# Patient Record
Sex: MALE | Race: WHITE | NOT HISPANIC OR LATINO | Employment: OTHER
[De-identification: names, ages, dates, MRNs, and addresses within clinical notes are randomized per-mention and may not be internally consistent; named-entity substitution may affect disease eponyms.]

---

## 2019-01-04 ENCOUNTER — RECORDS - HEALTHEAST (OUTPATIENT)
Dept: ADMINISTRATIVE | Facility: OTHER | Age: 84
End: 2019-01-04

## 2019-01-05 ENCOUNTER — HOSPITAL ENCOUNTER (OUTPATIENT)
Dept: ULTRASOUND IMAGING | Facility: HOSPITAL | Age: 84
Discharge: HOME | End: 2019-01-05
Attending: INTERNAL MEDICINE

## 2019-01-05 DIAGNOSIS — D75.1 POLYCYTHEMIA: ICD-10-CM

## 2019-08-13 ENCOUNTER — AMBULATORY - HEALTHEAST (OUTPATIENT)
Dept: CARDIOLOGY | Facility: CLINIC | Age: 84
End: 2019-08-13

## 2019-08-13 ENCOUNTER — HOME CARE/HOSPICE - HEALTHEAST (OUTPATIENT)
Dept: HOME HEALTH SERVICES | Facility: HOME HEALTH | Age: 84
End: 2019-08-13

## 2019-08-19 ENCOUNTER — OFFICE VISIT - HEALTHEAST (OUTPATIENT)
Dept: GERIATRICS | Facility: CLINIC | Age: 84
End: 2019-08-19

## 2019-08-19 DIAGNOSIS — I48.0 PAROXYSMAL ATRIAL FIBRILLATION (H): ICD-10-CM

## 2019-08-19 DIAGNOSIS — Z95.810 BIVENTRICULAR ICD (IMPLANTABLE CARDIOVERTER-DEFIBRILLATOR) IN PLACE: ICD-10-CM

## 2019-08-19 DIAGNOSIS — J44.9 CHRONIC OBSTRUCTIVE PULMONARY DISEASE, UNSPECIFIED COPD TYPE (H): ICD-10-CM

## 2019-08-19 DIAGNOSIS — I50.9 CONGESTIVE HEART FAILURE, UNSPECIFIED HF CHRONICITY, UNSPECIFIED HEART FAILURE TYPE (H): ICD-10-CM

## 2019-08-19 DIAGNOSIS — R55 PRE-SYNCOPE: ICD-10-CM

## 2019-08-26 ENCOUNTER — OFFICE VISIT - HEALTHEAST (OUTPATIENT)
Dept: GERIATRICS | Facility: CLINIC | Age: 84
End: 2019-08-26

## 2019-08-26 DIAGNOSIS — I48.0 PAROXYSMAL ATRIAL FIBRILLATION (H): ICD-10-CM

## 2019-08-26 DIAGNOSIS — R42 DIZZINESS: ICD-10-CM

## 2019-08-26 DIAGNOSIS — I50.9 CONGESTIVE HEART FAILURE, UNSPECIFIED HF CHRONICITY, UNSPECIFIED HEART FAILURE TYPE (H): ICD-10-CM

## 2019-08-26 DIAGNOSIS — J44.9 CHRONIC OBSTRUCTIVE PULMONARY DISEASE, UNSPECIFIED COPD TYPE (H): ICD-10-CM

## 2019-08-28 ENCOUNTER — COMMUNICATION - HEALTHEAST (OUTPATIENT)
Dept: GERIATRICS | Facility: CLINIC | Age: 84
End: 2019-08-28

## 2019-08-29 ENCOUNTER — OFFICE VISIT - HEALTHEAST (OUTPATIENT)
Dept: GERIATRICS | Facility: CLINIC | Age: 84
End: 2019-08-29

## 2019-08-29 DIAGNOSIS — I50.9 CONGESTIVE HEART FAILURE, UNSPECIFIED HF CHRONICITY, UNSPECIFIED HEART FAILURE TYPE (H): ICD-10-CM

## 2019-08-29 DIAGNOSIS — J44.9 CHRONIC OBSTRUCTIVE PULMONARY DISEASE, UNSPECIFIED COPD TYPE (H): ICD-10-CM

## 2019-08-29 DIAGNOSIS — J96.11 CHRONIC RESPIRATORY FAILURE WITH HYPOXIA (H): ICD-10-CM

## 2019-08-29 DIAGNOSIS — I44.2 CHB (COMPLETE HEART BLOCK) (H): ICD-10-CM

## 2019-08-29 DIAGNOSIS — Z95.810 BIVENTRICULAR ICD (IMPLANTABLE CARDIOVERTER-DEFIBRILLATOR) IN PLACE: ICD-10-CM

## 2019-08-30 ENCOUNTER — RECORDS - HEALTHEAST (OUTPATIENT)
Dept: LAB | Facility: CLINIC | Age: 84
End: 2019-08-30

## 2019-09-02 ENCOUNTER — OFFICE VISIT - HEALTHEAST (OUTPATIENT)
Dept: GERIATRICS | Facility: CLINIC | Age: 84
End: 2019-09-02
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
## 2019-09-03 ENCOUNTER — OFFICE VISIT - HEALTHEAST (OUTPATIENT)
Dept: GERIATRICS | Facility: CLINIC | Age: 84
End: 2019-09-03

## 2019-09-03 ENCOUNTER — COMMUNICATION - HEALTHEAST (OUTPATIENT)
Dept: GERIATRICS | Facility: CLINIC | Age: 84
End: 2019-09-03

## 2019-09-03 DIAGNOSIS — J44.9 CHRONIC OBSTRUCTIVE PULMONARY DISEASE, UNSPECIFIED COPD TYPE (H): ICD-10-CM

## 2019-09-03 DIAGNOSIS — R55 PRE-SYNCOPE: ICD-10-CM

## 2019-09-03 DIAGNOSIS — I42.8 NICM (NONISCHEMIC CARDIOMYOPATHY) (H): ICD-10-CM

## 2019-09-03 LAB
ANION GAP SERPL CALCULATED.3IONS-SCNC: 7 MMOL/L (ref 5–18)
BUN SERPL-MCNC: 27 MG/DL (ref 8–28)
CALCIUM SERPL-MCNC: 9 MG/DL (ref 8.5–10.5)
CHLORIDE BLD-SCNC: 98 MMOL/L (ref 98–107)
CO2 SERPL-SCNC: 32 MMOL/L (ref 22–31)
CREAT SERPL-MCNC: 0.9 MG/DL (ref 0.7–1.3)
GFR SERPL CREATININE-BSD FRML MDRD: >60 ML/MIN/1.73M2
GLUCOSE BLD-MCNC: 74 MG/DL (ref 70–125)
POTASSIUM BLD-SCNC: 4.5 MMOL/L (ref 3.5–5)
SODIUM SERPL-SCNC: 137 MMOL/L (ref 136–145)

## 2019-09-05 ENCOUNTER — OFFICE VISIT - HEALTHEAST (OUTPATIENT)
Dept: GERIATRICS | Facility: CLINIC | Age: 84
End: 2019-09-05

## 2019-09-05 DIAGNOSIS — J96.10 CHRONIC RESPIRATORY FAILURE, UNSPECIFIED WHETHER WITH HYPOXIA OR HYPERCAPNIA (H): ICD-10-CM

## 2019-09-05 DIAGNOSIS — I50.9 CONGESTIVE HEART FAILURE, UNSPECIFIED HF CHRONICITY, UNSPECIFIED HEART FAILURE TYPE (H): ICD-10-CM

## 2019-09-05 DIAGNOSIS — Z95.810 BIVENTRICULAR ICD (IMPLANTABLE CARDIOVERTER-DEFIBRILLATOR) IN PLACE: ICD-10-CM

## 2019-09-05 DIAGNOSIS — I48.0 PAROXYSMAL ATRIAL FIBRILLATION (H): ICD-10-CM

## 2019-09-05 DIAGNOSIS — J44.9 CHRONIC OBSTRUCTIVE PULMONARY DISEASE, UNSPECIFIED COPD TYPE (H): ICD-10-CM

## 2019-09-06 ENCOUNTER — AMBULATORY - HEALTHEAST (OUTPATIENT)
Dept: GERIATRICS | Facility: CLINIC | Age: 84
End: 2019-09-06

## 2020-02-13 ENCOUNTER — HOSPITAL ENCOUNTER (OUTPATIENT)
Dept: ULTRASOUND IMAGING | Facility: HOSPITAL | Age: 85
Discharge: HOME | End: 2020-02-13
Attending: INTERNAL MEDICINE

## 2020-02-13 ENCOUNTER — RECORDS - HEALTHEAST (OUTPATIENT)
Dept: ADMINISTRATIVE | Facility: OTHER | Age: 85
End: 2020-02-13

## 2020-02-13 DIAGNOSIS — D75.1 ERYTHROCYTOSIS: ICD-10-CM

## 2020-02-13 DIAGNOSIS — M79.89 LEFT ARM SWELLING: ICD-10-CM

## 2021-01-01 ENCOUNTER — APPOINTMENT (OUTPATIENT)
Dept: SPEECH THERAPY | Facility: HOSPITAL | Age: 86
DRG: 193 | End: 2021-01-01
Payer: COMMERCIAL

## 2021-01-01 ENCOUNTER — ANESTHESIA (OUTPATIENT)
Dept: SURGERY | Facility: HOSPITAL | Age: 86
DRG: 193 | End: 2021-01-01
Payer: COMMERCIAL

## 2021-01-01 ENCOUNTER — APPOINTMENT (OUTPATIENT)
Dept: RADIOLOGY | Facility: HOSPITAL | Age: 86
DRG: 193 | End: 2021-01-01
Payer: COMMERCIAL

## 2021-01-01 ENCOUNTER — APPOINTMENT (OUTPATIENT)
Dept: SPEECH THERAPY | Facility: HOSPITAL | Age: 86
DRG: 193 | End: 2021-01-01
Attending: INTERNAL MEDICINE
Payer: COMMERCIAL

## 2021-01-01 ENCOUNTER — APPOINTMENT (OUTPATIENT)
Dept: CARDIOLOGY | Facility: HOSPITAL | Age: 86
DRG: 193 | End: 2021-01-01
Attending: INTERNAL MEDICINE
Payer: COMMERCIAL

## 2021-01-01 ENCOUNTER — NURSE TRIAGE (OUTPATIENT)
Dept: NURSING | Facility: CLINIC | Age: 86
End: 2021-01-01

## 2021-01-01 ENCOUNTER — APPOINTMENT (OUTPATIENT)
Dept: OCCUPATIONAL THERAPY | Facility: HOSPITAL | Age: 86
DRG: 193 | End: 2021-01-01
Payer: COMMERCIAL

## 2021-01-01 ENCOUNTER — APPOINTMENT (OUTPATIENT)
Dept: PHYSICAL THERAPY | Facility: HOSPITAL | Age: 86
DRG: 193 | End: 2021-01-01
Payer: COMMERCIAL

## 2021-01-01 ENCOUNTER — APPOINTMENT (OUTPATIENT)
Dept: RADIOLOGY | Facility: HOSPITAL | Age: 86
DRG: 193 | End: 2021-01-01
Attending: INTERNAL MEDICINE
Payer: COMMERCIAL

## 2021-01-01 ENCOUNTER — COMMUNICATION - HEALTHEAST (OUTPATIENT)
Dept: SCHEDULING | Facility: CLINIC | Age: 86
End: 2021-01-01

## 2021-01-01 ENCOUNTER — APPOINTMENT (OUTPATIENT)
Dept: CT IMAGING | Facility: HOSPITAL | Age: 86
DRG: 193 | End: 2021-01-01
Attending: INTERNAL MEDICINE
Payer: COMMERCIAL

## 2021-01-01 ENCOUNTER — APPOINTMENT (OUTPATIENT)
Dept: RADIOLOGY | Facility: HOSPITAL | Age: 86
DRG: 193 | End: 2021-01-01
Attending: EMERGENCY MEDICINE
Payer: COMMERCIAL

## 2021-01-01 ENCOUNTER — RECORDS - HEALTHEAST (OUTPATIENT)
Dept: ADMINISTRATIVE | Facility: CLINIC | Age: 86
End: 2021-01-01

## 2021-01-01 ENCOUNTER — ANESTHESIA EVENT (OUTPATIENT)
Dept: SURGERY | Facility: HOSPITAL | Age: 86
DRG: 193 | End: 2021-01-01
Payer: COMMERCIAL

## 2021-01-01 ENCOUNTER — HOSPITAL ENCOUNTER (INPATIENT)
Facility: HOSPITAL | Age: 86
LOS: 10 days | Discharge: NURSING, REHAB OR LONG-TERM CARE | DRG: 193 | End: 2021-11-10
Attending: EMERGENCY MEDICINE | Admitting: INTERNAL MEDICINE
Payer: COMMERCIAL

## 2021-01-01 ENCOUNTER — APPOINTMENT (OUTPATIENT)
Dept: PHYSICAL THERAPY | Facility: HOSPITAL | Age: 86
DRG: 193 | End: 2021-01-01
Attending: INTERNAL MEDICINE
Payer: COMMERCIAL

## 2021-01-01 ENCOUNTER — APPOINTMENT (OUTPATIENT)
Dept: RADIOLOGY | Facility: HOSPITAL | Age: 86
DRG: 193 | End: 2021-01-01
Attending: FAMILY MEDICINE
Payer: COMMERCIAL

## 2021-01-01 ENCOUNTER — APPOINTMENT (OUTPATIENT)
Dept: OCCUPATIONAL THERAPY | Facility: HOSPITAL | Age: 86
DRG: 193 | End: 2021-01-01
Attending: INTERNAL MEDICINE
Payer: COMMERCIAL

## 2021-01-01 ENCOUNTER — APPOINTMENT (OUTPATIENT)
Dept: CT IMAGING | Facility: HOSPITAL | Age: 86
DRG: 193 | End: 2021-01-01
Attending: EMERGENCY MEDICINE
Payer: COMMERCIAL

## 2021-01-01 VITALS
RESPIRATION RATE: 17 BRPM | HEART RATE: 66 BPM | TEMPERATURE: 97.9 F | SYSTOLIC BLOOD PRESSURE: 140 MMHG | DIASTOLIC BLOOD PRESSURE: 78 MMHG | OXYGEN SATURATION: 94 %

## 2021-01-01 VITALS
TEMPERATURE: 97.8 F | OXYGEN SATURATION: 94 % | DIASTOLIC BLOOD PRESSURE: 70 MMHG | SYSTOLIC BLOOD PRESSURE: 149 MMHG | HEART RATE: 67 BPM | WEIGHT: 223.4 LBS | BODY MASS INDEX: 30.3 KG/M2 | RESPIRATION RATE: 16 BRPM

## 2021-01-01 VITALS
DIASTOLIC BLOOD PRESSURE: 52 MMHG | TEMPERATURE: 98.3 F | OXYGEN SATURATION: 95 % | RESPIRATION RATE: 24 BRPM | BODY MASS INDEX: 26.51 KG/M2 | SYSTOLIC BLOOD PRESSURE: 118 MMHG | WEIGHT: 206.6 LBS | HEIGHT: 74 IN | HEART RATE: 70 BPM

## 2021-01-01 VITALS — WEIGHT: 227.1 LBS | BODY MASS INDEX: 30.8 KG/M2

## 2021-01-01 VITALS — BODY MASS INDEX: 30.24 KG/M2 | WEIGHT: 223 LBS

## 2021-01-01 VITALS — WEIGHT: 226.6 LBS | BODY MASS INDEX: 30.73 KG/M2

## 2021-01-01 DIAGNOSIS — D64.9 ANEMIA, UNSPECIFIED TYPE: ICD-10-CM

## 2021-01-01 DIAGNOSIS — R07.89 CHEST DISCOMFORT: Primary | ICD-10-CM

## 2021-01-01 DIAGNOSIS — R13.10 DYSPHAGIA, UNSPECIFIED TYPE: ICD-10-CM

## 2021-01-01 DIAGNOSIS — J44.9 COPD, GROUP B, BY GOLD 2017 CLASSIFICATION (H): ICD-10-CM

## 2021-01-01 DIAGNOSIS — J18.9 PNEUMONIA OF BOTH LUNGS DUE TO INFECTIOUS ORGANISM, UNSPECIFIED PART OF LUNG: ICD-10-CM

## 2021-01-01 DIAGNOSIS — K62.89 PROCTITIS: ICD-10-CM

## 2021-01-01 DIAGNOSIS — J96.21 ACUTE ON CHRONIC RESPIRATORY FAILURE WITH HYPOXIA (H): ICD-10-CM

## 2021-01-01 LAB
ALBUMIN SERPL-MCNC: 3.2 G/DL (ref 3.5–5)
ALBUMIN SERPL-MCNC: 3.4 G/DL (ref 3.5–5)
ALBUMIN SERPL-MCNC: 3.9 G/DL (ref 3.5–5)
ALBUMIN UR-MCNC: 30 MG/DL
ALP SERPL-CCNC: 122 U/L (ref 45–120)
ALP SERPL-CCNC: 142 U/L (ref 45–120)
ALP SERPL-CCNC: 146 U/L (ref 45–120)
ALT SERPL W P-5'-P-CCNC: <9 U/L (ref 0–45)
ANION GAP SERPL CALCULATED.3IONS-SCNC: 10 MMOL/L (ref 5–18)
ANION GAP SERPL CALCULATED.3IONS-SCNC: 11 MMOL/L (ref 5–18)
ANION GAP SERPL CALCULATED.3IONS-SCNC: 6 MMOL/L (ref 5–18)
ANION GAP SERPL CALCULATED.3IONS-SCNC: 6 MMOL/L (ref 5–18)
ANION GAP SERPL CALCULATED.3IONS-SCNC: 7 MMOL/L (ref 5–18)
ANION GAP SERPL CALCULATED.3IONS-SCNC: 7 MMOL/L (ref 5–18)
ANION GAP SERPL CALCULATED.3IONS-SCNC: 8 MMOL/L (ref 5–18)
ANION GAP SERPL CALCULATED.3IONS-SCNC: 9 MMOL/L (ref 5–18)
APPEARANCE FLD: ABNORMAL
APPEARANCE UR: ABNORMAL
AST SERPL W P-5'-P-CCNC: 19 U/L (ref 0–40)
AST SERPL W P-5'-P-CCNC: 21 U/L (ref 0–40)
AST SERPL W P-5'-P-CCNC: 21 U/L (ref 0–40)
ATRIAL RATE - MUSE: 72 BPM
BACTERIA #/AREA URNS HPF: ABNORMAL /HPF
BACTERIA BLD CULT: NO GROWTH
BACTERIA BLD CULT: NO GROWTH
BACTERIA PLR CULT: NO GROWTH
BACTERIA PLR CULT: NORMAL
BACTERIA UR CULT: NO GROWTH
BASE EXCESS BLDA CALC-SCNC: -1.2 MMOL/L
BASE EXCESS BLDA CALC-SCNC: 0.9 MMOL/L
BASE EXCESS BLDA CALC-SCNC: 1.3 MMOL/L
BASO STIPL BLD QL SMEAR: PRESENT
BASOPHILS # BLD MANUAL: 0 10E3/UL (ref 0–0.2)
BASOPHILS # BLD MANUAL: 0 10E3/UL (ref 0–0.2)
BASOPHILS NFR BLD MANUAL: 0 %
BASOPHILS NFR BLD MANUAL: 0 %
BILIRUB DIRECT SERPL-MCNC: 0.2 MG/DL
BILIRUB SERPL-MCNC: 0.5 MG/DL (ref 0–1)
BILIRUB SERPL-MCNC: 0.5 MG/DL (ref 0–1)
BILIRUB SERPL-MCNC: 0.6 MG/DL (ref 0–1)
BILIRUB UR QL STRIP: NEGATIVE
BNP SERPL-MCNC: 520 PG/ML (ref 0–93)
BNP SERPL-MCNC: 881 PG/ML (ref 0–93)
BUN SERPL-MCNC: 24 MG/DL (ref 8–28)
BUN SERPL-MCNC: 28 MG/DL (ref 8–28)
BUN SERPL-MCNC: 28 MG/DL (ref 8–28)
BUN SERPL-MCNC: 31 MG/DL (ref 8–28)
BUN SERPL-MCNC: 34 MG/DL (ref 8–28)
BUN SERPL-MCNC: 36 MG/DL (ref 8–28)
BUN SERPL-MCNC: 43 MG/DL (ref 8–28)
BUN SERPL-MCNC: 51 MG/DL (ref 8–28)
BUN SERPL-MCNC: 59 MG/DL (ref 8–28)
BUN SERPL-MCNC: 61 MG/DL (ref 8–28)
BUN SERPL-MCNC: 62 MG/DL (ref 8–28)
BUN SERPL-MCNC: 66 MG/DL (ref 8–28)
BUN SERPL-MCNC: 72 MG/DL (ref 8–28)
C PNEUM DNA SPEC QL NAA+PROBE: NOT DETECTED
CALCIUM SERPL-MCNC: 10 MG/DL (ref 8.5–10.5)
CALCIUM SERPL-MCNC: 8.9 MG/DL (ref 8.5–10.5)
CALCIUM SERPL-MCNC: 9.1 MG/DL (ref 8.5–10.5)
CALCIUM SERPL-MCNC: 9.2 MG/DL (ref 8.5–10.5)
CALCIUM SERPL-MCNC: 9.3 MG/DL (ref 8.5–10.5)
CALCIUM SERPL-MCNC: 9.3 MG/DL (ref 8.5–10.5)
CALCIUM SERPL-MCNC: 9.4 MG/DL (ref 8.5–10.5)
CALCIUM SERPL-MCNC: 9.5 MG/DL (ref 8.5–10.5)
CALCIUM SERPL-MCNC: 9.7 MG/DL (ref 8.5–10.5)
CALCIUM SERPL-MCNC: 9.7 MG/DL (ref 8.5–10.5)
CALCIUM SERPL-MCNC: 9.8 MG/DL (ref 8.5–10.5)
CHLORIDE BLD-SCNC: 103 MMOL/L (ref 98–107)
CHLORIDE BLD-SCNC: 106 MMOL/L (ref 98–107)
CHLORIDE BLD-SCNC: 107 MMOL/L (ref 98–107)
CHLORIDE BLD-SCNC: 109 MMOL/L (ref 98–107)
CO2 SERPL-SCNC: 25 MMOL/L (ref 22–31)
CO2 SERPL-SCNC: 25 MMOL/L (ref 22–31)
CO2 SERPL-SCNC: 26 MMOL/L (ref 22–31)
CO2 SERPL-SCNC: 27 MMOL/L (ref 22–31)
CO2 SERPL-SCNC: 28 MMOL/L (ref 22–31)
CO2 SERPL-SCNC: 29 MMOL/L (ref 22–31)
CO2 SERPL-SCNC: 30 MMOL/L (ref 22–31)
COHGB MFR BLD: 91.3 % (ref 95–96)
COHGB MFR BLD: 91.3 % (ref 95–96)
COHGB MFR BLD: 92.4 % (ref 95–96)
COLOR FLD: ABNORMAL
COLOR UR AUTO: YELLOW
CREAT SERPL-MCNC: 1.07 MG/DL (ref 0.7–1.3)
CREAT SERPL-MCNC: 1.28 MG/DL (ref 0.7–1.3)
CREAT SERPL-MCNC: 1.4 MG/DL (ref 0.7–1.3)
CREAT SERPL-MCNC: 1.51 MG/DL (ref 0.7–1.3)
CREAT SERPL-MCNC: 1.6 MG/DL (ref 0.7–1.3)
CREAT SERPL-MCNC: 1.61 MG/DL (ref 0.7–1.3)
CREAT SERPL-MCNC: 1.85 MG/DL (ref 0.7–1.3)
CREAT SERPL-MCNC: 1.96 MG/DL (ref 0.7–1.3)
CREAT SERPL-MCNC: 2.22 MG/DL (ref 0.7–1.3)
CREAT SERPL-MCNC: 2.34 MG/DL (ref 0.7–1.3)
CREAT SERPL-MCNC: 2.43 MG/DL (ref 0.7–1.3)
CREAT SERPL-MCNC: 2.75 MG/DL (ref 0.7–1.3)
CREAT SERPL-MCNC: 3.01 MG/DL (ref 0.7–1.3)
DIASTOLIC BLOOD PRESSURE - MUSE: 55 MMHG
EOSINOPHIL # BLD MANUAL: 0 10E3/UL (ref 0–0.7)
EOSINOPHIL # BLD MANUAL: 0 10E3/UL (ref 0–0.7)
EOSINOPHIL NFR BLD MANUAL: 0 %
EOSINOPHIL NFR BLD MANUAL: 0 %
ERYTHROCYTE [DISTWIDTH] IN BLOOD BY AUTOMATED COUNT: 17 % (ref 10–15)
ERYTHROCYTE [DISTWIDTH] IN BLOOD BY AUTOMATED COUNT: 17.2 % (ref 10–15)
ERYTHROCYTE [DISTWIDTH] IN BLOOD BY AUTOMATED COUNT: 17.3 % (ref 10–15)
ERYTHROCYTE [DISTWIDTH] IN BLOOD BY AUTOMATED COUNT: 17.3 % (ref 10–15)
ERYTHROCYTE [DISTWIDTH] IN BLOOD BY AUTOMATED COUNT: 17.4 % (ref 10–15)
FERRITIN SERPL-MCNC: 1466 NG/ML (ref 27–300)
FLEXIBLE SIGMOIDOSCOPY: NORMAL
FLOW: 15 LPM
FLOW: 7 LPM
FLUAV H1 2009 PAND RNA SPEC QL NAA+PROBE: NOT DETECTED
FLUAV H1 RNA SPEC QL NAA+PROBE: NOT DETECTED
FLUAV H3 RNA SPEC QL NAA+PROBE: NOT DETECTED
FLUAV RNA SPEC QL NAA+PROBE: NOT DETECTED
FLUBV RNA SPEC QL NAA+PROBE: NOT DETECTED
FOLATE SERPL-MCNC: 8.8 NG/ML
GFR SERPL CREATININE-BSD FRML MDRD: 17 ML/MIN/1.73M2
GFR SERPL CREATININE-BSD FRML MDRD: 19 ML/MIN/1.73M2
GFR SERPL CREATININE-BSD FRML MDRD: 22 ML/MIN/1.73M2
GFR SERPL CREATININE-BSD FRML MDRD: 23 ML/MIN/1.73M2
GFR SERPL CREATININE-BSD FRML MDRD: 25 ML/MIN/1.73M2
GFR SERPL CREATININE-BSD FRML MDRD: 29 ML/MIN/1.73M2
GFR SERPL CREATININE-BSD FRML MDRD: 31 ML/MIN/1.73M2
GFR SERPL CREATININE-BSD FRML MDRD: 36 ML/MIN/1.73M2
GFR SERPL CREATININE-BSD FRML MDRD: 37 ML/MIN/1.73M2
GFR SERPL CREATININE-BSD FRML MDRD: 39 ML/MIN/1.73M2
GFR SERPL CREATININE-BSD FRML MDRD: 43 ML/MIN/1.73M2
GFR SERPL CREATININE-BSD FRML MDRD: 48 ML/MIN/1.73M2
GFR SERPL CREATININE-BSD FRML MDRD: 60 ML/MIN/1.73M2
GLUCOSE BLD-MCNC: 113 MG/DL (ref 70–125)
GLUCOSE BLD-MCNC: 114 MG/DL (ref 70–125)
GLUCOSE BLD-MCNC: 134 MG/DL (ref 70–125)
GLUCOSE BLD-MCNC: 144 MG/DL (ref 70–125)
GLUCOSE BLD-MCNC: 152 MG/DL (ref 70–125)
GLUCOSE BLD-MCNC: 81 MG/DL (ref 70–125)
GLUCOSE BLD-MCNC: 87 MG/DL (ref 70–125)
GLUCOSE BLD-MCNC: 90 MG/DL (ref 70–125)
GLUCOSE BLD-MCNC: 92 MG/DL (ref 70–125)
GLUCOSE BLD-MCNC: 94 MG/DL (ref 70–125)
GLUCOSE BLD-MCNC: 95 MG/DL (ref 70–125)
GLUCOSE BLD-MCNC: 96 MG/DL (ref 70–125)
GLUCOSE BLD-MCNC: 98 MG/DL (ref 70–125)
GLUCOSE BLDC GLUCOMTR-MCNC: 86 MG/DL (ref 70–99)
GLUCOSE BLDC GLUCOMTR-MCNC: 96 MG/DL (ref 70–99)
GLUCOSE FLD-MCNC: 110 MG/DL
GLUCOSE UR STRIP-MCNC: NEGATIVE MG/DL
GRAM STAIN RESULT: NORMAL
HADV DNA SPEC QL NAA+PROBE: NOT DETECTED
HCO3 BLD-SCNC: 23 MMOL/L (ref 23–29)
HCO3 BLD-SCNC: 25 MMOL/L (ref 23–29)
HCO3 BLD-SCNC: 25 MMOL/L (ref 23–29)
HCOV PNL SPEC NAA+PROBE: NOT DETECTED
HCT VFR BLD AUTO: 25 % (ref 40–53)
HCT VFR BLD AUTO: 26.2 % (ref 40–53)
HCT VFR BLD AUTO: 26.6 % (ref 40–53)
HCT VFR BLD AUTO: 26.7 % (ref 40–53)
HCT VFR BLD AUTO: 27.4 % (ref 40–53)
HCT VFR BLD AUTO: 27.4 % (ref 40–53)
HCT VFR BLD AUTO: 27.9 % (ref 40–53)
HCT VFR BLD AUTO: 28.2 % (ref 40–53)
HCT VFR BLD AUTO: 28.2 % (ref 40–53)
HCT VFR BLD AUTO: 28.5 % (ref 40–53)
HCT VFR BLD AUTO: 29.2 % (ref 40–53)
HCT VFR BLD AUTO: 29.5 % (ref 40–53)
HCT VFR BLD AUTO: 30.2 % (ref 40–53)
HGB BLD-MCNC: 7.3 G/DL (ref 13.3–17.7)
HGB BLD-MCNC: 7.6 G/DL (ref 13.3–17.7)
HGB BLD-MCNC: 7.8 G/DL (ref 13.3–17.7)
HGB BLD-MCNC: 7.8 G/DL (ref 13.3–17.7)
HGB BLD-MCNC: 7.9 G/DL (ref 13.3–17.7)
HGB BLD-MCNC: 8 G/DL (ref 13.3–17.7)
HGB BLD-MCNC: 8.2 G/DL (ref 13.3–17.7)
HGB BLD-MCNC: 8.2 G/DL (ref 13.3–17.7)
HGB BLD-MCNC: 8.4 G/DL (ref 13.3–17.7)
HGB BLD-MCNC: 8.4 G/DL (ref 13.3–17.7)
HGB BLD-MCNC: 8.5 G/DL (ref 13.3–17.7)
HGB BLD-MCNC: 8.7 G/DL (ref 13.3–17.7)
HGB BLD-MCNC: 8.8 G/DL (ref 13.3–17.7)
HGB UR QL STRIP: NEGATIVE
HMPV RNA SPEC QL NAA+PROBE: NOT DETECTED
HOLD SPECIMEN: NORMAL
HPIV1 RNA SPEC QL NAA+PROBE: NOT DETECTED
HPIV2 RNA SPEC QL NAA+PROBE: NOT DETECTED
HPIV3 RNA SPEC QL NAA+PROBE: NOT DETECTED
HPIV4 RNA SPEC QL NAA+PROBE: NOT DETECTED
HYALINE CASTS: 6 /LPF
INTERPRETATION ECG - MUSE: NORMAL
IRON SATN MFR SERPL: 9 % (ref 20–50)
IRON SERPL-MCNC: 15 UG/DL (ref 42–175)
KETONES UR STRIP-MCNC: NEGATIVE MG/DL
LACTATE SERPL-SCNC: 0.7 MMOL/L (ref 0.7–2)
LACTATE SERPL-SCNC: 1.3 MMOL/L (ref 0.7–2)
LACTATE SERPL-SCNC: 1.4 MMOL/L (ref 0.7–2)
LACTATE SERPL-SCNC: 1.7 MMOL/L (ref 0.7–2)
LACTATE SERPL-SCNC: 1.8 MMOL/L (ref 0.7–2)
LACTATE SERPL-SCNC: 2.5 MMOL/L (ref 0.7–2)
LDH FLD L TO P-CCNC: 181 U/L
LDH SERPL L TO P-CCNC: 542 U/L (ref 125–220)
LEUKOCYTE ESTERASE UR QL STRIP: NEGATIVE
LVEF ECHO: NORMAL
LYMPHOCYTES # BLD MANUAL: 0 10E3/UL (ref 0.8–5.3)
LYMPHOCYTES # BLD MANUAL: 0.5 10E3/UL (ref 0.8–5.3)
LYMPHOCYTES NFR BLD MANUAL: 0 %
LYMPHOCYTES NFR BLD MANUAL: 1 %
LYMPHOCYTES NFR FLD MANUAL: 8 %
M PNEUMO DNA SPEC QL NAA+PROBE: NOT DETECTED
MCH RBC QN AUTO: 32.8 PG (ref 26.5–33)
MCH RBC QN AUTO: 33 PG (ref 26.5–33)
MCH RBC QN AUTO: 33.1 PG (ref 26.5–33)
MCH RBC QN AUTO: 33.2 PG (ref 26.5–33)
MCH RBC QN AUTO: 33.3 PG (ref 26.5–33)
MCH RBC QN AUTO: 33.5 PG (ref 26.5–33)
MCH RBC QN AUTO: 33.6 PG (ref 26.5–33)
MCH RBC QN AUTO: 33.6 PG (ref 26.5–33)
MCH RBC QN AUTO: 34.2 PG (ref 26.5–33)
MCHC RBC AUTO-ENTMCNC: 28.5 G/DL (ref 31.5–36.5)
MCHC RBC AUTO-ENTMCNC: 28.8 G/DL (ref 31.5–36.5)
MCHC RBC AUTO-ENTMCNC: 28.8 G/DL (ref 31.5–36.5)
MCHC RBC AUTO-ENTMCNC: 29 G/DL (ref 31.5–36.5)
MCHC RBC AUTO-ENTMCNC: 29.1 G/DL (ref 31.5–36.5)
MCHC RBC AUTO-ENTMCNC: 29.1 G/DL (ref 31.5–36.5)
MCHC RBC AUTO-ENTMCNC: 29.2 G/DL (ref 31.5–36.5)
MCHC RBC AUTO-ENTMCNC: 29.2 G/DL (ref 31.5–36.5)
MCHC RBC AUTO-ENTMCNC: 29.4 G/DL (ref 31.5–36.5)
MCHC RBC AUTO-ENTMCNC: 29.5 G/DL (ref 31.5–36.5)
MCHC RBC AUTO-ENTMCNC: 29.8 G/DL (ref 31.5–36.5)
MCHC RBC AUTO-ENTMCNC: 29.8 G/DL (ref 31.5–36.5)
MCHC RBC AUTO-ENTMCNC: 30.1 G/DL (ref 31.5–36.5)
MCV RBC AUTO: 110 FL (ref 78–100)
MCV RBC AUTO: 111 FL (ref 78–100)
MCV RBC AUTO: 112 FL (ref 78–100)
MCV RBC AUTO: 114 FL (ref 78–100)
MCV RBC AUTO: 115 FL (ref 78–100)
MCV RBC AUTO: 116 FL (ref 78–100)
MCV RBC AUTO: 117 FL (ref 78–100)
MCV RBC AUTO: 117 FL (ref 78–100)
MONOCYTES # BLD MANUAL: 1 10E3/UL (ref 0–1.3)
MONOCYTES # BLD MANUAL: 1.1 10E3/UL (ref 0–1.3)
MONOCYTES NFR BLD MANUAL: 2 %
MONOCYTES NFR BLD MANUAL: 2 %
MONOS+MACROS NFR FLD MANUAL: 80 %
NEUTROPHILS # BLD MANUAL: 46.2 10E3/UL (ref 1.6–8.3)
NEUTROPHILS # BLD MANUAL: 55.4 10E3/UL (ref 1.6–8.3)
NEUTROPHILS NFR BLD MANUAL: 97 %
NEUTROPHILS NFR BLD MANUAL: 98 %
NEUTS BAND NFR FLD MANUAL: 12 %
NITRATE UR QL: NEGATIVE
O2/TOTAL GAS SETTING VFR VENT: 50 %
OSMOLALITY UR: 402 MOSM/KG (ref 300–900)
OXYHGB MFR BLD: 88.8 % (ref 95–96)
OXYHGB MFR BLD: 89.7 % (ref 95–96)
OXYHGB MFR BLD: 90.5 % (ref 95–96)
P AXIS - MUSE: 68 DEGREES
PATH REPORT.COMMENTS IMP SPEC: NORMAL
PATH REPORT.FINAL DX SPEC: NORMAL
PATH REPORT.GROSS SPEC: NORMAL
PATH REPORT.GROSS SPEC: NORMAL
PATH REPORT.MICROSCOPIC SPEC OTHER STN: NORMAL
PATH REPORT.RELEVANT HX SPEC: NORMAL
PCO2 BLD: 55 MM HG (ref 35–45)
PCO2 BLD: 57 MM HG (ref 35–45)
PCO2 BLD: 66 MM HG (ref 35–45)
PEEP: 8 CM H2O
PH BLD: 7.24 [PH] (ref 7.37–7.44)
PH BLD: 7.28 [PH] (ref 7.37–7.44)
PH BLD: 7.3 [PH] (ref 7.37–7.44)
PH FLD: 8 PH
PH UR STRIP: 5 [PH] (ref 5–7)
PHOSPHATE SERPL-MCNC: 3.2 MG/DL (ref 2.5–4.5)
PHOSPHATE SERPL-MCNC: 3.3 MG/DL (ref 2.5–4.5)
PHOTO IMAGE: NORMAL
PLAT MORPH BLD: ABNORMAL
PLATELET # BLD AUTO: 353 10E3/UL (ref 150–450)
PLATELET # BLD AUTO: 355 10E3/UL (ref 150–450)
PLATELET # BLD AUTO: 432 10E3/UL (ref 150–450)
PLATELET # BLD AUTO: 533 10E3/UL (ref 150–450)
PLATELET # BLD AUTO: 545 10E3/UL (ref 150–450)
PLATELET # BLD AUTO: 548 10E3/UL (ref 150–450)
PLATELET # BLD AUTO: 549 10E3/UL (ref 150–450)
PLATELET # BLD AUTO: 550 10E3/UL (ref 150–450)
PLATELET # BLD AUTO: 573 10E3/UL (ref 150–450)
PLATELET # BLD AUTO: 672 10E3/UL (ref 150–450)
PLATELET # BLD AUTO: 675 10E3/UL (ref 150–450)
PLATELET # BLD AUTO: 781 10E3/UL (ref 150–450)
PLATELET # BLD AUTO: 820 10E3/UL (ref 150–450)
PO2 BLD: 58 MM HG (ref 75–85)
PO2 BLD: 59 MM HG (ref 75–85)
PO2 BLD: 65 MM HG (ref 75–85)
POLYCHROMASIA BLD QL SMEAR: SLIGHT
POTASSIUM BLD-SCNC: 4.2 MMOL/L (ref 3.5–5)
POTASSIUM BLD-SCNC: 4.2 MMOL/L (ref 3.5–5)
POTASSIUM BLD-SCNC: 4.3 MMOL/L (ref 3.5–5)
POTASSIUM BLD-SCNC: 4.5 MMOL/L (ref 3.5–5)
POTASSIUM BLD-SCNC: 4.6 MMOL/L (ref 3.5–5)
POTASSIUM BLD-SCNC: 4.6 MMOL/L (ref 3.5–5)
POTASSIUM BLD-SCNC: 4.7 MMOL/L (ref 3.5–5)
POTASSIUM BLD-SCNC: 4.8 MMOL/L (ref 3.5–5)
POTASSIUM BLD-SCNC: 4.9 MMOL/L (ref 3.5–5)
POTASSIUM BLD-SCNC: 5.2 MMOL/L (ref 3.5–5)
POTASSIUM BLD-SCNC: 5.2 MMOL/L (ref 3.5–5)
PR INTERVAL - MUSE: 84 MS
PROCALCITONIN SERPL-MCNC: 0.16 NG/ML (ref 0–0.49)
PROCALCITONIN SERPL-MCNC: 0.5 NG/ML (ref 0–0.49)
PROT FLD-MCNC: 2.1 G/DL
PROT SERPL-MCNC: 5.4 G/DL (ref 6–8)
PROT SERPL-MCNC: 5.7 G/DL (ref 6–8)
PROT SERPL-MCNC: 5.9 G/DL (ref 6–8)
PROT SERPL-MCNC: 6.4 G/DL (ref 6–8)
PSV (LAB BLOOD GAS): 14 CM H2O
QRS DURATION - MUSE: 168 MS
QT - MUSE: 460 MS
QTC - MUSE: 503 MS
R AXIS - MUSE: 229 DEGREES
RATE: 16 RR/MIN
RBC # BLD AUTO: 2.2 10E6/UL (ref 4.4–5.9)
RBC # BLD AUTO: 2.3 10E6/UL (ref 4.4–5.9)
RBC # BLD AUTO: 2.33 10E6/UL (ref 4.4–5.9)
RBC # BLD AUTO: 2.34 10E6/UL (ref 4.4–5.9)
RBC # BLD AUTO: 2.39 10E6/UL (ref 4.4–5.9)
RBC # BLD AUTO: 2.39 10E6/UL (ref 4.4–5.9)
RBC # BLD AUTO: 2.4 10E6/UL (ref 4.4–5.9)
RBC # BLD AUTO: 2.44 10E6/UL (ref 4.4–5.9)
RBC # BLD AUTO: 2.53 10E6/UL (ref 4.4–5.9)
RBC # BLD AUTO: 2.56 10E6/UL (ref 4.4–5.9)
RBC # BLD AUTO: 2.56 10E6/UL (ref 4.4–5.9)
RBC # BLD AUTO: 2.6 10E6/UL (ref 4.4–5.9)
RBC # BLD AUTO: 2.62 10E6/UL (ref 4.4–5.9)
RBC # FLD: 5000 /UL
RBC MORPH BLD: ABNORMAL
RBC URINE: 4 /HPF
RETICS # AUTO: 0.08 10E6/UL (ref 0.01–0.11)
RETICS # AUTO: 0.1 10E6/UL (ref 0.01–0.11)
RETICS/RBC NFR AUTO: 3.2 % (ref 0.8–2.7)
RETICS/RBC NFR AUTO: 4.3 % (ref 0.8–2.7)
RSV RNA SPEC QL NAA+PROBE: NOT DETECTED
RSV RNA SPEC QL NAA+PROBE: NOT DETECTED
RV+EV RNA SPEC QL NAA+PROBE: NOT DETECTED
SARS-COV-2 RNA RESP QL NAA+PROBE: NEGATIVE
SODIUM SERPL-SCNC: 140 MMOL/L (ref 136–145)
SODIUM SERPL-SCNC: 141 MMOL/L (ref 136–145)
SODIUM SERPL-SCNC: 142 MMOL/L (ref 136–145)
SODIUM SERPL-SCNC: 143 MMOL/L (ref 136–145)
SODIUM SERPL-SCNC: 144 MMOL/L (ref 136–145)
SODIUM UR-SCNC: 66 MMOL/L
SP GR UR STRIP: 1.02 (ref 1–1.03)
SQUAMOUS EPITHELIAL: 1 /HPF
STOMATOCYTES BLD QL SMEAR: SLIGHT
STOMATOCYTES BLD QL SMEAR: SLIGHT
SYSTOLIC BLOOD PRESSURE - MUSE: 119 MMHG
T AXIS - MUSE: 26 DEGREES
TEMPERATURE: 37 DEGREES C
TIBC SERPL-MCNC: 164 UG/DL (ref 313–563)
TRANSFERRIN SERPL-MCNC: 131 MG/DL (ref 212–360)
TROPONIN I SERPL-MCNC: 0.04 NG/ML (ref 0–0.29)
TROPONIN I SERPL-MCNC: 0.05 NG/ML (ref 0–0.29)
TSH SERPL DL<=0.005 MIU/L-ACNC: 2.04 UIU/ML (ref 0.3–5)
UPPER GI ENDOSCOPY: NORMAL
UROBILINOGEN UR STRIP-MCNC: <2 MG/DL
VENTILATION MODE: ABNORMAL
VENTRICULAR RATE- MUSE: 72 BPM
VIT B12 SERPL-MCNC: >2000 PG/ML (ref 213–816)
WBC # BLD AUTO: 25.4 10E3/UL (ref 4–11)
WBC # BLD AUTO: 25.6 10E3/UL (ref 4–11)
WBC # BLD AUTO: 26.5 10E3/UL (ref 4–11)
WBC # BLD AUTO: 29 10E3/UL (ref 4–11)
WBC # BLD AUTO: 30 10E3/UL (ref 4–11)
WBC # BLD AUTO: 32.6 10E3/UL (ref 4–11)
WBC # BLD AUTO: 36.5 10E3/UL (ref 4–11)
WBC # BLD AUTO: 37.4 10E3/UL (ref 4–11)
WBC # BLD AUTO: 46.7 10E3/UL (ref 4–11)
WBC # BLD AUTO: 47.6 10E3/UL (ref 4–11)
WBC # BLD AUTO: 56.5 10E3/UL (ref 4–11)
WBC # BLD AUTO: 60.6 10E3/UL (ref 4–11)
WBC # BLD AUTO: 62.3 10E3/UL (ref 4–11)
WBC # FLD AUTO: 907 /UL
WBC URINE: 19 /HPF

## 2021-01-01 PROCEDURE — 250N000011 HC RX IP 250 OP 636: Performed by: INTERNAL MEDICINE

## 2021-01-01 PROCEDURE — 80048 BASIC METABOLIC PNL TOTAL CA: CPT | Performed by: INTERNAL MEDICINE

## 2021-01-01 PROCEDURE — 36415 COLL VENOUS BLD VENIPUNCTURE: CPT | Performed by: INTERNAL MEDICINE

## 2021-01-01 PROCEDURE — 84155 ASSAY OF PROTEIN SERUM: CPT | Performed by: INTERNAL MEDICINE

## 2021-01-01 PROCEDURE — 250N000013 HC RX MED GY IP 250 OP 250 PS 637: Performed by: INTERNAL MEDICINE

## 2021-01-01 PROCEDURE — 94660 CPAP INITIATION&MGMT: CPT

## 2021-01-01 PROCEDURE — 258N000003 HC RX IP 258 OP 636: Performed by: INTERNAL MEDICINE

## 2021-01-01 PROCEDURE — 80053 COMPREHEN METABOLIC PANEL: CPT | Performed by: FAMILY MEDICINE

## 2021-01-01 PROCEDURE — 97535 SELF CARE MNGMENT TRAINING: CPT | Mod: GO

## 2021-01-01 PROCEDURE — 250N000011 HC RX IP 250 OP 636: Performed by: GENERAL ACUTE CARE HOSPITAL

## 2021-01-01 PROCEDURE — 120N000001 HC R&B MED SURG/OB

## 2021-01-01 PROCEDURE — 92526 ORAL FUNCTION THERAPY: CPT | Mod: GN | Performed by: SPEECH-LANGUAGE PATHOLOGIST

## 2021-01-01 PROCEDURE — 94640 AIRWAY INHALATION TREATMENT: CPT

## 2021-01-01 PROCEDURE — 250N000013 HC RX MED GY IP 250 OP 250 PS 637: Performed by: NURSE PRACTITIONER

## 2021-01-01 PROCEDURE — 93005 ELECTROCARDIOGRAM TRACING: CPT

## 2021-01-01 PROCEDURE — G0463 HOSPITAL OUTPT CLINIC VISIT: HCPCS

## 2021-01-01 PROCEDURE — 85027 COMPLETE CBC AUTOMATED: CPT | Performed by: PHYSICIAN ASSISTANT

## 2021-01-01 PROCEDURE — 94640 AIRWAY INHALATION TREATMENT: CPT | Mod: 76

## 2021-01-01 PROCEDURE — 99233 SBSQ HOSP IP/OBS HIGH 50: CPT | Performed by: INTERNAL MEDICINE

## 2021-01-01 PROCEDURE — 85045 AUTOMATED RETICULOCYTE COUNT: CPT | Performed by: PHYSICIAN ASSISTANT

## 2021-01-01 PROCEDURE — 83605 ASSAY OF LACTIC ACID: CPT | Performed by: INTERNAL MEDICINE

## 2021-01-01 PROCEDURE — 88305 TISSUE EXAM BY PATHOLOGIST: CPT | Mod: TC | Performed by: INTERNAL MEDICINE

## 2021-01-01 PROCEDURE — 84145 PROCALCITONIN (PCT): CPT | Performed by: INTERNAL MEDICINE

## 2021-01-01 PROCEDURE — 999N000141 HC STATISTIC PRE-PROCEDURE NURSING ASSESSMENT: Performed by: INTERNAL MEDICINE

## 2021-01-01 PROCEDURE — 999N000208 ECHOCARDIOGRAM COMPLETE

## 2021-01-01 PROCEDURE — 99223 1ST HOSP IP/OBS HIGH 75: CPT | Performed by: GENERAL ACUTE CARE HOSPITAL

## 2021-01-01 PROCEDURE — 120N000004 HC R&B MS OVERFLOW

## 2021-01-01 PROCEDURE — 71046 X-RAY EXAM CHEST 2 VIEWS: CPT

## 2021-01-01 PROCEDURE — 99223 1ST HOSP IP/OBS HIGH 75: CPT | Performed by: NURSE PRACTITIONER

## 2021-01-01 PROCEDURE — 99223 1ST HOSP IP/OBS HIGH 75: CPT | Mod: 25 | Performed by: INTERNAL MEDICINE

## 2021-01-01 PROCEDURE — 97530 THERAPEUTIC ACTIVITIES: CPT | Mod: GP

## 2021-01-01 PROCEDURE — 97110 THERAPEUTIC EXERCISES: CPT | Mod: GO

## 2021-01-01 PROCEDURE — 999N000157 HC STATISTIC RCP TIME EA 10 MIN

## 2021-01-01 PROCEDURE — 96361 HYDRATE IV INFUSION ADD-ON: CPT

## 2021-01-01 PROCEDURE — 99207 PR CONSULT E&M CHANGED TO INITIAL LEVEL: CPT | Performed by: INTERNAL MEDICINE

## 2021-01-01 PROCEDURE — 83880 ASSAY OF NATRIURETIC PEPTIDE: CPT | Performed by: INTERNAL MEDICINE

## 2021-01-01 PROCEDURE — 85014 HEMATOCRIT: CPT | Performed by: INTERNAL MEDICINE

## 2021-01-01 PROCEDURE — 250N000009 HC RX 250: Performed by: INTERNAL MEDICINE

## 2021-01-01 PROCEDURE — 71045 X-RAY EXAM CHEST 1 VIEW: CPT

## 2021-01-01 PROCEDURE — 258N000003 HC RX IP 258 OP 636

## 2021-01-01 PROCEDURE — 96366 THER/PROPH/DIAG IV INF ADDON: CPT

## 2021-01-01 PROCEDURE — 250N000012 HC RX MED GY IP 250 OP 636 PS 637: Performed by: INTERNAL MEDICINE

## 2021-01-01 PROCEDURE — 87205 SMEAR GRAM STAIN: CPT | Performed by: INTERNAL MEDICINE

## 2021-01-01 PROCEDURE — 32555 ASPIRATE PLEURA W/ IMAGING: CPT | Mod: LT | Performed by: INTERNAL MEDICINE

## 2021-01-01 PROCEDURE — 272N000202 HC AEROBIKA WITH MANOMETER

## 2021-01-01 PROCEDURE — 85027 COMPLETE CBC AUTOMATED: CPT | Performed by: INTERNAL MEDICINE

## 2021-01-01 PROCEDURE — 92526 ORAL FUNCTION THERAPY: CPT | Mod: GN

## 2021-01-01 PROCEDURE — 99220 PR INITIAL OBSERVATION CARE,LEVEL III: CPT | Performed by: INTERNAL MEDICINE

## 2021-01-01 PROCEDURE — 96365 THER/PROPH/DIAG IV INF INIT: CPT | Mod: 59

## 2021-01-01 PROCEDURE — 999N000215 HC STATISTIC HFNC ADULT NON-CPAP

## 2021-01-01 PROCEDURE — 84443 ASSAY THYROID STIM HORMONE: CPT | Performed by: PHYSICIAN ASSISTANT

## 2021-01-01 PROCEDURE — 36415 COLL VENOUS BLD VENIPUNCTURE: CPT | Performed by: PHYSICIAN ASSISTANT

## 2021-01-01 PROCEDURE — 82040 ASSAY OF SERUM ALBUMIN: CPT | Performed by: INTERNAL MEDICINE

## 2021-01-01 PROCEDURE — 99356 PR PROLONGED SERV,INPATIENT,1ST HR: CPT | Performed by: NURSE PRACTITIONER

## 2021-01-01 PROCEDURE — 272N000001 HC OR GENERAL SUPPLY STERILE: Performed by: INTERNAL MEDICINE

## 2021-01-01 PROCEDURE — 250N000009 HC RX 250: Performed by: EMERGENCY MEDICINE

## 2021-01-01 PROCEDURE — 258N000003 HC RX IP 258 OP 636: Performed by: ANESTHESIOLOGY

## 2021-01-01 PROCEDURE — 99233 SBSQ HOSP IP/OBS HIGH 50: CPT | Mod: 25 | Performed by: INTERNAL MEDICINE

## 2021-01-01 PROCEDURE — 999N000158 HC STATISTIC RCP TIME ED VENT EA 10 MIN

## 2021-01-01 PROCEDURE — 0W9B3ZZ DRAINAGE OF LEFT PLEURAL CAVITY, PERCUTANEOUS APPROACH: ICD-10-PCS | Performed by: INTERNAL MEDICINE

## 2021-01-01 PROCEDURE — 85027 COMPLETE CBC AUTOMATED: CPT | Performed by: FAMILY MEDICINE

## 2021-01-01 PROCEDURE — 255N000002 HC RX 255 OP 636: Performed by: INTERNAL MEDICINE

## 2021-01-01 PROCEDURE — 89050 BODY FLUID CELL COUNT: CPT | Performed by: INTERNAL MEDICINE

## 2021-01-01 PROCEDURE — 94799 UNLISTED PULMONARY SVC/PX: CPT

## 2021-01-01 PROCEDURE — 87581 M.PNEUMON DNA AMP PROBE: CPT | Performed by: INTERNAL MEDICINE

## 2021-01-01 PROCEDURE — 82805 BLOOD GASES W/O2 SATURATION: CPT

## 2021-01-01 PROCEDURE — 87075 CULTR BACTERIA EXCEPT BLOOD: CPT | Performed by: INTERNAL MEDICINE

## 2021-01-01 PROCEDURE — 36415 COLL VENOUS BLD VENIPUNCTURE: CPT | Performed by: FAMILY MEDICINE

## 2021-01-01 PROCEDURE — 82565 ASSAY OF CREATININE: CPT | Performed by: FAMILY MEDICINE

## 2021-01-01 PROCEDURE — 85027 COMPLETE CBC AUTOMATED: CPT | Performed by: EMERGENCY MEDICINE

## 2021-01-01 PROCEDURE — 97166 OT EVAL MOD COMPLEX 45 MIN: CPT | Mod: GO

## 2021-01-01 PROCEDURE — 36415 COLL VENOUS BLD VENIPUNCTURE: CPT | Performed by: EMERGENCY MEDICINE

## 2021-01-01 PROCEDURE — 96360 HYDRATION IV INFUSION INIT: CPT | Mod: 59

## 2021-01-01 PROCEDURE — 81001 URINALYSIS AUTO W/SCOPE: CPT | Performed by: EMERGENCY MEDICINE

## 2021-01-01 PROCEDURE — 36415 COLL VENOUS BLD VENIPUNCTURE: CPT | Performed by: HOSPITALIST

## 2021-01-01 PROCEDURE — 99232 SBSQ HOSP IP/OBS MODERATE 35: CPT | Performed by: INTERNAL MEDICINE

## 2021-01-01 PROCEDURE — 87086 URINE CULTURE/COLONY COUNT: CPT | Performed by: EMERGENCY MEDICINE

## 2021-01-01 PROCEDURE — 83615 LACTATE (LD) (LDH) ENZYME: CPT | Performed by: INTERNAL MEDICINE

## 2021-01-01 PROCEDURE — 88305 TISSUE EXAM BY PATHOLOGIST: CPT | Mod: 26 | Performed by: PATHOLOGY

## 2021-01-01 PROCEDURE — 99232 SBSQ HOSP IP/OBS MODERATE 35: CPT | Performed by: FAMILY MEDICINE

## 2021-01-01 PROCEDURE — 70450 CT HEAD/BRAIN W/O DYE: CPT

## 2021-01-01 PROCEDURE — 99285 EMERGENCY DEPT VISIT HI MDM: CPT | Mod: 25

## 2021-01-01 PROCEDURE — 82945 GLUCOSE OTHER FLUID: CPT | Performed by: INTERNAL MEDICINE

## 2021-01-01 PROCEDURE — 87635 SARS-COV-2 COVID-19 AMP PRB: CPT | Performed by: FAMILY MEDICINE

## 2021-01-01 PROCEDURE — 82746 ASSAY OF FOLIC ACID SERUM: CPT | Performed by: PHYSICIAN ASSISTANT

## 2021-01-01 PROCEDURE — 99207 PR CDG-MDM COMPONENT: MEETS MODERATE - DOWN CODED: CPT | Performed by: FAMILY MEDICINE

## 2021-01-01 PROCEDURE — 250N000009 HC RX 250: Performed by: NURSE ANESTHETIST, CERTIFIED REGISTERED

## 2021-01-01 PROCEDURE — 87206 SMEAR FLUORESCENT/ACID STAI: CPT | Performed by: INTERNAL MEDICINE

## 2021-01-01 PROCEDURE — 97530 THERAPEUTIC ACTIVITIES: CPT | Mod: GP | Performed by: PHYSICAL THERAPIST

## 2021-01-01 PROCEDURE — 89051 BODY FLUID CELL COUNT: CPT | Performed by: INTERNAL MEDICINE

## 2021-01-01 PROCEDURE — 96367 TX/PROPH/DG ADDL SEQ IV INF: CPT

## 2021-01-01 PROCEDURE — 83540 ASSAY OF IRON: CPT | Performed by: PHYSICIAN ASSISTANT

## 2021-01-01 PROCEDURE — 97110 THERAPEUTIC EXERCISES: CPT | Mod: GP

## 2021-01-01 PROCEDURE — 85018 HEMOGLOBIN: CPT | Performed by: FAMILY MEDICINE

## 2021-01-01 PROCEDURE — 250N000011 HC RX IP 250 OP 636: Performed by: NURSE ANESTHETIST, CERTIFIED REGISTERED

## 2021-01-01 PROCEDURE — 96372 THER/PROPH/DIAG INJ SC/IM: CPT | Performed by: INTERNAL MEDICINE

## 2021-01-01 PROCEDURE — G0378 HOSPITAL OBSERVATION PER HR: HCPCS

## 2021-01-01 PROCEDURE — 99239 HOSP IP/OBS DSCHRG MGMT >30: CPT | Performed by: FAMILY MEDICINE

## 2021-01-01 PROCEDURE — 87635 SARS-COV-2 COVID-19 AMP PRB: CPT | Performed by: EMERGENCY MEDICINE

## 2021-01-01 PROCEDURE — 36600 WITHDRAWAL OF ARTERIAL BLOOD: CPT

## 2021-01-01 PROCEDURE — 96376 TX/PRO/DX INJ SAME DRUG ADON: CPT

## 2021-01-01 PROCEDURE — 258N000003 HC RX IP 258 OP 636: Performed by: EMERGENCY MEDICINE

## 2021-01-01 PROCEDURE — 250N000011 HC RX IP 250 OP 636: Performed by: EMERGENCY MEDICINE

## 2021-01-01 PROCEDURE — 87116 MYCOBACTERIA CULTURE: CPT | Performed by: INTERNAL MEDICINE

## 2021-01-01 PROCEDURE — 82248 BILIRUBIN DIRECT: CPT | Performed by: EMERGENCY MEDICINE

## 2021-01-01 PROCEDURE — 93306 TTE W/DOPPLER COMPLETE: CPT | Mod: 26 | Performed by: INTERNAL MEDICINE

## 2021-01-01 PROCEDURE — 370N000017 HC ANESTHESIA TECHNICAL FEE, PER MIN: Performed by: INTERNAL MEDICINE

## 2021-01-01 PROCEDURE — 32555 ASPIRATE PLEURA W/ IMAGING: CPT | Mod: RT | Performed by: INTERNAL MEDICINE

## 2021-01-01 PROCEDURE — 92610 EVALUATE SWALLOWING FUNCTION: CPT | Mod: GN | Performed by: SPEECH-LANGUAGE PATHOLOGIST

## 2021-01-01 PROCEDURE — 250N000013 HC RX MED GY IP 250 OP 250 PS 637: Performed by: PHYSICIAN ASSISTANT

## 2021-01-01 PROCEDURE — 82805 BLOOD GASES W/O2 SATURATION: CPT | Performed by: INTERNAL MEDICINE

## 2021-01-01 PROCEDURE — 99232 SBSQ HOSP IP/OBS MODERATE 35: CPT | Performed by: GENERAL ACUTE CARE HOSPITAL

## 2021-01-01 PROCEDURE — 87635 SARS-COV-2 COVID-19 AMP PRB: CPT | Performed by: INTERNAL MEDICINE

## 2021-01-01 PROCEDURE — 250N000013 HC RX MED GY IP 250 OP 250 PS 637: Performed by: GENERAL ACUTE CARE HOSPITAL

## 2021-01-01 PROCEDURE — 84157 ASSAY OF PROTEIN OTHER: CPT | Performed by: INTERNAL MEDICINE

## 2021-01-01 PROCEDURE — 97110 THERAPEUTIC EXERCISES: CPT | Mod: GP | Performed by: PHYSICAL THERAPIST

## 2021-01-01 PROCEDURE — 96375 TX/PRO/DX INJ NEW DRUG ADDON: CPT

## 2021-01-01 PROCEDURE — 84484 ASSAY OF TROPONIN QUANT: CPT | Performed by: INTERNAL MEDICINE

## 2021-01-01 PROCEDURE — 999N000127 HC STATISTIC PERIPHERAL IV START W US GUIDANCE

## 2021-01-01 PROCEDURE — 0W993ZZ DRAINAGE OF RIGHT PLEURAL CAVITY, PERCUTANEOUS APPROACH: ICD-10-PCS | Performed by: INTERNAL MEDICINE

## 2021-01-01 PROCEDURE — 99233 SBSQ HOSP IP/OBS HIGH 50: CPT | Performed by: NURSE PRACTITIONER

## 2021-01-01 PROCEDURE — 99233 SBSQ HOSP IP/OBS HIGH 50: CPT | Performed by: GENERAL ACUTE CARE HOSPITAL

## 2021-01-01 PROCEDURE — 84484 ASSAY OF TROPONIN QUANT: CPT | Performed by: EMERGENCY MEDICINE

## 2021-01-01 PROCEDURE — 83986 ASSAY PH BODY FLUID NOS: CPT | Performed by: INTERNAL MEDICINE

## 2021-01-01 PROCEDURE — 83605 ASSAY OF LACTIC ACID: CPT | Performed by: FAMILY MEDICINE

## 2021-01-01 PROCEDURE — 88112 CYTOPATH CELL ENHANCE TECH: CPT | Mod: 26 | Performed by: PATHOLOGY

## 2021-01-01 PROCEDURE — 97162 PT EVAL MOD COMPLEX 30 MIN: CPT | Mod: GP

## 2021-01-01 PROCEDURE — 82607 VITAMIN B-12: CPT | Performed by: PHYSICIAN ASSISTANT

## 2021-01-01 PROCEDURE — 99207 PR CDG-MDM COMPONENT: MEETS MODERATE - DOWN CODED: CPT | Performed by: INTERNAL MEDICINE

## 2021-01-01 PROCEDURE — 83605 ASSAY OF LACTIC ACID: CPT | Performed by: HOSPITALIST

## 2021-01-01 PROCEDURE — 85060 BLOOD SMEAR INTERPRETATION: CPT | Performed by: PATHOLOGY

## 2021-01-01 PROCEDURE — 74177 CT ABD & PELVIS W/CONTRAST: CPT

## 2021-01-01 PROCEDURE — 82728 ASSAY OF FERRITIN: CPT | Performed by: PHYSICIAN ASSISTANT

## 2021-01-01 PROCEDURE — 83935 ASSAY OF URINE OSMOLALITY: CPT | Performed by: INTERNAL MEDICINE

## 2021-01-01 PROCEDURE — 87040 BLOOD CULTURE FOR BACTERIA: CPT | Performed by: EMERGENCY MEDICINE

## 2021-01-01 PROCEDURE — 99233 SBSQ HOSP IP/OBS HIGH 50: CPT | Performed by: FAMILY MEDICINE

## 2021-01-01 PROCEDURE — 88312 SPECIAL STAINS GROUP 1: CPT | Mod: TC | Performed by: INTERNAL MEDICINE

## 2021-01-01 PROCEDURE — 999N000111 HC STATISTIC OT IP EVAL DEFER

## 2021-01-01 PROCEDURE — 84300 ASSAY OF URINE SODIUM: CPT | Performed by: INTERNAL MEDICINE

## 2021-01-01 PROCEDURE — 360N000075 HC SURGERY LEVEL 2, PER MIN: Performed by: INTERNAL MEDICINE

## 2021-01-01 PROCEDURE — C1726 CATH, BAL DIL, NON-VASCULAR: HCPCS | Performed by: INTERNAL MEDICINE

## 2021-01-01 PROCEDURE — C9803 HOPD COVID-19 SPEC COLLECT: HCPCS

## 2021-01-01 PROCEDURE — 83605 ASSAY OF LACTIC ACID: CPT | Performed by: EMERGENCY MEDICINE

## 2021-01-01 PROCEDURE — 93005 ELECTROCARDIOGRAM TRACING: CPT | Performed by: EMERGENCY MEDICINE

## 2021-01-01 RX ORDER — NALOXONE HYDROCHLORIDE 0.4 MG/ML
0.4 INJECTION, SOLUTION INTRAMUSCULAR; INTRAVENOUS; SUBCUTANEOUS
Status: DISCONTINUED | OUTPATIENT
Start: 2021-01-01 | End: 2021-01-01 | Stop reason: HOSPADM

## 2021-01-01 RX ORDER — PANTOPRAZOLE SODIUM 20 MG/1
40 TABLET, DELAYED RELEASE ORAL
Status: DISCONTINUED | OUTPATIENT
Start: 2021-01-01 | End: 2021-01-01 | Stop reason: HOSPADM

## 2021-01-01 RX ORDER — SODIUM CHLORIDE, SODIUM LACTATE, POTASSIUM CHLORIDE, CALCIUM CHLORIDE 600; 310; 30; 20 MG/100ML; MG/100ML; MG/100ML; MG/100ML
INJECTION, SOLUTION INTRAVENOUS CONTINUOUS
Status: DISCONTINUED | OUTPATIENT
Start: 2021-01-01 | End: 2021-01-01 | Stop reason: HOSPADM

## 2021-01-01 RX ORDER — FUROSEMIDE 10 MG/ML
40 INJECTION INTRAMUSCULAR; INTRAVENOUS EVERY 12 HOURS
Status: DISCONTINUED | OUTPATIENT
Start: 2021-01-01 | End: 2021-01-01

## 2021-01-01 RX ORDER — LEVOFLOXACIN 5 MG/ML
500 INJECTION, SOLUTION INTRAVENOUS EVERY 24 HOURS
Status: DISCONTINUED | OUTPATIENT
Start: 2021-01-01 | End: 2021-01-01

## 2021-01-01 RX ORDER — MULTIVIT WITH MINERALS/LUTEIN
1000 TABLET ORAL DAILY
Status: ON HOLD | COMMUNITY
End: 2021-01-01

## 2021-01-01 RX ORDER — HYDROMORPHONE HYDROCHLORIDE 1 MG/ML
0.5 SOLUTION ORAL EVERY 4 HOURS PRN
Status: DISCONTINUED | OUTPATIENT
Start: 2021-01-01 | End: 2021-01-01 | Stop reason: HOSPADM

## 2021-01-01 RX ORDER — BISACODYL 10 MG
10 SUPPOSITORY, RECTAL RECTAL DAILY PRN
Refills: 0
Start: 2021-01-01 | End: 2021-12-10

## 2021-01-01 RX ORDER — MIDODRINE HYDROCHLORIDE 5 MG/1
5 TABLET ORAL ONCE
Status: COMPLETED | OUTPATIENT
Start: 2021-01-01 | End: 2021-01-01

## 2021-01-01 RX ORDER — SODIUM CHLORIDE 9 MG/ML
INJECTION, SOLUTION INTRAVENOUS CONTINUOUS
Status: ACTIVE | OUTPATIENT
Start: 2021-01-01 | End: 2021-01-01

## 2021-01-01 RX ORDER — PROPOFOL 10 MG/ML
INJECTION, EMULSION INTRAVENOUS CONTINUOUS PRN
Status: DISCONTINUED | OUTPATIENT
Start: 2021-01-01 | End: 2021-01-01

## 2021-01-01 RX ORDER — HYDROMORPHONE HYDROCHLORIDE 1 MG/ML
0.5 SOLUTION ORAL EVERY 8 HOURS
Qty: 30 ML | Refills: 0
Start: 2021-01-01

## 2021-01-01 RX ORDER — HEPARIN SODIUM 5000 [USP'U]/.5ML
5000 INJECTION, SOLUTION INTRAVENOUS; SUBCUTANEOUS EVERY 12 HOURS
Status: DISCONTINUED | OUTPATIENT
Start: 2021-01-01 | End: 2021-01-01 | Stop reason: HOSPADM

## 2021-01-01 RX ORDER — LACTOBACILLUS RHAMNOSUS GG 10B CELL
1 CAPSULE ORAL 2 TIMES DAILY
Status: DISCONTINUED | OUTPATIENT
Start: 2021-01-01 | End: 2021-01-01

## 2021-01-01 RX ORDER — PIPERACILLIN SODIUM, TAZOBACTAM SODIUM 3; .375 G/15ML; G/15ML
3.38 INJECTION, POWDER, LYOPHILIZED, FOR SOLUTION INTRAVENOUS EVERY 8 HOURS
Status: DISCONTINUED | OUTPATIENT
Start: 2021-01-01 | End: 2021-01-01

## 2021-01-01 RX ORDER — NALOXONE HYDROCHLORIDE 0.4 MG/ML
0.2 INJECTION, SOLUTION INTRAMUSCULAR; INTRAVENOUS; SUBCUTANEOUS
Status: DISCONTINUED | OUTPATIENT
Start: 2021-01-01 | End: 2021-01-01

## 2021-01-01 RX ORDER — FUROSEMIDE 10 MG/ML
40 INJECTION INTRAMUSCULAR; INTRAVENOUS EVERY 6 HOURS
Status: DISCONTINUED | OUTPATIENT
Start: 2021-01-01 | End: 2021-01-01

## 2021-01-01 RX ORDER — IPRATROPIUM BROMIDE AND ALBUTEROL SULFATE 2.5; .5 MG/3ML; MG/3ML
3 SOLUTION RESPIRATORY (INHALATION)
Status: DISCONTINUED | OUTPATIENT
Start: 2021-01-01 | End: 2021-01-01 | Stop reason: HOSPADM

## 2021-01-01 RX ORDER — DOXYCYCLINE 100 MG/10ML
100 INJECTION, POWDER, LYOPHILIZED, FOR SOLUTION INTRAVENOUS ONCE
Status: COMPLETED | OUTPATIENT
Start: 2021-01-01 | End: 2021-01-01

## 2021-01-01 RX ORDER — FUROSEMIDE 10 MG/ML
40 INJECTION INTRAMUSCULAR; INTRAVENOUS DAILY
Status: DISCONTINUED | OUTPATIENT
Start: 2021-01-01 | End: 2021-01-01

## 2021-01-01 RX ORDER — FLUMAZENIL 0.1 MG/ML
0.2 INJECTION, SOLUTION INTRAVENOUS
Status: ACTIVE | OUTPATIENT
Start: 2021-01-01 | End: 2021-01-01

## 2021-01-01 RX ORDER — BISACODYL 10 MG
10 SUPPOSITORY, RECTAL RECTAL DAILY PRN
Status: DISCONTINUED | OUTPATIENT
Start: 2021-01-01 | End: 2021-01-01 | Stop reason: HOSPADM

## 2021-01-01 RX ORDER — SODIUM CHLORIDE 9 MG/ML
INJECTION, SOLUTION INTRAVENOUS CONTINUOUS
Status: DISCONTINUED | OUTPATIENT
Start: 2021-01-01 | End: 2021-01-01

## 2021-01-01 RX ORDER — AMOXICILLIN 250 MG
1 CAPSULE ORAL DAILY
COMMUNITY

## 2021-01-01 RX ORDER — MAGNESIUM HYDROXIDE/ALUMINUM HYDROXICE/SIMETHICONE 120; 1200; 1200 MG/30ML; MG/30ML; MG/30ML
30 SUSPENSION ORAL EVERY 4 HOURS PRN
Status: DISCONTINUED | OUTPATIENT
Start: 2021-01-01 | End: 2021-01-01 | Stop reason: HOSPADM

## 2021-01-01 RX ORDER — LIDOCAINE 40 MG/G
CREAM TOPICAL
Status: DISCONTINUED | OUTPATIENT
Start: 2021-01-01 | End: 2021-01-01 | Stop reason: HOSPADM

## 2021-01-01 RX ORDER — DIPHENHYDRAMINE HCL 50 MG
50 CAPSULE ORAL
Status: DISCONTINUED | OUTPATIENT
Start: 2021-01-01 | End: 2021-01-01 | Stop reason: HOSPADM

## 2021-01-01 RX ORDER — ONDANSETRON 2 MG/ML
4 INJECTION INTRAMUSCULAR; INTRAVENOUS EVERY 6 HOURS PRN
Status: DISCONTINUED | OUTPATIENT
Start: 2021-01-01 | End: 2021-01-01 | Stop reason: HOSPADM

## 2021-01-01 RX ORDER — METOLAZONE 5 MG/1
5 TABLET ORAL ONCE
Status: COMPLETED | OUTPATIENT
Start: 2021-01-01 | End: 2021-01-01

## 2021-01-01 RX ORDER — AMOXICILLIN AND CLAVULANATE POTASSIUM 500; 125 MG/1; MG/1
1 TABLET, FILM COATED ORAL 2 TIMES DAILY
Qty: 10 TABLET | Refills: 0
Start: 2021-01-01 | End: 2021-11-15

## 2021-01-01 RX ORDER — LIDOCAINE HYDROCHLORIDE 20 MG/ML
INJECTION, SOLUTION INFILTRATION; PERINEURAL PRN
Status: DISCONTINUED | OUTPATIENT
Start: 2021-01-01 | End: 2021-01-01

## 2021-01-01 RX ORDER — ALBUTEROL SULFATE 90 UG/1
2 AEROSOL, METERED RESPIRATORY (INHALATION) EVERY 4 HOURS PRN
Status: DISCONTINUED | OUTPATIENT
Start: 2021-01-01 | End: 2021-01-01

## 2021-01-01 RX ORDER — METHYLPREDNISOLONE SODIUM SUCCINATE 40 MG/ML
40 INJECTION, POWDER, LYOPHILIZED, FOR SOLUTION INTRAMUSCULAR; INTRAVENOUS EVERY 8 HOURS
Status: DISCONTINUED | OUTPATIENT
Start: 2021-01-01 | End: 2021-01-01

## 2021-01-01 RX ORDER — ALBUTEROL SULFATE 5 MG/ML
2.5 SOLUTION RESPIRATORY (INHALATION) EVERY 4 HOURS PRN
Status: DISCONTINUED | OUTPATIENT
Start: 2021-01-01 | End: 2021-01-01 | Stop reason: HOSPADM

## 2021-01-01 RX ORDER — LANOLIN ALCOHOL/MO/W.PET/CERES
3 CREAM (GRAM) TOPICAL
Status: DISCONTINUED | OUTPATIENT
Start: 2021-01-01 | End: 2021-01-01 | Stop reason: HOSPADM

## 2021-01-01 RX ORDER — LORAZEPAM 2 MG/ML
1 CONCENTRATE ORAL EVERY 6 HOURS PRN
Qty: 30 ML | Refills: 0
Start: 2021-01-01

## 2021-01-01 RX ORDER — POLYETHYLENE GLYCOL 3350 17 G/17G
17 POWDER, FOR SOLUTION ORAL DAILY
Status: DISCONTINUED | OUTPATIENT
Start: 2021-01-01 | End: 2021-01-01 | Stop reason: HOSPADM

## 2021-01-01 RX ORDER — CIPROFLOXACIN 2 MG/ML
400 INJECTION, SOLUTION INTRAVENOUS EVERY 12 HOURS
Status: DISCONTINUED | OUTPATIENT
Start: 2021-01-01 | End: 2021-01-01 | Stop reason: ALTCHOICE

## 2021-01-01 RX ORDER — POLYETHYLENE GLYCOL 3350 17 G/17G
17 POWDER, FOR SOLUTION ORAL DAILY
Qty: 510 G | Refills: 0
Start: 2021-01-01

## 2021-01-01 RX ORDER — CEFTRIAXONE 2 G/1
2 INJECTION, POWDER, FOR SOLUTION INTRAMUSCULAR; INTRAVENOUS ONCE
Status: COMPLETED | OUTPATIENT
Start: 2021-01-01 | End: 2021-01-01

## 2021-01-01 RX ORDER — MIDODRINE HYDROCHLORIDE 2.5 MG/1
2.5 TABLET ORAL ONCE
Status: DISCONTINUED | OUTPATIENT
Start: 2021-01-01 | End: 2021-01-01

## 2021-01-01 RX ORDER — LORAZEPAM 2 MG/ML
1 CONCENTRATE ORAL EVERY 6 HOURS PRN
Status: DISCONTINUED | OUTPATIENT
Start: 2021-01-01 | End: 2021-01-01 | Stop reason: HOSPADM

## 2021-01-01 RX ORDER — MIDODRINE HYDROCHLORIDE 5 MG/1
10 TABLET ORAL
Status: DISCONTINUED | OUTPATIENT
Start: 2021-01-01 | End: 2021-01-01

## 2021-01-01 RX ORDER — PROPOFOL 10 MG/ML
INJECTION, EMULSION INTRAVENOUS PRN
Status: DISCONTINUED | OUTPATIENT
Start: 2021-01-01 | End: 2021-01-01

## 2021-01-01 RX ORDER — ONDANSETRON 2 MG/ML
4 INJECTION INTRAMUSCULAR; INTRAVENOUS
Status: DISCONTINUED | OUTPATIENT
Start: 2021-01-01 | End: 2021-01-01 | Stop reason: HOSPADM

## 2021-01-01 RX ORDER — NALOXONE HYDROCHLORIDE 0.4 MG/ML
0.2 INJECTION, SOLUTION INTRAMUSCULAR; INTRAVENOUS; SUBCUTANEOUS
Status: DISCONTINUED | OUTPATIENT
Start: 2021-01-01 | End: 2021-01-01 | Stop reason: HOSPADM

## 2021-01-01 RX ORDER — HYDROMORPHONE HYDROCHLORIDE 1 MG/ML
0.5 SOLUTION ORAL EVERY 8 HOURS
Status: DISCONTINUED | OUTPATIENT
Start: 2021-01-01 | End: 2021-01-01 | Stop reason: HOSPADM

## 2021-01-01 RX ORDER — ACETAMINOPHEN 325 MG/1
650 TABLET ORAL EVERY 4 HOURS PRN
Status: DISCONTINUED | OUTPATIENT
Start: 2021-01-01 | End: 2021-01-01 | Stop reason: HOSPADM

## 2021-01-01 RX ORDER — MIDODRINE HYDROCHLORIDE 5 MG/1
5 TABLET ORAL EVERY 8 HOURS PRN
Status: DISCONTINUED | OUTPATIENT
Start: 2021-01-01 | End: 2021-01-01

## 2021-01-01 RX ORDER — NALOXONE HYDROCHLORIDE 0.4 MG/ML
0.4 INJECTION, SOLUTION INTRAMUSCULAR; INTRAVENOUS; SUBCUTANEOUS
Status: DISCONTINUED | OUTPATIENT
Start: 2021-01-01 | End: 2021-01-01

## 2021-01-01 RX ORDER — PIPERACILLIN SODIUM, TAZOBACTAM SODIUM 3; .375 G/15ML; G/15ML
3.38 INJECTION, POWDER, LYOPHILIZED, FOR SOLUTION INTRAVENOUS ONCE
Status: COMPLETED | OUTPATIENT
Start: 2021-01-01 | End: 2021-01-01

## 2021-01-01 RX ORDER — AMOXICILLIN 250 MG
1 CAPSULE ORAL 2 TIMES DAILY PRN
Status: DISCONTINUED | OUTPATIENT
Start: 2021-01-01 | End: 2021-01-01 | Stop reason: HOSPADM

## 2021-01-01 RX ORDER — DEXTROSE, SODIUM CHLORIDE, SODIUM LACTATE, POTASSIUM CHLORIDE, AND CALCIUM CHLORIDE 5; .6; .31; .03; .02 G/100ML; G/100ML; G/100ML; G/100ML; G/100ML
INJECTION, SOLUTION INTRAVENOUS CONTINUOUS
Status: DISCONTINUED | OUTPATIENT
Start: 2021-01-01 | End: 2021-01-01

## 2021-01-01 RX ORDER — DIPHENHYDRAMINE HYDROCHLORIDE 50 MG/ML
50 INJECTION INTRAMUSCULAR; INTRAVENOUS
Status: DISCONTINUED | OUTPATIENT
Start: 2021-01-01 | End: 2021-01-01 | Stop reason: HOSPADM

## 2021-01-01 RX ORDER — IPRATROPIUM BROMIDE AND ALBUTEROL SULFATE 2.5; .5 MG/3ML; MG/3ML
3 SOLUTION RESPIRATORY (INHALATION)
Status: DISCONTINUED | OUTPATIENT
Start: 2021-01-01 | End: 2021-01-01

## 2021-01-01 RX ORDER — AMOXICILLIN 250 MG
1 CAPSULE ORAL DAILY
Status: DISCONTINUED | OUTPATIENT
Start: 2021-01-01 | End: 2021-01-01 | Stop reason: HOSPADM

## 2021-01-01 RX ORDER — LEVOFLOXACIN 5 MG/ML
250 INJECTION, SOLUTION INTRAVENOUS EVERY 24 HOURS
Status: DISCONTINUED | OUTPATIENT
Start: 2021-01-01 | End: 2021-01-01

## 2021-01-01 RX ORDER — HYDROXYUREA 500 MG/1
500 CAPSULE ORAL DAILY
Status: DISCONTINUED | OUTPATIENT
Start: 2021-01-01 | End: 2021-01-01

## 2021-01-01 RX ORDER — MULTIVITAMIN,THERAPEUTIC
1 TABLET ORAL DAILY
Status: DISCONTINUED | OUTPATIENT
Start: 2021-01-01 | End: 2021-01-01

## 2021-01-01 RX ORDER — BENZONATATE 100 MG/1
100 CAPSULE ORAL 3 TIMES DAILY PRN
Status: DISCONTINUED | OUTPATIENT
Start: 2021-01-01 | End: 2021-01-01 | Stop reason: HOSPADM

## 2021-01-01 RX ORDER — ATROPINE SULFATE 10 MG/ML
2 SOLUTION/ DROPS OPHTHALMIC
Status: DISCONTINUED | OUTPATIENT
Start: 2021-01-01 | End: 2021-01-01 | Stop reason: HOSPADM

## 2021-01-01 RX ORDER — IOPAMIDOL 755 MG/ML
75 INJECTION, SOLUTION INTRAVASCULAR ONCE
Status: COMPLETED | OUTPATIENT
Start: 2021-01-01 | End: 2021-01-01

## 2021-01-01 RX ORDER — BISACODYL 5 MG
5 TABLET, DELAYED RELEASE (ENTERIC COATED) ORAL DAILY PRN
Status: DISCONTINUED | OUTPATIENT
Start: 2021-01-01 | End: 2021-01-01 | Stop reason: HOSPADM

## 2021-01-01 RX ORDER — SODIUM CHLORIDE 9 MG/ML
INJECTION, SOLUTION INTRAVENOUS CONTINUOUS
Status: DISCONTINUED | OUTPATIENT
Start: 2021-01-01 | End: 2021-01-01 | Stop reason: ALTCHOICE

## 2021-01-01 RX ORDER — HYDROXYUREA 500 MG/1
1000 CAPSULE ORAL DAILY
Status: DISCONTINUED | OUTPATIENT
Start: 2021-01-01 | End: 2021-01-01

## 2021-01-01 RX ORDER — PREDNISONE 20 MG/1
40 TABLET ORAL DAILY
Status: COMPLETED | OUTPATIENT
Start: 2021-01-01 | End: 2021-01-01

## 2021-01-01 RX ADMIN — IPRATROPIUM BROMIDE AND ALBUTEROL SULFATE 3 ML: 2.5; .5 SOLUTION RESPIRATORY (INHALATION) at 12:38

## 2021-01-01 RX ADMIN — FUROSEMIDE 40 MG: 10 INJECTION, SOLUTION INTRAMUSCULAR; INTRAVENOUS at 06:40

## 2021-01-01 RX ADMIN — Medication 3.75 MG: at 21:05

## 2021-01-01 RX ADMIN — IPRATROPIUM BROMIDE AND ALBUTEROL SULFATE 3 ML: 2.5; .5 SOLUTION RESPIRATORY (INHALATION) at 08:38

## 2021-01-01 RX ADMIN — PANTOPRAZOLE SODIUM 40 MG: 20 TABLET, DELAYED RELEASE ORAL at 08:33

## 2021-01-01 RX ADMIN — IPRATROPIUM BROMIDE AND ALBUTEROL SULFATE 3 ML: 2.5; .5 SOLUTION RESPIRATORY (INHALATION) at 12:49

## 2021-01-01 RX ADMIN — METHYLPREDNISOLONE SODIUM SUCCINATE 40 MG: 40 INJECTION, POWDER, FOR SOLUTION INTRAMUSCULAR; INTRAVENOUS at 16:09

## 2021-01-01 RX ADMIN — PIPERACILLIN AND TAZOBACTAM 3.38 G: 3; .375 INJECTION, POWDER, FOR SOLUTION INTRAVENOUS at 13:00

## 2021-01-01 RX ADMIN — IPRATROPIUM BROMIDE AND ALBUTEROL SULFATE 3 ML: 2.5; .5 SOLUTION RESPIRATORY (INHALATION) at 08:18

## 2021-01-01 RX ADMIN — METRONIDAZOLE 500 MG: 500 INJECTION, SOLUTION INTRAVENOUS at 09:26

## 2021-01-01 RX ADMIN — IPRATROPIUM BROMIDE AND ALBUTEROL SULFATE 3 ML: 2.5; .5 SOLUTION RESPIRATORY (INHALATION) at 15:40

## 2021-01-01 RX ADMIN — HYDROXYUREA 500 MG: 500 CAPSULE ORAL at 08:14

## 2021-01-01 RX ADMIN — HEPARIN SODIUM 5000 UNITS: 10000 INJECTION, SOLUTION INTRAVENOUS; SUBCUTANEOUS at 06:49

## 2021-01-01 RX ADMIN — PANTOPRAZOLE SODIUM 40 MG: 20 TABLET, DELAYED RELEASE ORAL at 06:42

## 2021-01-01 RX ADMIN — SODIUM CHLORIDE 250 ML: 9 INJECTION, SOLUTION INTRAVENOUS at 01:15

## 2021-01-01 RX ADMIN — IPRATROPIUM BROMIDE AND ALBUTEROL SULFATE 3 ML: 2.5; .5 SOLUTION RESPIRATORY (INHALATION) at 22:30

## 2021-01-01 RX ADMIN — SODIUM CHLORIDE 500 ML: 9 INJECTION, SOLUTION INTRAVENOUS at 23:28

## 2021-01-01 RX ADMIN — SODIUM CHLORIDE: 9 INJECTION, SOLUTION INTRAVENOUS at 18:55

## 2021-01-01 RX ADMIN — CIPROFLOXACIN 400 MG: 2 INJECTION, SOLUTION INTRAVENOUS at 11:31

## 2021-01-01 RX ADMIN — HEPARIN SODIUM 5000 UNITS: 10000 INJECTION, SOLUTION INTRAVENOUS; SUBCUTANEOUS at 06:28

## 2021-01-01 RX ADMIN — LEVOFLOXACIN 500 MG: 5 INJECTION, SOLUTION INTRAVENOUS at 16:46

## 2021-01-01 RX ADMIN — CIPROFLOXACIN 400 MG: 2 INJECTION, SOLUTION INTRAVENOUS at 11:48

## 2021-01-01 RX ADMIN — HEPARIN SODIUM 5000 UNITS: 10000 INJECTION, SOLUTION INTRAVENOUS; SUBCUTANEOUS at 06:30

## 2021-01-01 RX ADMIN — PANTOPRAZOLE SODIUM 40 MG: 20 TABLET, DELAYED RELEASE ORAL at 16:44

## 2021-01-01 RX ADMIN — DOCUSATE SODIUM 50 MG AND SENNOSIDES 8.6 MG 1 TABLET: 8.6; 5 TABLET, FILM COATED ORAL at 09:29

## 2021-01-01 RX ADMIN — METRONIDAZOLE 500 MG: 500 INJECTION, SOLUTION INTRAVENOUS at 08:16

## 2021-01-01 RX ADMIN — PANTOPRAZOLE SODIUM 40 MG: 20 TABLET, DELAYED RELEASE ORAL at 06:49

## 2021-01-01 RX ADMIN — IPRATROPIUM BROMIDE AND ALBUTEROL SULFATE 3 ML: 2.5; .5 SOLUTION RESPIRATORY (INHALATION) at 07:33

## 2021-01-01 RX ADMIN — Medication 3.75 MG: at 20:53

## 2021-01-01 RX ADMIN — IPRATROPIUM BROMIDE AND ALBUTEROL SULFATE 3 ML: 2.5; .5 SOLUTION RESPIRATORY (INHALATION) at 07:36

## 2021-01-01 RX ADMIN — METRONIDAZOLE 500 MG: 500 INJECTION, SOLUTION INTRAVENOUS at 09:30

## 2021-01-01 RX ADMIN — SODIUM CHLORIDE 100 ML/HR: 9 INJECTION, SOLUTION INTRAVENOUS at 09:43

## 2021-01-01 RX ADMIN — THERA TABS 1 TABLET: TAB at 09:35

## 2021-01-01 RX ADMIN — CEFTRIAXONE SODIUM 2 G: 2 INJECTION, POWDER, FOR SOLUTION INTRAMUSCULAR; INTRAVENOUS at 01:56

## 2021-01-01 RX ADMIN — HYDROXYUREA 500 MG: 500 CAPSULE ORAL at 09:27

## 2021-01-01 RX ADMIN — IPRATROPIUM BROMIDE AND ALBUTEROL SULFATE 3 ML: 2.5; .5 SOLUTION RESPIRATORY (INHALATION) at 20:46

## 2021-01-01 RX ADMIN — METHYLPREDNISOLONE SODIUM SUCCINATE 40 MG: 40 INJECTION, POWDER, FOR SOLUTION INTRAMUSCULAR; INTRAVENOUS at 23:35

## 2021-01-01 RX ADMIN — METRONIDAZOLE 500 MG: 500 INJECTION, SOLUTION INTRAVENOUS at 21:22

## 2021-01-01 RX ADMIN — Medication 3.75 MG: at 21:59

## 2021-01-01 RX ADMIN — IPRATROPIUM BROMIDE AND ALBUTEROL SULFATE 3 ML: 2.5; .5 SOLUTION RESPIRATORY (INHALATION) at 12:07

## 2021-01-01 RX ADMIN — PANTOPRAZOLE SODIUM 40 MG: 20 TABLET, DELAYED RELEASE ORAL at 09:31

## 2021-01-01 RX ADMIN — MELATONIN TAB 3 MG 3 MG: 3 TAB at 23:49

## 2021-01-01 RX ADMIN — LEVOFLOXACIN 250 MG: 5 INJECTION, SOLUTION INTRAVENOUS at 17:38

## 2021-01-01 RX ADMIN — PANTOPRAZOLE SODIUM 40 MG: 20 TABLET, DELAYED RELEASE ORAL at 09:30

## 2021-01-01 RX ADMIN — PANTOPRAZOLE SODIUM 40 MG: 20 TABLET, DELAYED RELEASE ORAL at 06:39

## 2021-01-01 RX ADMIN — Medication 1 CAPSULE: at 09:30

## 2021-01-01 RX ADMIN — IPRATROPIUM BROMIDE AND ALBUTEROL SULFATE 3 ML: 2.5; .5 SOLUTION RESPIRATORY (INHALATION) at 07:21

## 2021-01-01 RX ADMIN — ACETAMINOPHEN 650 MG: 325 TABLET ORAL at 15:06

## 2021-01-01 RX ADMIN — HEPARIN SODIUM 5000 UNITS: 10000 INJECTION, SOLUTION INTRAVENOUS; SUBCUTANEOUS at 06:20

## 2021-01-01 RX ADMIN — Medication 1 CAPSULE: at 20:58

## 2021-01-01 RX ADMIN — PERFLUTREN 3 ML: 6.52 INJECTION, SUSPENSION INTRAVENOUS at 11:30

## 2021-01-01 RX ADMIN — METRONIDAZOLE 500 MG: 500 INJECTION, SOLUTION INTRAVENOUS at 10:01

## 2021-01-01 RX ADMIN — DOCUSATE SODIUM 50 MG AND SENNOSIDES 8.6 MG 1 TABLET: 8.6; 5 TABLET, FILM COATED ORAL at 09:31

## 2021-01-01 RX ADMIN — HEPARIN SODIUM 5000 UNITS: 10000 INJECTION, SOLUTION INTRAVENOUS; SUBCUTANEOUS at 18:37

## 2021-01-01 RX ADMIN — PIPERACILLIN AND TAZOBACTAM 3.38 G: 3; .375 INJECTION, POWDER, FOR SOLUTION INTRAVENOUS at 19:59

## 2021-01-01 RX ADMIN — HYDROXYUREA 500 MG: 500 CAPSULE ORAL at 17:21

## 2021-01-01 RX ADMIN — POLYETHYLENE GLYCOL 3350 17 G: 17 POWDER, FOR SOLUTION ORAL at 08:38

## 2021-01-01 RX ADMIN — HEPARIN SODIUM 5000 UNITS: 10000 INJECTION, SOLUTION INTRAVENOUS; SUBCUTANEOUS at 18:22

## 2021-01-01 RX ADMIN — Medication 1 CAPSULE: at 20:53

## 2021-01-01 RX ADMIN — PIPERACILLIN AND TAZOBACTAM 3.38 G: 3; .375 INJECTION, POWDER, FOR SOLUTION INTRAVENOUS at 03:46

## 2021-01-01 RX ADMIN — METRONIDAZOLE 500 MG: 500 INJECTION, SOLUTION INTRAVENOUS at 21:56

## 2021-01-01 RX ADMIN — ACETAMINOPHEN 650 MG: 325 TABLET ORAL at 01:52

## 2021-01-01 RX ADMIN — METRONIDAZOLE 500 MG: 500 INJECTION, SOLUTION INTRAVENOUS at 08:08

## 2021-01-01 RX ADMIN — ACETAMINOPHEN 650 MG: 325 TABLET ORAL at 08:29

## 2021-01-01 RX ADMIN — Medication 3.75 MG: at 21:02

## 2021-01-01 RX ADMIN — Medication 1 CAPSULE: at 21:02

## 2021-01-01 RX ADMIN — IPRATROPIUM BROMIDE AND ALBUTEROL SULFATE 3 ML: 2.5; .5 SOLUTION RESPIRATORY (INHALATION) at 08:21

## 2021-01-01 RX ADMIN — LEVOFLOXACIN 250 MG: 5 INJECTION, SOLUTION INTRAVENOUS at 17:07

## 2021-01-01 RX ADMIN — MIDODRINE HYDROCHLORIDE 5 MG: 5 TABLET ORAL at 22:21

## 2021-01-01 RX ADMIN — IPRATROPIUM BROMIDE AND ALBUTEROL SULFATE 3 ML: 2.5; .5 SOLUTION RESPIRATORY (INHALATION) at 20:33

## 2021-01-01 RX ADMIN — ACETAMINOPHEN 650 MG: 325 TABLET ORAL at 20:41

## 2021-01-01 RX ADMIN — PROPOFOL 20 MG: 10 INJECTION, EMULSION INTRAVENOUS at 10:26

## 2021-01-01 RX ADMIN — Medication 3.75 MG: at 20:58

## 2021-01-01 RX ADMIN — SODIUM CHLORIDE 200 ML: 9 INJECTION, SOLUTION INTRAVENOUS at 14:58

## 2021-01-01 RX ADMIN — HYDROXYUREA 1000 MG: 500 CAPSULE ORAL at 08:30

## 2021-01-01 RX ADMIN — IPRATROPIUM BROMIDE AND ALBUTEROL SULFATE 3 ML: 2.5; .5 SOLUTION RESPIRATORY (INHALATION) at 16:56

## 2021-01-01 RX ADMIN — SODIUM CHLORIDE 500 ML: 9 INJECTION, SOLUTION INTRAVENOUS at 00:36

## 2021-01-01 RX ADMIN — FUROSEMIDE 40 MG: 10 INJECTION, SOLUTION INTRAMUSCULAR; INTRAVENOUS at 07:06

## 2021-01-01 RX ADMIN — IPRATROPIUM BROMIDE AND ALBUTEROL SULFATE 3 ML: 2.5; .5 SOLUTION RESPIRATORY (INHALATION) at 15:54

## 2021-01-01 RX ADMIN — POLYETHYLENE GLYCOL 3350 17 G: 17 POWDER, FOR SOLUTION ORAL at 11:48

## 2021-01-01 RX ADMIN — IPRATROPIUM BROMIDE AND ALBUTEROL SULFATE 3 ML: 2.5; .5 SOLUTION RESPIRATORY (INHALATION) at 19:48

## 2021-01-01 RX ADMIN — METRONIDAZOLE 500 MG: 500 INJECTION, SOLUTION INTRAVENOUS at 08:37

## 2021-01-01 RX ADMIN — IPRATROPIUM BROMIDE AND ALBUTEROL SULFATE 3 ML: 2.5; .5 SOLUTION RESPIRATORY (INHALATION) at 22:50

## 2021-01-01 RX ADMIN — HYDROMORPHONE HYDROCHLORIDE 0.5 MG: 5 SOLUTION ORAL at 13:28

## 2021-01-01 RX ADMIN — Medication 1 CAPSULE: at 09:35

## 2021-01-01 RX ADMIN — METOLAZONE 5 MG: 5 TABLET ORAL at 13:25

## 2021-01-01 RX ADMIN — HEPARIN SODIUM 5000 UNITS: 10000 INJECTION, SOLUTION INTRAVENOUS; SUBCUTANEOUS at 05:40

## 2021-01-01 RX ADMIN — SODIUM CHLORIDE: 9 INJECTION, SOLUTION INTRAVENOUS at 17:01

## 2021-01-01 RX ADMIN — MIDODRINE HYDROCHLORIDE 5 MG: 5 TABLET ORAL at 17:45

## 2021-01-01 RX ADMIN — DOCUSATE SODIUM 50 MG AND SENNOSIDES 8.6 MG 1 TABLET: 8.6; 5 TABLET, FILM COATED ORAL at 08:38

## 2021-01-01 RX ADMIN — FUROSEMIDE 40 MG: 10 INJECTION, SOLUTION INTRAMUSCULAR; INTRAVENOUS at 05:09

## 2021-01-01 RX ADMIN — METHYLPREDNISOLONE SODIUM SUCCINATE 40 MG: 40 INJECTION, POWDER, FOR SOLUTION INTRAMUSCULAR; INTRAVENOUS at 09:53

## 2021-01-01 RX ADMIN — Medication 3.75 MG: at 20:42

## 2021-01-01 RX ADMIN — FUROSEMIDE 40 MG: 10 INJECTION, SOLUTION INTRAMUSCULAR; INTRAVENOUS at 17:01

## 2021-01-01 RX ADMIN — METHYLPREDNISOLONE SODIUM SUCCINATE 40 MG: 40 INJECTION, POWDER, FOR SOLUTION INTRAMUSCULAR; INTRAVENOUS at 18:22

## 2021-01-01 RX ADMIN — POLYETHYLENE GLYCOL 3350 17 G: 17 POWDER, FOR SOLUTION ORAL at 09:32

## 2021-01-01 RX ADMIN — THERA TABS 1 TABLET: TAB at 09:32

## 2021-01-01 RX ADMIN — Medication 1 CAPSULE: at 20:38

## 2021-01-01 RX ADMIN — HYDROXYUREA 500 MG: 500 CAPSULE ORAL at 08:08

## 2021-01-01 RX ADMIN — IPRATROPIUM BROMIDE AND ALBUTEROL SULFATE 3 ML: 2.5; .5 SOLUTION RESPIRATORY (INHALATION) at 07:38

## 2021-01-01 RX ADMIN — SODIUM CHLORIDE: 9 INJECTION, SOLUTION INTRAVENOUS at 14:58

## 2021-01-01 RX ADMIN — SODIUM CHLORIDE: 9 INJECTION, SOLUTION INTRAVENOUS at 01:18

## 2021-01-01 RX ADMIN — Medication 1 CAPSULE: at 09:34

## 2021-01-01 RX ADMIN — PANTOPRAZOLE SODIUM 40 MG: 20 TABLET, DELAYED RELEASE ORAL at 09:54

## 2021-01-01 RX ADMIN — IPRATROPIUM BROMIDE AND ALBUTEROL SULFATE 3 ML: 2.5; .5 SOLUTION RESPIRATORY (INHALATION) at 07:44

## 2021-01-01 RX ADMIN — PROPOFOL 100 MCG/KG/MIN: 10 INJECTION, EMULSION INTRAVENOUS at 10:26

## 2021-01-01 RX ADMIN — DICLOFENAC 2 G: 10 GEL TOPICAL at 11:09

## 2021-01-01 RX ADMIN — IPRATROPIUM BROMIDE AND ALBUTEROL SULFATE 3 ML: 2.5; .5 SOLUTION RESPIRATORY (INHALATION) at 17:00

## 2021-01-01 RX ADMIN — HYDROXYUREA 1000 MG: 500 CAPSULE ORAL at 09:29

## 2021-01-01 RX ADMIN — HYDROXYUREA 1000 MG: 500 CAPSULE ORAL at 09:31

## 2021-01-01 RX ADMIN — HYDROXYUREA 500 MG: 500 CAPSULE ORAL at 10:55

## 2021-01-01 RX ADMIN — Medication 3.75 MG: at 21:49

## 2021-01-01 RX ADMIN — HEPARIN SODIUM 5000 UNITS: 10000 INJECTION, SOLUTION INTRAVENOUS; SUBCUTANEOUS at 17:12

## 2021-01-01 RX ADMIN — FUROSEMIDE 40 MG: 10 INJECTION, SOLUTION INTRAMUSCULAR; INTRAVENOUS at 18:23

## 2021-01-01 RX ADMIN — POLYETHYLENE GLYCOL 3350 17 G: 17 POWDER, FOR SOLUTION ORAL at 09:54

## 2021-01-01 RX ADMIN — HEPARIN SODIUM 5000 UNITS: 10000 INJECTION, SOLUTION INTRAVENOUS; SUBCUTANEOUS at 06:39

## 2021-01-01 RX ADMIN — ACETAMINOPHEN 650 MG: 325 TABLET ORAL at 20:57

## 2021-01-01 RX ADMIN — METHYLPREDNISOLONE SODIUM SUCCINATE 40 MG: 40 INJECTION, POWDER, FOR SOLUTION INTRAMUSCULAR; INTRAVENOUS at 01:40

## 2021-01-01 RX ADMIN — IPRATROPIUM BROMIDE AND ALBUTEROL SULFATE 3 ML: 2.5; .5 SOLUTION RESPIRATORY (INHALATION) at 11:55

## 2021-01-01 RX ADMIN — METRONIDAZOLE 500 MG: 500 INJECTION, SOLUTION INTRAVENOUS at 20:43

## 2021-01-01 RX ADMIN — Medication 3.75 MG: at 21:55

## 2021-01-01 RX ADMIN — IPRATROPIUM BROMIDE AND ALBUTEROL SULFATE 3 ML: 2.5; .5 SOLUTION RESPIRATORY (INHALATION) at 11:22

## 2021-01-01 RX ADMIN — HYDROMORPHONE HYDROCHLORIDE 0.5 MG: 5 SOLUTION ORAL at 11:10

## 2021-01-01 RX ADMIN — IPRATROPIUM BROMIDE AND ALBUTEROL SULFATE 3 ML: 2.5; .5 SOLUTION RESPIRATORY (INHALATION) at 19:47

## 2021-01-01 RX ADMIN — HEPARIN SODIUM 5000 UNITS: 10000 INJECTION, SOLUTION INTRAVENOUS; SUBCUTANEOUS at 05:33

## 2021-01-01 RX ADMIN — SODIUM CHLORIDE, POTASSIUM CHLORIDE, SODIUM LACTATE AND CALCIUM CHLORIDE: 600; 310; 30; 20 INJECTION, SOLUTION INTRAVENOUS at 09:30

## 2021-01-01 RX ADMIN — HYDROMORPHONE HYDROCHLORIDE 0.5 MG: 5 SOLUTION ORAL at 00:17

## 2021-01-01 RX ADMIN — HEPARIN SODIUM 5000 UNITS: 10000 INJECTION, SOLUTION INTRAVENOUS; SUBCUTANEOUS at 17:44

## 2021-01-01 RX ADMIN — LEVOFLOXACIN 250 MG: 5 INJECTION, SOLUTION INTRAVENOUS at 15:44

## 2021-01-01 RX ADMIN — CIPROFLOXACIN 400 MG: 2 INJECTION, SOLUTION INTRAVENOUS at 22:51

## 2021-01-01 RX ADMIN — LIDOCAINE HYDROCHLORIDE 40 MG: 20 INJECTION, SOLUTION INFILTRATION; PERINEURAL at 10:26

## 2021-01-01 RX ADMIN — IPRATROPIUM BROMIDE AND ALBUTEROL SULFATE 3 ML: 2.5; .5 SOLUTION RESPIRATORY (INHALATION) at 16:17

## 2021-01-01 RX ADMIN — SODIUM CHLORIDE, SODIUM LACTATE, POTASSIUM CHLORIDE, CALCIUM CHLORIDE AND DEXTROSE MONOHYDRATE: 5; 600; 310; 30; 20 INJECTION, SOLUTION INTRAVENOUS at 06:27

## 2021-01-01 RX ADMIN — ACETAMINOPHEN 650 MG: 325 TABLET ORAL at 16:43

## 2021-01-01 RX ADMIN — HYDROXYUREA 1000 MG: 500 CAPSULE ORAL at 14:28

## 2021-01-01 RX ADMIN — HEPARIN SODIUM 5000 UNITS: 10000 INJECTION, SOLUTION INTRAVENOUS; SUBCUTANEOUS at 18:50

## 2021-01-01 RX ADMIN — FUROSEMIDE 40 MG: 10 INJECTION, SOLUTION INTRAMUSCULAR; INTRAVENOUS at 17:39

## 2021-01-01 RX ADMIN — IPRATROPIUM BROMIDE AND ALBUTEROL SULFATE 3 ML: 2.5; .5 SOLUTION RESPIRATORY (INHALATION) at 13:00

## 2021-01-01 RX ADMIN — FUROSEMIDE 40 MG: 10 INJECTION, SOLUTION INTRAMUSCULAR; INTRAVENOUS at 01:40

## 2021-01-01 RX ADMIN — Medication 1 CAPSULE: at 21:56

## 2021-01-01 RX ADMIN — LEVOFLOXACIN 500 MG: 5 INJECTION, SOLUTION INTRAVENOUS at 18:22

## 2021-01-01 RX ADMIN — LEVOFLOXACIN 250 MG: 5 INJECTION, SOLUTION INTRAVENOUS at 17:40

## 2021-01-01 RX ADMIN — Medication 3.75 MG: at 23:05

## 2021-01-01 RX ADMIN — MELATONIN TAB 3 MG 3 MG: 3 TAB at 21:02

## 2021-01-01 RX ADMIN — FUROSEMIDE 40 MG: 10 INJECTION, SOLUTION INTRAMUSCULAR; INTRAVENOUS at 09:35

## 2021-01-01 RX ADMIN — POLYETHYLENE GLYCOL 3350 17 G: 17 POWDER, FOR SOLUTION ORAL at 09:36

## 2021-01-01 RX ADMIN — THERA TABS 1 TABLET: TAB at 17:39

## 2021-01-01 RX ADMIN — Medication 1 CAPSULE: at 08:08

## 2021-01-01 RX ADMIN — HYDROXYUREA 500 MG: 500 CAPSULE ORAL at 09:55

## 2021-01-01 RX ADMIN — HYDROMORPHONE HYDROCHLORIDE 0.5 MG: 5 SOLUTION ORAL at 04:30

## 2021-01-01 RX ADMIN — SODIUM CHLORIDE: 9 INJECTION, SOLUTION INTRAVENOUS at 05:35

## 2021-01-01 RX ADMIN — HEPARIN SODIUM 5000 UNITS: 10000 INJECTION, SOLUTION INTRAVENOUS; SUBCUTANEOUS at 17:14

## 2021-01-01 RX ADMIN — SODIUM CHLORIDE: 9 INJECTION, SOLUTION INTRAVENOUS at 12:33

## 2021-01-01 RX ADMIN — SODIUM PHOSPHATE, DIBASIC AND SODIUM PHOSPHATE, MONOBASIC 1 ENEMA: 7; 19 ENEMA RECTAL at 09:30

## 2021-01-01 RX ADMIN — IOPAMIDOL 75 ML: 755 INJECTION, SOLUTION INTRAVENOUS at 00:13

## 2021-01-01 RX ADMIN — METRONIDAZOLE 500 MG: 500 INJECTION, SOLUTION INTRAVENOUS at 21:33

## 2021-01-01 RX ADMIN — HEPARIN SODIUM 5000 UNITS: 10000 INJECTION, SOLUTION INTRAVENOUS; SUBCUTANEOUS at 06:41

## 2021-01-01 RX ADMIN — Medication 3.75 MG: at 20:38

## 2021-01-01 RX ADMIN — GLYCERIN 1 SUPPOSITORY: 2 SUPPOSITORY RECTAL at 17:11

## 2021-01-01 RX ADMIN — METRONIDAZOLE 500 MG: 500 INJECTION, SOLUTION INTRAVENOUS at 09:40

## 2021-01-01 RX ADMIN — DOXYCYCLINE 100 MG: 100 INJECTION, POWDER, LYOPHILIZED, FOR SOLUTION INTRAVENOUS at 02:35

## 2021-01-01 RX ADMIN — HYDROMORPHONE HYDROCHLORIDE 0.5 MG: 5 SOLUTION ORAL at 16:00

## 2021-01-01 RX ADMIN — HEPARIN SODIUM 5000 UNITS: 10000 INJECTION, SOLUTION INTRAVENOUS; SUBCUTANEOUS at 18:48

## 2021-01-01 RX ADMIN — SODIUM CHLORIDE, SODIUM LACTATE, POTASSIUM CHLORIDE, CALCIUM CHLORIDE AND DEXTROSE MONOHYDRATE: 5; 600; 310; 30; 20 INJECTION, SOLUTION INTRAVENOUS at 17:39

## 2021-01-01 RX ADMIN — IPRATROPIUM BROMIDE AND ALBUTEROL SULFATE 3 ML: 2.5; .5 SOLUTION RESPIRATORY (INHALATION) at 20:09

## 2021-01-01 RX ADMIN — ACETAMINOPHEN 650 MG: 325 TABLET ORAL at 10:48

## 2021-01-01 RX ADMIN — PIPERACILLIN SODIUM AND TAZOBACTAM SODIUM 3.38 G: 3; .375 INJECTION, POWDER, LYOPHILIZED, FOR SOLUTION INTRAVENOUS at 06:16

## 2021-01-01 RX ADMIN — Medication 1 CAPSULE: at 09:32

## 2021-01-01 RX ADMIN — PREDNISONE 40 MG: 20 TABLET ORAL at 09:29

## 2021-01-01 RX ADMIN — HEPARIN SODIUM 5000 UNITS: 10000 INJECTION, SOLUTION INTRAVENOUS; SUBCUTANEOUS at 05:09

## 2021-01-01 RX ADMIN — HYDROMORPHONE HYDROCHLORIDE 0.5 MG: 5 SOLUTION ORAL at 08:05

## 2021-01-01 RX ADMIN — METRONIDAZOLE 500 MG: 500 INJECTION, SOLUTION INTRAVENOUS at 21:49

## 2021-01-01 RX ADMIN — DOCUSATE SODIUM 50 MG AND SENNOSIDES 8.6 MG 1 TABLET: 8.6; 5 TABLET, FILM COATED ORAL at 09:55

## 2021-01-01 RX ADMIN — HEPARIN SODIUM 5000 UNITS: 10000 INJECTION, SOLUTION INTRAVENOUS; SUBCUTANEOUS at 06:16

## 2021-01-01 RX ADMIN — HEPARIN SODIUM 5000 UNITS: 10000 INJECTION, SOLUTION INTRAVENOUS; SUBCUTANEOUS at 17:54

## 2021-01-01 RX ADMIN — HEPARIN SODIUM 5000 UNITS: 10000 INJECTION, SOLUTION INTRAVENOUS; SUBCUTANEOUS at 17:01

## 2021-01-01 RX ADMIN — PANTOPRAZOLE SODIUM 40 MG: 20 TABLET, DELAYED RELEASE ORAL at 06:31

## 2021-01-01 RX ADMIN — SODIUM CHLORIDE: 9 INJECTION, SOLUTION INTRAVENOUS at 16:40

## 2021-01-01 RX ADMIN — HYDROXYUREA 1000 MG: 500 CAPSULE ORAL at 09:27

## 2021-01-01 RX ADMIN — METRONIDAZOLE 500 MG: 500 INJECTION, SOLUTION INTRAVENOUS at 20:40

## 2021-01-01 RX ADMIN — IPRATROPIUM BROMIDE AND ALBUTEROL SULFATE 3 ML: 2.5; .5 SOLUTION RESPIRATORY (INHALATION) at 16:23

## 2021-01-01 RX ADMIN — DOCUSATE SODIUM 50 MG AND SENNOSIDES 8.6 MG 1 TABLET: 8.6; 5 TABLET, FILM COATED ORAL at 09:37

## 2021-01-01 RX ADMIN — LEVOFLOXACIN 500 MG: 5 INJECTION, SOLUTION INTRAVENOUS at 17:11

## 2021-01-01 RX ADMIN — HEPARIN SODIUM 5000 UNITS: 10000 INJECTION, SOLUTION INTRAVENOUS; SUBCUTANEOUS at 17:29

## 2021-01-01 RX ADMIN — PREDNISONE 40 MG: 20 TABLET ORAL at 17:54

## 2021-01-01 RX ADMIN — DOCUSATE SODIUM 50 MG AND SENNOSIDES 8.6 MG 1 TABLET: 8.6; 5 TABLET, FILM COATED ORAL at 17:20

## 2021-01-01 RX ADMIN — METHYLPREDNISOLONE SODIUM SUCCINATE 40 MG: 40 INJECTION, POWDER, FOR SOLUTION INTRAMUSCULAR; INTRAVENOUS at 09:36

## 2021-01-01 RX ADMIN — FUROSEMIDE 40 MG: 10 INJECTION, SOLUTION INTRAMUSCULAR; INTRAVENOUS at 20:37

## 2021-01-01 RX ADMIN — PANTOPRAZOLE SODIUM 40 MG: 20 TABLET, DELAYED RELEASE ORAL at 06:44

## 2021-01-01 RX ADMIN — Medication 1 CAPSULE: at 20:41

## 2021-01-01 RX ADMIN — FUROSEMIDE 40 MG: 10 INJECTION, SOLUTION INTRAMUSCULAR; INTRAVENOUS at 13:25

## 2021-01-01 ASSESSMENT — ACTIVITIES OF DAILY LIVING (ADL)
ADLS_ACUITY_SCORE: 33
ADLS_ACUITY_SCORE: 28
ADLS_ACUITY_SCORE: 32
ADLS_ACUITY_SCORE: 31
ADLS_ACUITY_SCORE: 30
ADLS_ACUITY_SCORE: 28
ADLS_ACUITY_SCORE: 33
ADLS_ACUITY_SCORE: 34
ADLS_ACUITY_SCORE: 30
ADLS_ACUITY_SCORE: 30
ADLS_ACUITY_SCORE: 34
ADLS_ACUITY_SCORE: 40
ADLS_ACUITY_SCORE: 29
ADLS_ACUITY_SCORE: 34
ADLS_ACUITY_SCORE: 32
ADLS_ACUITY_SCORE: 36
ADLS_ACUITY_SCORE: 32
ADLS_ACUITY_SCORE: 30
ADLS_ACUITY_SCORE: 32
ADLS_ACUITY_SCORE: 32
ADLS_ACUITY_SCORE: 28
ADLS_ACUITY_SCORE: 29
ADLS_ACUITY_SCORE: 30
ADLS_ACUITY_SCORE: 31
ADLS_ACUITY_SCORE: 36
ADLS_ACUITY_SCORE: 38
ADLS_ACUITY_SCORE: 28
ADLS_ACUITY_SCORE: 24
ADLS_ACUITY_SCORE: 31
ADLS_ACUITY_SCORE: 31
ADLS_ACUITY_SCORE: 28
ADLS_ACUITY_SCORE: 30
ADLS_ACUITY_SCORE: 36
ADLS_ACUITY_SCORE: 31
ADLS_ACUITY_SCORE: 21
ADLS_ACUITY_SCORE: 30
ADLS_ACUITY_SCORE: 28
ADLS_ACUITY_SCORE: 34
ADLS_ACUITY_SCORE: 26
ADLS_ACUITY_SCORE: 30
ADLS_ACUITY_SCORE: 38
ADLS_ACUITY_SCORE: 36
ADLS_ACUITY_SCORE: 32
ADLS_ACUITY_SCORE: 32
ADLS_ACUITY_SCORE: 30
ADLS_ACUITY_SCORE: 34
ADLS_ACUITY_SCORE: 26
ADLS_ACUITY_SCORE: 28
ADLS_ACUITY_SCORE: 32
ADLS_ACUITY_SCORE: 38
ADLS_ACUITY_SCORE: 24
ADLS_ACUITY_SCORE: 34
ADLS_ACUITY_SCORE: 28
ADLS_ACUITY_SCORE: 32
ADLS_ACUITY_SCORE: 34
ADLS_ACUITY_SCORE: 36
ADLS_ACUITY_SCORE: 40
ADLS_ACUITY_SCORE: 30
ADLS_ACUITY_SCORE: 28
ADLS_ACUITY_SCORE: 26
ADLS_ACUITY_SCORE: 38
ADLS_ACUITY_SCORE: 31
ADLS_ACUITY_SCORE: 32
ADLS_ACUITY_SCORE: 28
ADLS_ACUITY_SCORE: 41
ADLS_ACUITY_SCORE: 26
ADLS_ACUITY_SCORE: 28
ADLS_ACUITY_SCORE: 40
ADLS_ACUITY_SCORE: 30
ADLS_ACUITY_SCORE: 21
ADLS_ACUITY_SCORE: 38
ADLS_ACUITY_SCORE: 32
ADLS_ACUITY_SCORE: 34
ADLS_ACUITY_SCORE: 28
ADLS_ACUITY_SCORE: 33
ADLS_ACUITY_SCORE: 32
ADLS_ACUITY_SCORE: 32
ADLS_ACUITY_SCORE: 28
ADLS_ACUITY_SCORE: 26
ADLS_ACUITY_SCORE: 31
ADLS_ACUITY_SCORE: 36
ADLS_ACUITY_SCORE: 28
ADLS_ACUITY_SCORE: 36
ADLS_ACUITY_SCORE: 32
ADLS_ACUITY_SCORE: 28
ADLS_ACUITY_SCORE: 29
ADLS_ACUITY_SCORE: 32
ADLS_ACUITY_SCORE: 33
ADLS_ACUITY_SCORE: 32
ADLS_ACUITY_SCORE: 32
ADLS_ACUITY_SCORE: 29
ADLS_ACUITY_SCORE: 30
ADLS_ACUITY_SCORE: 34
ADLS_ACUITY_SCORE: 32
ADLS_ACUITY_SCORE: 30
ADLS_ACUITY_SCORE: 32
ADLS_ACUITY_SCORE: 34
ADLS_ACUITY_SCORE: 32
DEPENDENT_IADLS:: CLEANING;COOKING;LAUNDRY;SHOPPING;MEAL PREPARATION;MEDICATION MANAGEMENT;MONEY MANAGEMENT;TRANSPORTATION;INCONTINENCE
ADLS_ACUITY_SCORE: 29
ADLS_ACUITY_SCORE: 36
ADLS_ACUITY_SCORE: 32
ADLS_ACUITY_SCORE: 36
ADLS_ACUITY_SCORE: 30
ADLS_ACUITY_SCORE: 24
ADLS_ACUITY_SCORE: 24
ADLS_ACUITY_SCORE: 29
ADLS_ACUITY_SCORE: 30
ADLS_ACUITY_SCORE: 40
ADLS_ACUITY_SCORE: 31
ADLS_ACUITY_SCORE: 33
ADLS_ACUITY_SCORE: 36
ADLS_ACUITY_SCORE: 29
ADLS_ACUITY_SCORE: 36
ADLS_ACUITY_SCORE: 31
ADLS_ACUITY_SCORE: 36
ADLS_ACUITY_SCORE: 28
ADLS_ACUITY_SCORE: 34
ADLS_ACUITY_SCORE: 28
ADLS_ACUITY_SCORE: 29
ADLS_ACUITY_SCORE: 32
ADLS_ACUITY_SCORE: 34
ADLS_ACUITY_SCORE: 28
ADLS_ACUITY_SCORE: 31
ADLS_ACUITY_SCORE: 24
ADLS_ACUITY_SCORE: 34
ADLS_ACUITY_SCORE: 28
ADLS_ACUITY_SCORE: 30
ADLS_ACUITY_SCORE: 40
ADLS_ACUITY_SCORE: 28
ADLS_ACUITY_SCORE: 28
ADLS_ACUITY_SCORE: 21
ADLS_ACUITY_SCORE: 28
ADLS_ACUITY_SCORE: 28
ADLS_ACUITY_SCORE: 32
ADLS_ACUITY_SCORE: 28
ADLS_ACUITY_SCORE: 36
ADLS_ACUITY_SCORE: 30
ADLS_ACUITY_SCORE: 41
ADLS_ACUITY_SCORE: 21
ADLS_ACUITY_SCORE: 26
ADLS_ACUITY_SCORE: 31
ADLS_ACUITY_SCORE: 34
ADLS_ACUITY_SCORE: 31
ADLS_ACUITY_SCORE: 38
ADLS_ACUITY_SCORE: 37
ADLS_ACUITY_SCORE: 31
ADLS_ACUITY_SCORE: 28
ADLS_ACUITY_SCORE: 40
ADLS_ACUITY_SCORE: 28
ADLS_ACUITY_SCORE: 26
ADLS_ACUITY_SCORE: 29
ADLS_ACUITY_SCORE: 38
ADLS_ACUITY_SCORE: 30
ADLS_ACUITY_SCORE: 34
ADLS_ACUITY_SCORE: 34
ADLS_ACUITY_SCORE: 28
ADLS_ACUITY_SCORE: 26
ADLS_ACUITY_SCORE: 34
ADLS_ACUITY_SCORE: 30
ADLS_ACUITY_SCORE: 36
ADLS_ACUITY_SCORE: 34
ADLS_ACUITY_SCORE: 29
ADLS_ACUITY_SCORE: 32
ADLS_ACUITY_SCORE: 28
ADLS_ACUITY_SCORE: 30
ADLS_ACUITY_SCORE: 38
ADLS_ACUITY_SCORE: 32
ADLS_ACUITY_SCORE: 34
ADLS_ACUITY_SCORE: 29
ADLS_ACUITY_SCORE: 31
ADLS_ACUITY_SCORE: 28
ADLS_ACUITY_SCORE: 32
ADLS_ACUITY_SCORE: 28
ADLS_ACUITY_SCORE: 30
ADLS_ACUITY_SCORE: 36
ADLS_ACUITY_SCORE: 34
ADLS_ACUITY_SCORE: 28
ADLS_ACUITY_SCORE: 32
ADLS_ACUITY_SCORE: 38
ADLS_ACUITY_SCORE: 31
ADLS_ACUITY_SCORE: 28
ADLS_ACUITY_SCORE: 36
ADLS_ACUITY_SCORE: 36
ADLS_ACUITY_SCORE: 30
ADLS_ACUITY_SCORE: 34
ADLS_ACUITY_SCORE: 36
ADLS_ACUITY_SCORE: 26
ADLS_ACUITY_SCORE: 31
ADLS_ACUITY_SCORE: 28
ADLS_ACUITY_SCORE: 42
ADLS_ACUITY_SCORE: 28
ADLS_ACUITY_SCORE: 31
ADLS_ACUITY_SCORE: 30
ADLS_ACUITY_SCORE: 30
ADLS_ACUITY_SCORE: 34
ADLS_ACUITY_SCORE: 31
ADLS_ACUITY_SCORE: 29
ADLS_ACUITY_SCORE: 30
ADLS_ACUITY_SCORE: 31
ADLS_ACUITY_SCORE: 30
ADLS_ACUITY_SCORE: 37
ADLS_ACUITY_SCORE: 30
ADLS_ACUITY_SCORE: 36
ADLS_ACUITY_SCORE: 28
ADLS_ACUITY_SCORE: 34
ADLS_ACUITY_SCORE: 40
ADLS_ACUITY_SCORE: 31
ADLS_ACUITY_SCORE: 36
ADLS_ACUITY_SCORE: 28
ADLS_ACUITY_SCORE: 34
ADLS_ACUITY_SCORE: 30
ADLS_ACUITY_SCORE: 32
ADLS_ACUITY_SCORE: 36
ADLS_ACUITY_SCORE: 28
ADLS_ACUITY_SCORE: 30
ADLS_ACUITY_SCORE: 32
ADLS_ACUITY_SCORE: 33
ADLS_ACUITY_SCORE: 31
ADLS_ACUITY_SCORE: 21
ADLS_ACUITY_SCORE: 31
ADLS_ACUITY_SCORE: 41
ADLS_ACUITY_SCORE: 28
ADLS_ACUITY_SCORE: 38
ADLS_ACUITY_SCORE: 26
ADLS_ACUITY_SCORE: 36
ADLS_ACUITY_SCORE: 38
ADLS_ACUITY_SCORE: 34
ADLS_ACUITY_SCORE: 34
ADLS_ACUITY_SCORE: 29
ADLS_ACUITY_SCORE: 32
ADLS_ACUITY_SCORE: 42
ADLS_ACUITY_SCORE: 28
ADLS_ACUITY_SCORE: 42
ADLS_ACUITY_SCORE: 40

## 2021-01-01 ASSESSMENT — ENCOUNTER SYMPTOMS: DYSRHYTHMIAS: 1

## 2021-01-01 ASSESSMENT — COPD QUESTIONNAIRES
CAT_SEVERITY: SEVERE
COPD: 1

## 2021-01-01 ASSESSMENT — MIFFLIN-ST. JEOR
SCORE: 1635.55
SCORE: 1651.88
SCORE: 1649.16

## 2021-05-19 NOTE — TELEPHONE ENCOUNTER
Iliana calling w/ patient. They have a question about home PT. Iliana says patient was just recently discharge from [facility], first floor, [address].  Patient's Physical Therapist, Mariama, told them that patient would benefit for home services; however, there is nothing in the home discharge note that indicated a referral was done.     Spoke to [facility] medical staff who says they have talked to her several times and charge nurse will have to review his chart to see what information she can find and call her back this evening; other option is for patient to follow-up with PCP who can do the home care referral.        Additional Information    Information only question and nurse able to answer    Negative: Nursing judgment    Negative: Nursing judgment    Negative: Nursing judgment    Negative: Nursing judgment    Protocols used: INFORMATION ONLY CALL - NO TRIAGE-A-[place]

## 2021-05-31 NOTE — PROGRESS NOTES
12:09 Obtained intake and reviewed documentation 12:24 Called [employer] and spoke with Shannan. Clarified if pt. has received o2 with [facility] per intake form and reported that was in error. Informed need of updated F2F and smart phrase education on documentation needs. 12:40 Smart phrase obtained, but signed by RN. Contacted Shannan and informed need to have document signed by MD. 12:50 Offered choice to pt. and pt. reporting currently receiving o2 with [facility]. Requested to call son, Fabian, to further discuss o2 needs. Contacted Fabian and confirmed actively receiving o2 services through [facility]. Educated that [facility] will need to continue to service and provide equipment appropriate for pt. needs. Receptive and explained that [facility] staff will assist with this. Contacted Shannan back and explained pt. currently being serviced by [facility] and need to continue to process though [facility] provider. Shannan receptive and will follow through assisting pt. by contacting [facility]. - Keesha PIERRE Liaison 8/13/19 entered by Josie COLUNGA

## 2021-05-31 NOTE — PROGRESS NOTES
[facility]    NAME:  Efe Huertas             :  1928  MRN: [id number]  CODE STATUS:  FULL CODE    FACILITY:  [facility] [[id number]]       ROOM:   216    CHIEF COMPLAINT/REASON FOR VISIT:  Chief Complaint   Patient presents with     Problem Visit     CHF     HISTORY OF PRESENT ILLNESS: Efe Huertas is a 89 y.o. male with past medical history of A fib, ICD  presented with mechanical fall and left hip pain.   Imaging including CT head, x-ray of chest, elbow left, left knee, pelvis unremarkable.  Cardiology evaluated patient, thought it could be related to short AV interval, does not appear in heart failure or any significant arrhythmias noted.  Pacemaker was adjusted.   PT/OT evaluated patient, they recommended TCU.  Initially, patient refused TCU and was going to discharge to home with home care.  He later agreed to discharge to TCU.  He is on 3L via NC of home O2  with activity related to his underlying CHF.    Today, patient is seen at the bedside.  He has a skin tear on his left forearm that is healing. Denies any pain.  Normotensive.  He continues on 2L of oxygen via NC.  Reports being on oxygen therapy at home during the night.  It is unclear if he is compliant with this at home.  Denies any episodes of dizziness.  He is on Eliquis for Atrial Fibrillation.    No past medical history on file.     No past surgical history on file.     No family history on file.     Social History     Socioeconomic History     Marital status:      Spouse name: Not on file     Number of children: Not on file     Years of education: Not on file     Highest education level: Not on file   Occupational History     Not on file   Social Needs     Financial resource strain: Not on file     Food insecurity:     Worry: Not on file     Inability: Not on file     Transportation needs:     Medical: Not on file     Non-medical: Not on file   Tobacco Use     Smoking status: Never Smoker      Smokeless tobacco: Never Used   Substance and Sexual Activity     Alcohol use: Yes     Frequency: Monthly or less     Drinks per session: 1 or 2     Binge frequency: Never     Drug use: Never     Sexual activity: Not on file   Lifestyle     Physical activity:     Days per week: Not on file     Minutes per session: Not on file     Stress: Not on file   Relationships     Social connections:     Talks on phone: Not on file     Gets together: Not on file     Attends Yarsani service: Not on file     Active member of club or organization: Not on file     Attends meetings of clubs or organizations: Not on file     Relationship status: Not on file     Intimate partner violence:     Fear of current or ex partner: Not on file     Emotionally abused: Not on file     Physically abused: Not on file     Forced sexual activity: Not on file   Other Topics Concern     Not on file   Social History Narrative     Not on file     No Known Allergies     Current Outpatient Medications   Medication Sig Dispense Refill     acetaminophen (TYLENOL) 500 MG tablet Take 500-1,000 mg by mouth every 6 (six) hours as needed for pain.       albuterol (PROAIR HFA;PROVENTIL HFA;VENTOLIN HFA) 90 mcg/actuation inhaler Inhale 2 puffs every 6 (six) hours as needed for wheezing.       apixaban (ELIQUIS) 5 mg Tab tablet Take 5 mg by mouth 2 (two) times a day.       carvedilol (COREG) 12.5 MG tablet Take 1 tablet (12.5 mg total) by mouth 2 (two) times a day with meals. 60 tablet 0     docusate sodium (COLACE) 100 MG capsule Take 100 mg by mouth 2 (two) times a day as needed for constipation.       hydroxyurea (HYDREA) 500 mg capsule Take 500 mg by mouth daily. Higher dose on Tue and Thu       hydroxyurea (HYDREA) 500 mg capsule Take 500 mg by mouth 2 (two) times a week. Additional 500 mg on Tuesday and Thursday for total dose of 1000 mg       mirtazapine (REMERON) 7.5 MG tablet Take 7.5 mg by mouth at bedtime.       psyllium (METAMUCIL) 3.4 gram packet  Take 1 packet by mouth daily as needed.       No current facility-administered medications for this visit.      REVIEW OF SYSTEMS:    Currently, no fever, chills, or rigors. Does not have any visual or hearing problems. Denies any chest pain, headaches, palpitations, lightheadedness, dizziness, shortness of breath, or cough. Appetite is good. Denies any GERD symptoms. Denies any difficulty with swallowing, nausea, or vomiting.  Denies any abdominal pain, diarrhea or constipation. Denies any urinary symptoms. No insomnia. No active bleeding. No rash.     PHYSICAL EXAMINATION:  Vitals:    09/03/19 2152   BP: 147/73   Pulse: 76   Resp: 18   Temp: 97.5  F (36.4  C)   SpO2: 94%   Weight: (!) 227 lb 1.6 oz (103 kg)       GENERAL: Awake, Alert, oriented x3, not in any form of acute distress, answers questions appropriately, follows simple commands, conversant  HEENT: Head is normocephalic with normal hair distribution. No evidence of trauma. Ears: No acute purulent discharge. Eyes: Conjunctivae pink with no scleral jaundice. Nose: Normal mucosa and septum. NECK: Supple with no cervical or supraclavicular lymphadenopathy. Trachea is midline.   CHEST: No tenderness or deformity, no crepitus  LUNG: Clear to auscultation with good poor chest expansion. There are no crackles or wheezes, normal AP diameter.  BACK: No kyphosis of the thoracic spine. Symmetric, no curvature, ROM normal, no CVA tenderness, no spinal tenderness   CVS: There is good S1  S2, there are no murmurs, rubs, gallops, or heaves, rhythm is regular,  2+ pulses symmetric in all extremities.  ABDOMEN: Globular and soft, nontender to palpation, non distended, no masses, no organomegaly, good bowel sounds, no rebound or guarding, no peritoneal signs.   EXTREMITIES:  Full range of motion on both upper and lower extremities, there is no tenderness to palpation, no pedal edema, no cyanosis or clubbing, no calf tenderness.  Pulses equal in all extremities, normal cap  refill, no joint swelling.  SKIN: Warm and dry, no erythema noted.  Skin color, texture, no rashes or lesions.  NEUROLOGICAL: The patient is oriented to person, place and time. Strength and sensation are grossly intact. Face is symmetric.    LABS:      Lab Results   Component Value Date    WBC 7.0 08/12/2019    HGB 13.0 (L) 08/12/2019    HCT 44.5 08/12/2019    MCV 85 08/12/2019     08/12/2019     Results for orders placed or performed during the hospital encounter of 08/12/19   Basic metabolic panel   Result Value Ref Range    Sodium 141 136 - 145 mmol/L    Potassium 4.6 3.5 - 5.0 mmol/L    Chloride 101 98 - 107 mmol/L    CO2 31 22 - 31 mmol/L    Anion Gap, Calculation 9 5 - 18 mmol/L    Glucose 97 70 - 125 mg/dL    Calcium 9.2 8.5 - 10.5 mg/dL    BUN 21 8 - 28 mg/dL    Creatinine 0.87 0.70 - 1.30 mg/dL    GFR MDRD Af Amer >60 >60 mL/min/1.73m2    GFR MDRD Non Af Amer >60 >60 mL/min/1.73m2     ASSESSMENT/PLAN:    1. Congestive heart failure, unspecified HF chronicity, unspecified heart failure type (H)  - No overt signs or symptoms of decompensation, continue Coreg    2. Paroxysmal atrial fibrillation (H)  - Anticoagulated with Eliquis   3. Chronic obstructive pulmonary disease, unspecified COPD type (H) - Continues  oxygen therapy continuously on 2L via NC as well as Albuterol   4. Dizziness - Stable               Electronically signed by:  Ronen Blackman CNP

## 2021-05-31 NOTE — PROGRESS NOTES
[facility]    Facility:   [facility] [[id number]]   Code Status: FULL CODE        Admission History and Physical   Efe Huertas,  1928, MRN [id number]    PCP: Gabriel Weems MD, [phone]    Date of Service   Code status:  [unfilled]       Extended Emergency Contact Information  Primary Emergency Contact: IAN GA  Home Phone: [phone]  Mobile Phone: [phone]  Relation: Child   needed? No  Secondary Emergency Contact: BRAD HUERTAS  Mobile Phone: [phone]  Relation: Child       Assessment and Plan         1. Recent  Hospitalization  at [facility] on 19 and discharged on 19.  history of A fib, ICD, chronic hypercapnic respiratory failure on oxygen .  He  presented with mechanical fall and left hip pain.   Imaging including CT head, x-ray of chest, elbow left, left knee, pelvis unremarkable.  Cardiology evaluated patient, could be related to short AV interval, did not appear in heart failure or significant arrhythmias noted.  Pacemaker was adjusted.  PT/OT evaluated patient, they recommended TCU. Home O2 evaluation was done and he qualified for 3 L of oxygen with activity related to his underlying CHF  and COPD.    2. Chronic hypercapnic respiratory failure : on oxygen    3. A fib, h/o : on eliquis, coreg 12.5 mg two times a day    4. Polycythemia, h/o : on Hydorxyurea    5. Prostrate cancer h/o           Disposition/Plan :          Patient is a  89 yr old male. He was recently hospitalized at [facility] on 19 and discharged on 19.  history of A fib, ICD, chronic hypercapnic respiratory failure on oxygen .  He  presented with mechanical fall and left hip pain.   Imaging including CT head, x-ray of chest, elbow left, left knee, pelvis unremarkable.  Cardiology evaluated patient, could be related to short AV interval, did not appear in heart failure or significant arrhythmias noted.  Pacemaker was adjusted.  PT/OT evaluated  patient, they recommended TCU. Home O2 evaluation was done and he qualified for 3 L of oxygen with activity related to his underlying CHF  and COPD.       Patient was then subsequently admitted at [facility] for rehab, pt and ot. Patient is medically stable at this time. He denies any significant complaints at this time. He denies any shortness of breath and his respiratory status seems stable. He is on 1.5 L of oxygen at this time.           Chief Complaint: Pre syncope     History of Present Illness         Patient is a  89 yr old male. He was recently hospitalized at [facility] on 8/12/19 and discharged on 8/13/19.  history of A fib, ICD, chronic hypercapnic respiratory failure on oxygen .  He  presented with mechanical fall and left hip pain.   Imaging including CT head, x-ray of chest, elbow left, left knee, pelvis unremarkable.  Cardiology evaluated patient, could be related to short AV interval, did not appear in heart failure or significant arrhythmias noted.  Pacemaker was adjusted.  PT/OT evaluated patient, they recommended TCU. Home O2 evaluation was done and he qualified for 3 L of oxygen with activity related to his underlying CHF  and COPD.       Patient was then subsequently admitted at [facility] for rehab, pt and ot. Patient is medically stable at this time. He denies any significant complaints at this time. He denies any shortness of breath and his respiratory status seems stable. He is on 1.5 L of oxygen at this time.          No h/o fever or chills  No cardiorespiratory symptoms  No abdominal symptoms  No urinary symptoms  No neuro symptoms.       Medical History  Active Ambulatory (Non-Hospital) Problems    Diagnosis     Pre-syncope     Ventricular bigeminy     Dizziness     Obstructive airway disease (H)     Encounter for servicing of implantable cardioverter-defibrillator (ICD) at end of battery life     CHB (complete heart block) (H)     A-fib (H)     Biventricular ICD (implantable  cardioverter-defibrillator) in place     NICM (nonischemic cardiomyopathy) (H)     Colovesical fistula     Congestive heart failure (H)     No past medical history on file.     Surgical History  He  has no past surgical history on file.       Social History  Reviewed, and he  reports that he has never smoked. He has never used smokeless tobacco. He reports that he drinks alcohol. He reports that he does not use drugs.     Allergies  No Known Allergies Family History  Reviewed, and family history is not on file.          Prior to Admission Medications     (Not in a hospital admission)       Review of Systems:  A 12 point comprehensive review of systems was negative except as noted. Physical Exam:      HEENT : no distended veins, no lymphadenopathy, thyroid is normal  LUNGS : b/l air entry present, no significant crackles/wheezing.  ABDOMEN : soft, non-tender, non-distended, BS present.  HEART :  Regular rate & rhythm, S1 & S2 normal, no murmur, clicks/rubs, no ankle edema,  NEURO : conscious, oriented, responds to commands, no obvious focal deficit.  MUSCULOSKELETAL : EXTREMITIES/BACK :  no calf tenderness.  SKIN : no rashes      Vitals:    09/03/19 1055   BP: 140/78   Pulse: 66   Resp: 17   Temp: 97.9  F (36.6  C)   SpO2: 94%                    Pertinent Labs      Lab Results: personally reviewed.   Lab Results   Component Value Date     09/03/2019    K 4.5 09/03/2019    CL 98 09/03/2019    CO2 32 (H) 09/03/2019    BUN 27 09/03/2019    CREATININE 0.90 09/03/2019    CALCIUM 9.0 09/03/2019       Pertinent Radiology      Radiology Results: Personally reviewed image/s  EKG Results:    Advanced Care Planning             Total Time Spent > 45 mins.  Discharge Planning discussed with Patient and Family  Discussed care with Patient for 35  Minutes and greater than 50% of total time spent in coordinating the plan of care.             Electronically signed by: Constantine Bergeron MD

## 2021-05-31 NOTE — TELEPHONE ENCOUNTER
[facility] Nurse Triage Telephone Note      Provider: ANTONI Oliver  Facility: [facility]    Facility Type: TCU    Caller: Odilia   Call Back Number:  [phone]    Allergies: Patient has no known allergies.    Reason for call: BMP results   wts down to 223 today was 226 yesterday, no shortness of breath, newly on lasix 20mg daily d/t edema      Verbal Order/Direction given by Provider: NNO     Provider giving order: ANTONI Oliver    Verbal order given to: Odilia Trujillo RN

## 2021-05-31 NOTE — PROGRESS NOTES
[facility]    NAME:  Efe Huertas             :  1928  MRN: [id number]  CODE STATUS:  FULL CODE    FACILITY:  [facility] [[id number]]       ROOM:   216    CHIEF COMPLAINT/REASON FOR VISIT:  Chief Complaint   Patient presents with     Problem Visit     Fall with left hip pain     HISTORY OF PRESENT ILLNESS: Efe Huertas is a 89 y.o. male with past medical history of A fib, ICD  presented with mechanical fall and left hip pain.   Imaging including CT head, x-ray of chest, elbow left, left knee, pelvis unremarkable.  Cardiology evaluated patient, thought it could be related to short AV interval, does not appear in heart failure or any significant arrhythmias noted.  Pacemaker was adjusted.   PT/OT evaluated patient, they recommended TCU.  Initially, patient refused TCU and was going to discharge to home with home care.  He later agreed to discharge to TCU.  He is on 3L via NC of home O2  with activity related to his underlying CHF.       No past medical history on file.     No past surgical history on file.     No family history on file.     Social History     Socioeconomic History     Marital status:      Spouse name: Not on file     Number of children: Not on file     Years of education: Not on file     Highest education level: Not on file   Occupational History     Not on file   Social Needs     Financial resource strain: Not on file     Food insecurity:     Worry: Not on file     Inability: Not on file     Transportation needs:     Medical: Not on file     Non-medical: Not on file   Tobacco Use     Smoking status: Never Smoker     Smokeless tobacco: Never Used   Substance and Sexual Activity     Alcohol use: Yes     Frequency: Monthly or less     Drinks per session: 1 or 2     Binge frequency: Never     Drug use: Never     Sexual activity: Not on file   Lifestyle     Physical activity:     Days per week: Not on file     Minutes per session: Not on file      Stress: Not on file   Relationships     Social connections:     Talks on phone: Not on file     Gets together: Not on file     Attends [employer]op[employer] service: Not on file     Active member of club or organization: Not on file     Attends meetings of clubs or organizations: Not on file     Relationship status: Not on file     Intimate partner violence:     Fear of current or ex partner: Not on file     Emotionally abused: Not on file     Physically abused: Not on file     Forced sexual activity: Not on file   Other Topics Concern     Not on file   Social History Narrative     Not on file     No Known Allergies     Current Outpatient Medications   Medication Sig Dispense Refill     acetaminophen (TYLENOL) 500 MG tablet Take 500-1,000 mg by mouth every 6 (six) hours as needed for pain.       albuterol (PROAIR HFA;PROVENTIL HFA;VENTOLIN HFA) 90 mcg/actuation inhaler Inhale 2 puffs every 6 (six) hours as needed for wheezing.       apixaban (ELIQUIS) 5 mg Tab tablet Take 5 mg by mouth 2 (two) times a day.       carvedilol (COREG) 12.5 MG tablet Take 1 tablet (12.5 mg total) by mouth 2 (two) times a day with meals. 60 tablet 0     docusate sodium (COLACE) 100 MG capsule Take 100 mg by mouth 2 (two) times a day as needed for constipation.       hydroxyurea (HYDREA) 500 mg capsule Take 500 mg by mouth daily. Higher dose on Tue and Thu       hydroxyurea (HYDREA) 500 mg capsule Take 500 mg by mouth 2 (two) times a week. Additional 500 mg on Tuesday and Thursday for total dose of 1000 mg       mirtazapine (REMERON) 7.5 MG tablet Take 7.5 mg by mouth at bedtime.       psyllium (METAMUCIL) 3.4 gram packet Take 1 packet by mouth daily as needed.       No current facility-administered medications for this visit.      REVIEW OF SYSTEMS:    Currently, no fever, chills, or rigors. Does not have any visual or hearing problems. Denies any chest pain, headaches, palpitations, lightheadedness, dizziness, shortness of breath, or cough. Appetite  is good. Denies any GERD symptoms. Denies any difficulty with swallowing, nausea, or vomiting.  Denies any abdominal pain, diarrhea or constipation. Denies any urinary symptoms. No insomnia. No active bleeding. No rash.     PHYSICAL EXAMINATION:  Vitals:    08/19/19 2319   BP: 137/67   Pulse: 71   Resp: 16   Temp: 97.7  F (36.5  C)   SpO2: 97%   Weight: (!) 223 lb (101.2 kg)       GENERAL: Awake, Alert, oriented x3, not in any form of acute distress, answers questions appropriately, follows simple commands, conversant  HEENT: Head is normocephalic with normal hair distribution. No evidence of trauma. Ears: No acute purulent discharge. Eyes: Conjunctivae pink with no scleral jaundice. Nose: Normal mucosa and septum. NECK: Supple with no cervical or supraclavicular lymphadenopathy. Trachea is midline.   CHEST: No tenderness or deformity, no crepitus  LUNG: Clear to auscultation with good poor chest expansion. There are no crackles or wheezes, normal AP diameter.  BACK: No kyphosis of the thoracic spine. Symmetric, no curvature, ROM normal, no CVA tenderness, no spinal tenderness   CVS: There is good S1  S2, there are no murmurs, rubs, gallops, or heaves, rhythm is regular,  2+ pulses symmetric in all extremities.  ABDOMEN: Globular and soft, nontender to palpation, non distended, no masses, no organomegaly, good bowel sounds, no rebound or guarding, no peritoneal signs.   EXTREMITIES:  Full range of motion on both upper and lower extremities, there is no tenderness to palpation, no pedal edema, no cyanosis or clubbing, no calf tenderness.  Pulses equal in all extremities, normal cap refill, no joint swelling.  SKIN: Warm and dry, no erythema noted.  Skin color, texture, no rashes or lesions.  NEUROLOGICAL: The patient is oriented to person, place and time. Strength and sensation are grossly intact. Face is symmetric.    LABS:      Lab Results   Component Value Date    WBC 7.0 08/12/2019    HGB 13.0 (L) 08/12/2019     HCT 44.5 08/12/2019    MCV 85 08/12/2019     08/12/2019     Results for orders placed or performed during the hospital encounter of 08/12/19   Basic metabolic panel   Result Value Ref Range    Sodium 141 136 - 145 mmol/L    Potassium 4.6 3.5 - 5.0 mmol/L    Chloride 101 98 - 107 mmol/L    CO2 31 22 - 31 mmol/L    Anion Gap, Calculation 9 5 - 18 mmol/L    Glucose 97 70 - 125 mg/dL    Calcium 9.2 8.5 - 10.5 mg/dL    BUN 21 8 - 28 mg/dL    Creatinine 0.87 0.70 - 1.30 mg/dL    GFR MDRD Af Amer >60 >60 mL/min/1.73m2    GFR MDRD Non Af Amer >60 >60 mL/min/1.73m2     ASSESSMENT/PLAN:    1. Chronic obstructive pulmonary disease, unspecified COPD type (H) - Oxygen dependent.  Continue Albuterol   2. Pre-syncope - Stable, followed by Cardiolgy   3. Paroxysmal atrial fibrillation (H) - Anticoagulated with Eliquis   4. Congestive heart failure, unspecified HF chronicity, unspecified heart failure type (H) - No overt signs or symptoms of decompensation, continue Coreg    5. Biventricular ICD (implantable cardioverter-defibrillator) in place - Stable         Electronically signed by:  Ronen lBackman CNP    Total time spent on the unit was 40 minutes of which 25 minutes was spent in counseling on gait instability, the need for more oxygen consumption as more energy is used and maintaining compliance with oxygen therapy  and coordination of care of the above plan with nursing staff, patient, and therapy.

## 2021-06-01 NOTE — PROGRESS NOTES
[facility]      VISIT TYPE: Discharge Summary (Pre-syncopal episode)    FACILITY: [facility] [[id number]]  CODE STATUS:  FULL CODE  PCP:Gabriel Weems MD       PHONE: [phone]      FAX: [phone]      DISCHARGE DIAGNOSIS:  1. Pre-syncope   no evidence of dizziness, any presyncopal episodes in our facility.  Did well with physical therapy/Occupational Therapy, did not take any pain medications as pain on his left hip had resolved   2. Chronic obstructive pulmonary disease, unspecified COPD type (H)   did not have any shortness of breath, respiratory symptoms while in her facility, continued on 2 to 3 L/min O2 via nasal cannula.  Continue inhalers   3. NICM (nonischemic cardiomyopathy) (H)   stable, no evidence for fluid overload despite decreasing Coreg upon discharge from the hospital.  Lasix 20 mg daily has been added but will need repeat BMP care of PCP in 1 week   4.      Proximal atrial fibrillation- stable with good rate control on Coreg, on Eliquis with no bleeding episodes        SKILLED NURSING FACILITY COURSE:  Efe Huertas is a 89 y.o. male with a history of atrial fibrillation, ICD, COPD who was admitted because of a mechanical fall with associated left hip pain.  Imaging studies including a CT scan of the head, chest x-ray, x-rays of the left elbow, knee and pelvis were negative for fractures.  Because of a possible presyncopal episode, cardiology was consulted in the hospital and was noted to have a short AV interval for which he is ICD was adjusted.  He was started on PT/OT and was sent to our facility for further rehab.  In our facility, he did very well with physical therapy.  He did not complain of left hip pain and did not avail of his tramadol for pain management.  He continues to be on 2 -3 L/min O2 via nasal cannula and he did not have any problems with COPD exacerbation.  He also did not have any weight changes and there was no evidence of fluid  overload/CHF during TCU stay.  His vital signs remained stable despite change in carvedilol dose.    Other review of systems are negative.      DISCHARGE MEDICATIONS:      Current Outpatient Medications on File Prior to Visit   Medication Sig Dispense Refill     acetaminophen (TYLENOL) 500 MG tablet Take 500-1,000 mg by mouth every 6 (six) hours as needed for pain.       albuterol (PROAIR HFA;PROVENTIL HFA;VENTOLIN HFA) 90 mcg/actuation inhaler Inhale 2 puffs every 6 (six) hours as needed for wheezing.       apixaban (ELIQUIS) 5 mg Tab tablet Take 5 mg by mouth 2 (two) times a day.       carvedilol (COREG) 12.5 MG tablet Take 1 tablet (12.5 mg total) by mouth 2 (two) times a day with meals. 60 tablet 0     docusate sodium (COLACE) 100 MG capsule Take 100 mg by mouth 2 (two) times a day as needed for constipation.       hydroxyurea (HYDREA) 500 mg capsule Take 500 mg by mouth daily. Higher dose on Tue and Thu       hydroxyurea (HYDREA) 500 mg capsule Take 500 mg by mouth 2 (two) times a week. Additional 500 mg on Tuesday and Thursday for total dose of 1000 mg       mirtazapine (REMERON) 7.5 MG tablet Take 7.5 mg by mouth at bedtime.       psyllium (METAMUCIL) 3.4 gram packet   Take 1 packet by mouth daily as needed.       Lasix 20 mg Qday       PHYSICAL EXAMINATION:  113/62, 98.1, 75, 223.2 pounds, 16, 93% room air  General: Awake, Alert, oriented x3, not in any form of acute distress, answers questions appropriately, follows simple commands, conversant, appears slightly frail  HEENT: Pink conjunctiva, anicteric sclerae, oral mucosa is moist, O2 via nasal cannula  NECK: Supple, without any lymphadenopathy, thyromegaly or any masses  LUNG: Clear to auscultation with poor chest expansion. There are no crackles or wheezes, normal AP diameter  BACK: No kyphosis of the thoracic spine  CVS: There is good S1  S2, there are no murmurs or heaves, rhythm is regular, pacemaker in place on left upper chest  ABDOMEN: Globular and  soft, nontender to palpation, no organomegaly, good bowel sounds  EXTREMITIES: Good range of motion on both upper and lower extremities, trace pedal edema bilaterally, no cyanosis or clubbing, no calf tenderness  SKIN: Warm and dry, no rashes or erythema noted    BMP on 9/3 shows a sodium of 137, potassium 4.5, BUN 27, creatinine 0.9, GFR greater than 60        DISCHARGE  PLAN:   I certify that this patient is under my care and that I, or a nurse practitioner or physician's assistant working with me, had a face-to-face encounter that meets the physician face-to-face encounter requirements with this patient.   Date of Face-to-Face Encounter: 9/3/19    I certify that, based on my findings, the following services are medically necessary home health services:    Due to frailty and weakness, will need home OT/PT.  Will also need home care services, , PHN/RN for medication supervision.  [facility] to assist with personal cares including a.m. and p.m. cares, shower and bathing, assistance with toileting needs.      This patient is homebound because: Patient is not able to drive himself to therapy and MD clinic for his cares        The patient is, or has been, under my care and I have initiated the establishment of the plan of care. This patient will be followed by a physician who will periodically review the plan of care.      MEDICAL EQUIPMENT NEEDS:  Patient will require continuous O2 at 2 to 3 L/min via nasal cannula, portable.  ICD 10-J 96.12                Greater than 35  Minutes spent at discharge for coordination of care and counseling regarding above medical issues    Electronically signed by:Karen Grey MD

## 2021-06-01 NOTE — PROGRESS NOTES
[facility]    NAME:  Efe Huertas             :  1928  MRN: [id number]  CODE STATUS:  FULL CODE    FACILITY:  [facility] [[id number]]       ROOM:   216    CHIEF COMPLAINT/REASON FOR VISIT:  Chief Complaint   Patient presents with     Problem Visit     Follow up on discharge     HISTORY OF PRESENT ILLNESS: Efe Huertas is a 89 y.o. male with past medical history of A fib, ICD  presented with mechanical fall and left hip pain.   Imaging including CT head, x-ray of chest, elbow left, left knee, pelvis unremarkable.  Cardiology evaluated patient, thought it could be related to short AV interval, does not appear in heart failure or any significant arrhythmias noted.  Pacemaker was adjusted.   PT/OT evaluated patient, they recommended TCU.  Initially, patient refused TCU and was going to discharge to home with home care.  He later agreed to discharge to TCU.  He is on 3L via NC of home O2  with activity related to his underlying CHF.    Today, patient is seen at the bedside.  He will discharge to home later today.  All last minute questions and concerns were answered. Normotensive. He will discharge home with continuous oxygen and walker.  He will use [facility] for PT/OT/HHA/RN/SW.     No past medical history on file.     No past surgical history on file.     No family history on file.     Social History     Socioeconomic History     Marital status:      Spouse name: Not on file     Number of children: Not on file     Years of education: Not on file     Highest education level: Not on file   Occupational History     Not on file   Social Needs     Financial resource strain: Not on file     Food insecurity:     Worry: Not on file     Inability: Not on file     Transportation needs:     Medical: Not on file     Non-medical: Not on file   Tobacco Use     Smoking status: Never Smoker     Smokeless tobacco: Never Used   Substance and Sexual Activity     Alcohol use:  Yes     Frequency: Monthly or less     Drinks per session: 1 or 2     Binge frequency: Never     Drug use: Never     Sexual activity: Not on file   Lifestyle     Physical activity:     Days per week: Not on file     Minutes per session: Not on file     Stress: Not on file   Relationships     Social connections:     Talks on phone: Not on file     Gets together: Not on file     Attends Denominational service: Not on file     Active member of club or organization: Not on file     Attends meetings of clubs or organizations: Not on file     Relationship status: Not on file     Intimate partner violence:     Fear of current or ex partner: Not on file     Emotionally abused: Not on file     Physically abused: Not on file     Forced sexual activity: Not on file   Other Topics Concern     Not on file   Social History Narrative     Not on file     No Known Allergies     Current Outpatient Medications   Medication Sig Dispense Refill     acetaminophen (TYLENOL) 500 MG tablet Take 500-1,000 mg by mouth every 6 (six) hours as needed for pain.       albuterol (PROAIR HFA;PROVENTIL HFA;VENTOLIN HFA) 90 mcg/actuation inhaler Inhale 2 puffs every 6 (six) hours as needed for wheezing.       apixaban (ELIQUIS) 5 mg Tab tablet Take 5 mg by mouth 2 (two) times a day.       carvedilol (COREG) 12.5 MG tablet Take 1 tablet (12.5 mg total) by mouth 2 (two) times a day with meals. 60 tablet 0     docusate sodium (COLACE) 100 MG capsule Take 100 mg by mouth 2 (two) times a day as needed for constipation.       hydroxyurea (HYDREA) 500 mg capsule Take 500 mg by mouth daily. Higher dose on Tue and Thu       hydroxyurea (HYDREA) 500 mg capsule Take 500 mg by mouth 2 (two) times a week. Additional 500 mg on Tuesday and Thursday for total dose of 1000 mg       mirtazapine (REMERON) 7.5 MG tablet Take 7.5 mg by mouth at bedtime.       psyllium (METAMUCIL) 3.4 gram packet Take 1 packet by mouth daily as needed.       No current facility-administered  medications for this visit.      REVIEW OF SYSTEMS:    Currently, no fever, chills, or rigors. Does not have any visual or hearing problems. Denies any chest pain, headaches, palpitations, lightheadedness, dizziness, shortness of breath, or cough. Appetite is good. Denies any GERD symptoms. Denies any difficulty with swallowing, nausea, or vomiting.  Denies any abdominal pain, diarrhea or constipation. Denies any urinary symptoms. No insomnia. No active bleeding. No rash.     PHYSICAL EXAMINATION:  Vitals:    09/09/19 2048   BP: 149/70   Pulse: 67   Resp: 16   Temp: 97.8  F (36.6  C)   SpO2: 94%   Weight: (!) 223 lb 6.4 oz (101.3 kg)       GENERAL: Awake, Alert, oriented x3, not in any form of acute distress, answers questions appropriately, follows simple commands, conversant  HEENT: Head is normocephalic with normal hair distribution. No evidence of trauma. Ears: No acute purulent discharge. Eyes: Conjunctivae pink with no scleral jaundice. Nose: Normal mucosa and septum. NECK: Supple with no cervical or supraclavicular lymphadenopathy. Trachea is midline.   CHEST: No tenderness or deformity, no crepitus  LUNG: Clear to auscultation with good poor chest expansion. There are no crackles or wheezes, normal AP diameter.  BACK: No kyphosis of the thoracic spine. Symmetric, no curvature, ROM normal, no CVA tenderness, no spinal tenderness   CVS: There is good S1  S2, there are no murmurs, rubs, gallops, or heaves, rhythm is regular,  2+ pulses symmetric in all extremities.  ABDOMEN: Globular and soft, nontender to palpation, non distended, no masses, no organomegaly, good bowel sounds, no rebound or guarding, no peritoneal signs.   EXTREMITIES:  Full range of motion on both upper and lower extremities, there is no tenderness to palpation, no pedal edema, no cyanosis or clubbing, no calf tenderness.  Pulses equal in all extremities, normal cap refill, no joint swelling.  SKIN: Warm and dry, no erythema noted.  Skin  color, texture, no rashes or lesions.  NEUROLOGICAL: The patient is oriented to person, place and time. Strength and sensation are grossly intact. Face is symmetric.    LABS:      Lab Results   Component Value Date    WBC 7.0 08/12/2019    HGB 13.0 (L) 08/12/2019    HCT 44.5 08/12/2019    MCV 85 08/12/2019     08/12/2019     Results for orders placed or performed in visit on 09/03/19   Basic Metabolic Panel   Result Value Ref Range    Sodium 137 136 - 145 mmol/L    Potassium 4.5 3.5 - 5.0 mmol/L    Chloride 98 98 - 107 mmol/L    CO2 32 (H) 22 - 31 mmol/L    Anion Gap, Calculation 7 5 - 18 mmol/L    Glucose 74 70 - 125 mg/dL    Calcium 9.0 8.5 - 10.5 mg/dL    BUN 27 8 - 28 mg/dL    Creatinine 0.90 0.70 - 1.30 mg/dL    GFR MDRD Af Amer >60 >60 mL/min/1.73m2    GFR MDRD Non Af Amer >60 >60 mL/min/1.73m2     ASSESSMENT/PLAN:    1. Chronic respiratory failure, unspecified whether with hypoxia or hypercapnia (H) - Oxygen dependent   2. Chronic obstructive pulmonary disease, unspecified COPD type (H) - See above   3. Paroxysmal atrial fibrillation (H) - Anticoagulated with Eliquis   4. Congestive heart failure, unspecified HF chronicity, unspecified heart failure type (H) - No overt signs or symptoms of decompensation, continue Coreg    5. Biventricular ICD (implantable cardioverter-defibrillator) in place - Stable                     Electronically signed by:  Ronen Blackman CNP

## 2021-06-02 NOTE — PROGRESS NOTES
[facility]    NAME:  Efe Huertas             :  1928  MRN: [id number]  CODE STATUS:  FULL CODE    FACILITY:  formerly [facility] [[id number]]       ROOM:   216    CHIEF COMPLAINT/REASON FOR VISIT:  Chief Complaint   Patient presents with     Problem Visit     Chronic respiratory failure     HISTORY OF PRESENT ILLNESS: Efe Huertas is a 89 y.o. male with past medical history of A fib, ICD  presented with mechanical fall and left hip pain.   Imaging including CT head, x-ray of chest, elbow left, left knee, pelvis unremarkable.  Cardiology evaluated patient, thought it could be related to short AV interval, does not appear in heart failure or any significant arrhythmias noted.  Pacemaker was adjusted.   PT/OT evaluated patient, they recommended TCU.  Initially, patient refused TCU and was going to discharge to home with home care.  He later agreed to discharge to TCU.  He is on 3L via NC of home O2  with activity related to his underlying CHF.    Today, patient is seen at the bedside.  He has chronic respiratory failure and is continues on 2L of oxygen via NC.  Reports being on oxygen therapy at home during the night. He is on Eliquis for Atrial Fibrillation.  No recent falls.  Denies any dizziness.  Normotensive.    No past medical history on file.     No past surgical history on file.     No family history on file.     Social History     Socioeconomic History     Marital status:      Spouse name: Not on file     Number of children: Not on file     Years of education: Not on file     Highest education level: Not on file   Occupational History     Not on file   Social Needs     Financial resource strain: Not on file     Food insecurity:     Worry: Not on file     Inability: Not on file     Transportation needs:     Medical: Not on file     Non-medical: Not on file   Tobacco Use     Smoking status: Never Smoker     Smokeless tobacco: Never Used   Substance and Sexual  Activity     Alcohol use: Yes     Frequency: Monthly or less     Drinks per session: 1 or 2     Binge frequency: Never     Drug use: Never     Sexual activity: Not on file   Lifestyle     Physical activity:     Days per week: Not on file     Minutes per session: Not on file     Stress: Not on file   Relationships     Social connections:     Talks on phone: Not on file     Gets together: Not on file     Attends Mosque service: Not on file     Active member of club or organization: Not on file     Attends meetings of clubs or organizations: Not on file     Relationship status: Not on file     Intimate partner violence:     Fear of current or ex partner: Not on file     Emotionally abused: Not on file     Physically abused: Not on file     Forced sexual activity: Not on file   Other Topics Concern     Not on file   Social History Narrative     Not on file     No Known Allergies     Current Outpatient Medications   Medication Sig Dispense Refill     acetaminophen (TYLENOL) 500 MG tablet Take 500-1,000 mg by mouth every 6 (six) hours as needed for pain.       albuterol (PROAIR HFA;PROVENTIL HFA;VENTOLIN HFA) 90 mcg/actuation inhaler Inhale 2 puffs every 6 (six) hours as needed for wheezing.       apixaban (ELIQUIS) 5 mg Tab tablet Take 5 mg by mouth 2 (two) times a day.       carvedilol (COREG) 12.5 MG tablet Take 1 tablet (12.5 mg total) by mouth 2 (two) times a day with meals. 60 tablet 0     docusate sodium (COLACE) 100 MG capsule Take 100 mg by mouth 2 (two) times a day as needed for constipation.       hydroxyurea (HYDREA) 500 mg capsule Take 500 mg by mouth daily. Higher dose on Tue and Thu       hydroxyurea (HYDREA) 500 mg capsule Take 500 mg by mouth 2 (two) times a week. Additional 500 mg on Tuesday and Thursday for total dose of 1000 mg       mirtazapine (REMERON) 7.5 MG tablet Take 7.5 mg by mouth at bedtime.       psyllium (METAMUCIL) 3.4 gram packet Take 1 packet by mouth daily as needed.       No current  facility-administered medications for this visit.      REVIEW OF SYSTEMS:    Currently, no fever, chills, or rigors. Does not have any visual or hearing problems. Denies any chest pain, headaches, palpitations, lightheadedness, dizziness, shortness of breath, or cough. Appetite is good. Denies any GERD symptoms. Denies any difficulty with swallowing, nausea, or vomiting.  Denies any abdominal pain, diarrhea or constipation. Denies any urinary symptoms. No insomnia. No active bleeding. No rash.     PHYSICAL EXAMINATION:  Vitals:    10/11/19 2000   BP: 136/69   Pulse: 69   Resp: 16   Temp: 98.1  F (36.7  C)   SpO2: 95%   Weight: (!) 226 lb 9.6 oz (102.8 kg)       GENERAL: Awake, Alert, oriented x3, not in any form of acute distress, answers questions appropriately, follows simple commands, conversant  HEENT: Head is normocephalic with normal hair distribution. No evidence of trauma. Ears: No acute purulent discharge. Eyes: Conjunctivae pink with no scleral jaundice. Nose: Normal mucosa and septum. NECK: Supple with no cervical or supraclavicular lymphadenopathy. Trachea is midline.   CHEST: No tenderness or deformity, no crepitus  LUNG: Clear to auscultation with good poor chest expansion. There are no crackles or wheezes, normal AP diameter.  BACK: No kyphosis of the thoracic spine. Symmetric, no curvature, ROM normal, no CVA tenderness, no spinal tenderness   CVS: There is good S1  S2, there are no murmurs, rubs, gallops, or heaves, rhythm is regular,  2+ pulses symmetric in all extremities.  ABDOMEN: Globular and soft, nontender to palpation, non distended, no masses, no organomegaly, good bowel sounds, no rebound or guarding, no peritoneal signs.   EXTREMITIES:  Full range of motion on both upper and lower extremities, there is no tenderness to palpation, bilateral lower extremity edema, no cyanosis or clubbing, no calf tenderness.  Pulses equal in all extremities, normal cap refill, no joint swelling.  SKIN: Warm  and dry, no erythema noted.  Skin color, texture, no rashes or lesions.  NEUROLOGICAL: The patient is oriented to person, place and time. Strength and sensation are grossly intact. Face is symmetric.    LABS:      Lab Results   Component Value Date    WBC 7.0 08/12/2019    HGB 13.0 (L) 08/12/2019    HCT 44.5 08/12/2019    MCV 85 08/12/2019     08/12/2019     Results for orders placed or performed during the hospital encounter of 08/12/19   Basic metabolic panel   Result Value Ref Range    Sodium 141 136 - 145 mmol/L    Potassium 4.6 3.5 - 5.0 mmol/L    Chloride 101 98 - 107 mmol/L    CO2 31 22 - 31 mmol/L    Anion Gap, Calculation 9 5 - 18 mmol/L    Glucose 97 70 - 125 mg/dL    Calcium 9.2 8.5 - 10.5 mg/dL    BUN 21 8 - 28 mg/dL    Creatinine 0.87 0.70 - 1.30 mg/dL    GFR MDRD Af Amer >60 >60 mL/min/1.73m2    GFR MDRD Non Af Amer >60 >60 mL/min/1.73m2     ASSESSMENT/PLAN:    1. Congestive heart failure, unspecified HF chronicity, unspecified heart failure type (H) - Will start Lasix and continue Coreg   2. Chronic respiratory failure with hypoxia (H) -  Oxygen dependent, will attempt to wean off day time oxygen but continue night time oxygen   3. Chronic obstructive pulmonary disease, unspecified COPD type (H) - Continues  oxygen therapy continuously on 2L via NC as well as Albuterol   4. CHB (complete heart block) (H) - s/p Biventricular ICD   5. Biventricular ICD (implantable cardioverter-defibrillator) in place - See above   6. Bilateral lower extremity edema - See above                     Electronically signed by:  Ronen Blackman CNP

## 2021-06-16 PROBLEM — R55 PRE-SYNCOPE: Status: ACTIVE | Noted: 2019-08-13

## 2021-06-16 PROBLEM — R42 DIZZINESS: Status: ACTIVE | Noted: 2019-08-12

## 2021-06-16 PROBLEM — K59.00 CONSTIPATION: Status: ACTIVE | Noted: 2021-01-01

## 2021-06-19 NOTE — LETTER
Letter by Constantine Bergeron MD at      Author: Constantine Bergeron MD Service: -- Author Type: --    Filed:  Encounter Date: 2019 Status: (Other)         Patient: Efe Huertas   MR Number: [id number]   YOB: 1928   Date of Visit: 2019     [facility]    Facility:   [facility] [[id number]]   Code Status: FULL CODE        Admission History and Physical   Efe Huertas,  1928, MRN [id number]    PCP: Gabriel Weems MD, [phone]    Date of Service   Code status:  [unfilled]       Extended Emergency Contact Information  Primary Emergency Contact: IAN GA  Home Phone: [phone]  Mobile Phone: [phone]  Relation: Child   needed? No  Secondary Emergency Contact: BRAD HUERTAS  Mobile Phone: [phone]  Relation: Child       Assessment and Plan         1. Recent  Hospitalization  at [facility] on 19 and discharged on 19.  history of A fib, ICD, chronic hypercapnic respiratory failure on oxygen .  He  presented with mechanical fall and left hip pain.   Imaging including CT head, x-ray of chest, elbow left, left knee, pelvis unremarkable.  Cardiology evaluated patient, could be related to short AV interval, did not appear in heart failure or significant arrhythmias noted.  Pacemaker was adjusted.  PT/OT evaluated patient, they recommended TCU. Home O2 evaluation was done and he qualified for 3 L of oxygen with activity related to his underlying CHF  and COPD.    2. Chronic hypercapnic respiratory failure : on oxygen    3. A fib, h/o : on eliquis, coreg 12.5 mg two times a day    4. Polycythemia, h/o : on Hydorxyurea    5. Prostrate cancer h/o           Disposition/Plan :          Patient is a  89 yr old male. He was recently hospitalized at [facility] on 19 and discharged on 19.  history of A fib, ICD, chronic hypercapnic respiratory failure on oxygen .  He  presented with mechanical fall and left hip pain.   Imaging  including CT head, x-ray of chest, elbow left, left knee, pelvis unremarkable.  Cardiology evaluated patient, could be related to short AV interval, did not appear in heart failure or significant arrhythmias noted.  Pacemaker was adjusted.  PT/OT evaluated patient, they recommended TCU. Home O2 evaluation was done and he qualified for 3 L of oxygen with activity related to his underlying CHF  and COPD.       Patient was then subsequently [facility], pt and ot. Patient is medically stable at this time. He denies any significant complaints at this time. He denies any shortness of breath and his respiratory status seems stable. He is on 1.5 L of oxygen at this time.           Chief Complaint: Pre syncope     History of Present Illness         Patient is a  89 yr old male. He was recently hospitalized at [facility] on 8/12/19 and discharged on 8/13/19.  history of A fib, ICD, chronic hypercapnic respiratory failure on oxygen .  He  presented with mechanical fall and left hip pain.   Imaging including CT head, x-ray of chest, elbow left, left knee, pelvis unremarkable.  Cardiology evaluated patient, could be related to short AV interval, did not appear in heart failure or significant arrhythmias noted.  Pacemaker was adjusted.  PT/OT evaluated patient, they recommended TCU. Home O2 evaluation was done and he qualified for 3 L of oxygen with activity related to his underlying CHF  and COPD.       Patient was then subsequently [facility], pt and ot. Patient is medically stable at this time. He denies any significant complaints at this time. He denies any shortness of breath and his respiratory status seems stable. He is on 1.5 L of oxygen at this time.          No h/o fever or chills  No cardiorespiratory symptoms  No abdominal symptoms  No urinary symptoms  No neuro symptoms.       Medical History  Active Ambulatory (Non-Hospital) Problems    Diagnosis   ? Pre-syncope    ? Ventricular bigeminy   ? Dizziness   ? Obstructive airway disease (H)   ? Encounter for servicing of implantable cardioverter-defibrillator (ICD) at end of battery life   ? CHB (complete heart block) (H)   ? A-fib (H)   ? Biventricular ICD (implantable cardioverter-defibrillator) in place   ? NICM (nonischemic cardiomyopathy) (H)   ? Colovesical fistula   ? Congestive heart failure (H)     No past medical history on file.     Surgical History  He  has no past surgical history on file.       Social History  Reviewed, and he  reports that he has never smoked. He has never used smokeless tobacco. He reports that he drinks alcohol. He reports that he does not use drugs.     Allergies  No Known Allergies Family History  Reviewed, and family history is not on file.          Prior to Admission Medications     (Not in a hospital admission)       Review of Systems:  A 12 point comprehensive review of systems was negative except as noted. Physical Exam:      HEENT : no distended veins, no lymphadenopathy, thyroid is normal  LUNGS : b/l air entry present, no significant crackles/wheezing.  ABDOMEN : soft, non-tender, non-distended, BS present.  HEART :  Regular rate & rhythm, S1 & S2 normal, no murmur, clicks/rubs, no ankle edema,  NEURO : conscious, oriented, responds to commands, no obvious focal deficit.  MUSCULOSKELETAL : EXTREMITIES/BACK :  no calf tenderness.  SKIN : no rashes      Vitals:    09/03/19 1055   BP: 140/78   Pulse: 66   Resp: 17   Temp: 97.9  F (36.6  C)   SpO2: 94%                    Pertinent Labs      Lab Results: personally reviewed.   Lab Results   Component Value Date     09/03/2019    K 4.5 09/03/2019    CL 98 09/03/2019    CO2 32 (H) 09/03/2019    BUN 27 09/03/2019    CREATININE 0.90 09/03/2019    CALCIUM 9.0 09/03/2019       Pertinent Radiology      Radiology Results: Personally reviewed image/s  EKG Results:    Advanced Care Planning             Total Time Spent > 45 mins.  Discharge  Planning discussed with Patient and Family  Discussed care with Patient for 35  Minutes and greater than 50% of total time spent in coordinating the plan of care.             Electronically signed by: Constantine Bergeron MD

## 2021-06-19 NOTE — LETTER
Letter by Ronen Blackman CNP at      Author: Ronen Blackman CNP Service: -- Author Type: --    Filed:  Encounter Date: 2019 Status: (Other)         Patient: Efe Huertas   MR Number: [id number]   YOB: 1928   Date of Visit: 2019          [facility]    NAME:  Efe Huertas             :  1928  MRN: [id number]  CODE STATUS:  FULL CODE    FACILITY:  [facility] [[id number]]       ROOM:   216    CHIEF COMPLAINT/REASON FOR VISIT:  Chief Complaint   Patient presents with   ? Problem Visit     CHF     HISTORY OF PRESENT ILLNESS: Efe Huertas is a 89 y.o. male with past medical history of A fib, ICD  presented with mechanical fall and left hip pain.   Imaging including CT head, x-ray of chest, elbow left, left knee, pelvis unremarkable.  Cardiology evaluated patient, thought it could be related to short AV interval, does not appear in heart failure or any significant arrhythmias noted.  Pacemaker was adjusted.   PT/OT evaluated patient, they recommended TCU.  Initially, patient refused TCU and was going to discharge to home with home care.  He later agreed to discharge to TCU.  He is on 3L via NC of home O2  with activity related to his underlying CHF.    Today, patient is seen at the bedside.  He has a skin tear on his left forearm that is healing. Denies any pain.  Normotensive.  He continues on 2L of oxygen via NC.  Reports being on oxygen therapy at home during the night.  It is unclear if he is compliant with this at home.  Denies any episodes of dizziness.  He is on Eliquis for Atrial Fibrillation.    No past medical history on file.     No past surgical history on file.     No family history on file.     Social History     Socioeconomic History   ? Marital status:      Spouse name: Not on file   ? Number of children: Not on file   ? Years of education: Not on file   ? Highest education level: Not on file    Occupational History   ? Not on file   Social Needs   ? Financial resource strain: Not on file   ? Food insecurity:     Worry: Not on file     Inability: Not on file   ? Transportation needs:     Medical: Not on file     Non-medical: Not on file   Tobacco Use   ? Smoking status: Never Smoker   ? Smokeless tobacco: Never Used   Substance and Sexual Activity   ? Alcohol use: Yes     Frequency: Monthly or less     Drinks per session: 1 or 2     Binge frequency: Never   ? Drug use: Never   ? Sexual activity: Not on file   Lifestyle   ? Physical activity:     Days per week: Not on file     Minutes per session: Not on file   ? Stress: Not on file   Relationships   ? Social connections:     Talks on phone: Not on file     Gets together: Not on file     Attends Sabianism service: Not on file     Active member of club or organization: Not on file     Attends meetings of clubs or organizations: Not on file     Relationship status: Not on file   ? Intimate partner violence:     Fear of current or ex partner: Not on file     Emotionally abused: Not on file     Physically abused: Not on file     Forced sexual activity: Not on file   Other Topics Concern   ? Not on file   Social History Narrative   ? Not on file     No Known Allergies     Current Outpatient Medications   Medication Sig Dispense Refill   ? acetaminophen (TYLENOL) 500 MG tablet Take 500-1,000 mg by mouth every 6 (six) hours as needed for pain.     ? albuterol (PROAIR HFA;PROVENTIL HFA;VENTOLIN HFA) 90 mcg/actuation inhaler Inhale 2 puffs every 6 (six) hours as needed for wheezing.     ? apixaban (ELIQUIS) 5 mg Tab tablet Take 5 mg by mouth 2 (two) times a day.     ? carvedilol (COREG) 12.5 MG tablet Take 1 tablet (12.5 mg total) by mouth 2 (two) times a day with meals. 60 tablet 0   ? docusate sodium (COLACE) 100 MG capsule Take 100 mg by mouth 2 (two) times a day as needed for constipation.     ? hydroxyurea (HYDREA) 500 mg capsule Take 500 mg by mouth daily.  Higher dose on Tue and Thu     ? hydroxyurea (HYDREA) 500 mg capsule Take 500 mg by mouth 2 (two) times a week. Additional 500 mg on Tuesday and Thursday for total dose of 1000 mg     ? mirtazapine (REMERON) 7.5 MG tablet Take 7.5 mg by mouth at bedtime.     ? psyllium (METAMUCIL) 3.4 gram packet Take 1 packet by mouth daily as needed.       No current facility-administered medications for this visit.      REVIEW OF SYSTEMS:    Currently, no fever, chills, or rigors. Does not have any visual or hearing problems. Denies any chest pain, headaches, palpitations, lightheadedness, dizziness, shortness of breath, or cough. Appetite is good. Denies any GERD symptoms. Denies any difficulty with swallowing, nausea, or vomiting.  Denies any abdominal pain, diarrhea or constipation. Denies any urinary symptoms. No insomnia. No active bleeding. No rash.     PHYSICAL EXAMINATION:  Vitals:    09/03/19 2152   BP: 147/73   Pulse: 76   Resp: 18   Temp: 97.5  F (36.4  C)   SpO2: 94%   Weight: (!) 227 lb 1.6 oz (103 kg)       GENERAL: Awake, Alert, oriented x3, not in any form of acute distress, answers questions appropriately, follows simple commands, conversant  HEENT: Head is normocephalic with normal hair distribution. No evidence of trauma. Ears: No acute purulent discharge. Eyes: Conjunctivae pink with no scleral jaundice. Nose: Normal mucosa and septum. NECK: Supple with no cervical or supraclavicular lymphadenopathy. Trachea is midline.   CHEST: No tenderness or deformity, no crepitus  LUNG: Clear to auscultation with good poor chest expansion. There are no crackles or wheezes, normal AP diameter.  BACK: No kyphosis of the thoracic spine. Symmetric, no curvature, ROM normal, no CVA tenderness, no spinal tenderness   CVS: There is good S1  S2, there are no murmurs, rubs, gallops, or heaves, rhythm is regular,  2+ pulses symmetric in all extremities.  ABDOMEN: Globular and soft, nontender to palpation, non distended, no masses,  no organomegaly, good bowel sounds, no rebound or guarding, no peritoneal signs.   EXTREMITIES:  Full range of motion on both upper and lower extremities, there is no tenderness to palpation, no pedal edema, no cyanosis or clubbing, no calf tenderness.  Pulses equal in all extremities, normal cap refill, no joint swelling.  SKIN: Warm and dry, no erythema noted.  Skin color, texture, no rashes or lesions.  NEUROLOGICAL: The patient is oriented to person, place and time. Strength and sensation are grossly intact. Face is symmetric.    LABS:      Lab Results   Component Value Date    WBC 7.0 08/12/2019    HGB 13.0 (L) 08/12/2019    HCT 44.5 08/12/2019    MCV 85 08/12/2019     08/12/2019     Results for orders placed or performed during the hospital encounter of 08/12/19   Basic metabolic panel   Result Value Ref Range    Sodium 141 136 - 145 mmol/L    Potassium 4.6 3.5 - 5.0 mmol/L    Chloride 101 98 - 107 mmol/L    CO2 31 22 - 31 mmol/L    Anion Gap, Calculation 9 5 - 18 mmol/L    Glucose 97 70 - 125 mg/dL    Calcium 9.2 8.5 - 10.5 mg/dL    BUN 21 8 - 28 mg/dL    Creatinine 0.87 0.70 - 1.30 mg/dL    GFR MDRD Af Amer >60 >60 mL/min/1.73m2    GFR MDRD Non Af Amer >60 >60 mL/min/1.73m2     ASSESSMENT/PLAN:    1. Congestive heart failure, unspecified HF chronicity, unspecified heart failure type (H)  - No overt signs or symptoms of decompensation, continue Coreg    2. Paroxysmal atrial fibrillation (H)  - Anticoagulated with Eliquis   3. Chronic obstructive pulmonary disease, unspecified COPD type (H) - Continues  oxygen therapy continuously on 2L via NC as well as Albuterol   4. Dizziness - Stable               Electronically signed by:  Ronen Blackman CNP

## 2021-06-19 NOTE — LETTER
Letter by Ronen Blackman CNP at      Author: Ronen Blackman CNP Service: -- Author Type: --    Filed:  Encounter Date: 2019 Status: (Other)         Patient: Efe Huertas   MR Number: [id number]   YOB: 1928   Date of Visit: 2019          [facility]    NAME:  Efe Huertas             :  1928  MRN: [id number]  CODE STATUS:  FULL CODE    FACILITY:  [facility] [[id number]]       ROOM:   216    CHIEF COMPLAINT/REASON FOR VISIT:  Chief Complaint   Patient presents with   ? Problem Visit     Fall with left hip pain     HISTORY OF PRESENT ILLNESS: Efe Huertas is a 89 y.o. male with past medical history of A fib, ICD  presented with mechanical fall and left hip pain.   Imaging including CT head, x-ray of chest, elbow left, left knee, pelvis unremarkable.  Cardiology evaluated patient, thought it could be related to short AV interval, does not appear in heart failure or any significant arrhythmias noted.  Pacemaker was adjusted.   PT/OT evaluated patient, they recommended TCU.  Initially, patient refused TCU and was going to discharge to home with home care.   He later agreed to discharge to TCU.  He is on 3L via NC of home O2  with activity related to his underlying CHF.       No past medical history on file.     No past surgical history on file.     No family history on file.     Social History     Socioeconomic History   ? Marital status:      Spouse name: Not on file   ? Number of children: Not on file   ? Years of education: Not on file   ? Highest education level: Not on file   Occupational History   ? Not on file   Social Needs   ? Financial resource strain: Not on file   ? Food insecurity:     Worry: Not on file     Inability: Not on file   ? Transportation needs:     Medical: Not on file     Non-medical: Not on file   Tobacco Use   ? Smoking status: Never Smoker   ? Smokeless tobacco: Never Used   Substance  and Sexual Activity   ? Alcohol use: Yes     Frequency: Monthly or less     Drinks per session: 1 or 2     Binge frequency: Never   ? Drug use: Never   ? Sexual activity: Not on file   Lifestyle   ? Physical activity:     Days per week: Not on file     Minutes per session: Not on file   ? Stress: Not on file   Relationships   ? Social connections:     Talks on phone: Not on file     Gets together: Not on file     Attends Judaism service: Not on file     Active member of club or organization: Not on file     Attends meetings of clubs or organizations: Not on file     Relationship status: Not on file   ? Intimate partner violence:     Fear of current or ex partner: Not on file     Emotionally abused: Not on file     Physically abused: Not on file     Forced sexual activity: Not on file   Other Topics Concern   ? Not on file   Social History Narrative   ? Not on file     No Known Allergies     Current Outpatient Medications   Medication Sig Dispense Refill   ? acetaminophen (TYLENOL) 500 MG tablet Take 500-1,000 mg by mouth every 6 (six) hours as needed for pain.     ? albuterol (PROAIR HFA;PROVENTIL HFA;VENTOLIN HFA) 90 mcg/actuation inhaler Inhale 2 puffs every 6 (six) hours as needed for wheezing.     ? apixaban (ELIQUIS) 5 mg Tab tablet Take 5 mg by mouth 2 (two) times a day.     ? carvedilol (COREG) 12.5 MG tablet Take 1 tablet (12.5 mg total) by mouth 2 (two) times a day with meals. 60 tablet 0   ? docusate sodium (COLACE) 100 MG capsule Take 100 mg by mouth 2 (two) times a day as needed for constipation.     ? hydroxyurea (HYDREA) 500 mg capsule Take 500 mg by mouth daily. Higher dose on Tue and Thu     ? hydroxyurea (HYDREA) 500 mg capsule Take 500 mg by mouth 2 (two) times a week. Additional 500 mg on Tuesday and Thursday for total dose of 1000 mg     ? mirtazapine (REMERON) 7.5 MG tablet Take 7.5 mg by mouth at bedtime.     ? psyllium (METAMUCIL) 3.4 gram packet Take 1 packet by mouth daily as needed.        No current facility-administered medications for this visit.      REVIEW OF SYSTEMS:    Currently, no fever, chills, or rigors. Does not have any visual or hearing problems. Denies any chest pain, headaches, palpitations, lightheadedness, dizziness, shortness of breath, or cough. Appetite is good. Denies any GERD symptoms. Denies any difficulty with swallowing, nausea, or vomiting.  Denies any abdominal pain, diarrhea or constipation. Denies any urinary symptoms. No insomnia. No active bleeding. No rash.     PHYSICAL EXAMINATION:  Vitals:    08/19/19 2319   BP: 137/67   Pulse: 71   Resp: 16   Temp: 97.7  F (36.5  C)   SpO2: 97%   Weight: (!) 223 lb (101.2 kg)       GENERAL: Awake, Alert, oriented x3, not in any form of acute distress, answers questions appropriately, follows simple commands, conversant  HEENT: Head is normocephalic with normal hair distribution. No evidence of trauma. Ears: No acute purulent discharge. Eyes: Conjunctivae pink with no scleral jaundice. Nose: Normal mucosa and septum. NECK: Supple with no cervical or supraclavicular lymphadenopathy. Trachea is midline.   CHEST: No tenderness or deformity, no crepitus  LUNG: Clear to auscultation with good poor chest expansion. There are no crackles or wheezes, normal AP diameter.  BACK: No kyphosis of the thoracic spine. Symmetric, no curvature, ROM normal, no CVA tenderness, no spinal tenderness   CVS: There is good S1  S2, there are no murmurs, rubs, gallops, or heaves, rhythm is regular,  2+ pulses symmetric in all extremities.  ABDOMEN: Globular and soft, nontender to palpation, non distended, no masses, no organomegaly, good bowel sounds, no rebound or guarding, no peritoneal signs.   EXTREMITIES:  Full range of motion on both upper and lower extremities, there is no tenderness to palpation, no pedal edema, no cyanosis or clubbing, no calf tenderness.  Pulses equal in all extremities, normal cap refill, no joint swelling.  SKIN: Warm and dry,  no erythema noted.  Skin color, texture, no rashes or lesions.  NEUROLOGICAL: The patient is oriented to person, place and time. Strength and sensation are grossly intact. Face is symmetric.    LABS:      Lab Results   Component Value Date    WBC 7.0 08/12/2019    HGB 13.0 (L) 08/12/2019    HCT 44.5 08/12/2019    MCV 85 08/12/2019     08/12/2019     Results for orders placed or performed during the hospital encounter of 08/12/19   Basic metabolic panel   Result Value Ref Range    Sodium 141 136 - 145 mmol/L    Potassium 4.6 3.5 - 5.0 mmol/L    Chloride 101 98 - 107 mmol/L    CO2 31 22 - 31 mmol/L    Anion Gap, Calculation 9 5 - 18 mmol/L    Glucose 97 70 - 125 mg/dL    Calcium 9.2 8.5 - 10.5 mg/dL    BUN 21 8 - 28 mg/dL    Creatinine 0.87 0.70 - 1.30 mg/dL    GFR MDRD Af Amer >60 >60 mL/min/1.73m2    GFR MDRD Non Af Amer >60 >60 mL/min/1.73m2     ASSESSMENT/PLAN:    1. Chronic obstructive pulmonary disease, unspecified COPD type (H) - Oxygen dependent.  Continue Albuterol   2. Pre-syncope - Stable, followed by Cardiolgy   3. Paroxysmal atrial fibrillation (H) - Anticoagulated with Eliquis   4. Congestive heart failure, unspecified HF chronicity, unspecified heart failure type (H) - No overt signs or symptoms of decompensation, continue Coreg    5. Biventricular ICD (implantable cardioverter-defibrillator) in place - Stable         Electronically signed by:  Ronen Blackman CNP    Total time spent on the unit was 40 minutes of which 25 minutes was spent in counseling on gait instability, the need for more oxygen consumption as more energy is used and maintaining compliance with oxygen therapy  and coordination of care of the above plan with nursing staff, patient, and therapy.

## 2021-06-19 NOTE — LETTER
Letter by Karen Grey MD at      Author: Karen Grey MD Service: -- Author Type: --    Filed:  Encounter Date: 9/3/2019 Status: (Other)         Patient: Efe Huertas   MR Number: [id number]   YOB: 1928   Date of Visit: 9/3/2019           [facility]      VISIT TYPE: Discharge Summary (Pre-syncopal episode)    FACILITY: [facility] [[id number]]  CODE STATUS:  FULL CODE  PCP:Gabriel Weems MD       PHONE: [phone]      FAX: [phone]      DISCHARGE DIAGNOSIS:  1. Pre-syncope   no evidence of dizziness, any presyncopal episodes in our facility.  Did well with physical therapy/Occupational Therapy, did not take any pain medications as pain on his left hip had resolved   2. Chronic obstructive pulmonary disease, unspecified COPD type (H)   did not have any shortness of breath, respiratory symptoms while in her facility, continued on 2 to 3 L/min O2 via nasal cannula.  Continue inhalers   3. NICM (nonischemic cardiomyopathy) (H)   stable, no evidence for fluid overload despite decreasing Coreg upon discharge from the hospital.  Lasix 20 mg daily has been added but will need repeat BMP care of PCP in 1 week   4.      Proximal atrial fibrillation- stable with good rate control on Coreg, on Eliquis with no bleeding episodes        SKILLED NURSING FACILITY COURSE:  Efe Huertas is a 89 y.o. male with a history of atrial fibrillation, ICD, COPD who was admitted because of a mechanical fall with associated left hip pain.  Imaging studies including a CT scan of the head, chest x-ray, x-rays of the left elbow, knee and pelvis were negative for fractures.  Because of a possible presyncopal episode, cardiology was consulted in the hospital and was noted to have a short AV interval for which he is ICD was adjusted.  He was started on PT/OT and was sent to our facility for further rehab.  In our facility, he did very well with physical therapy.  He  did not complain of left hip pain and did not avail of his tramadol for pain management.  He continues to be on 2 -3 L/min O2 via nasal cannula and he did not have any problems with COPD exacerbation.  He also did not have any weight changes and there was no evidence of fluid overload/CHF during TCU stay.  His vital signs remained stable despite change in carvedilol dose.    Other review of systems are negative.      DISCHARGE MEDICATIONS:      Current Outpatient Medications on File Prior to Visit   Medication Sig Dispense Refill   ? acetaminophen (TYLENOL) 500 MG tablet Take 500-1,000 mg by mouth every 6 (six) hours as needed for pain.     ? albuterol (PROAIR HFA;PROVENTIL HFA;VENTOLIN HFA) 90 mcg/actuation inhaler Inhale 2 puffs every 6 (six) hours as needed for wheezing.     ? apixaban (ELIQUIS) 5 mg Tab tablet Take 5 mg by mouth 2 (two) times a day.     ? carvedilol (COREG) 12.5 MG tablet Take 1 tablet (12.5 mg total) by mouth 2 (two) times a day with meals. 60 tablet 0   ? docusate sodium (COLACE) 100 MG capsule Take 100 mg by mouth 2 (two) times a day as needed for constipation.     ? hydroxyurea (HYDREA) 500 mg capsule Take 500 mg by mouth daily. Higher dose on Tue and Thu     ? hydroxyurea (HYDREA) 500 mg capsule Take 500 mg by mouth 2 (two) times a week. Additional 500 mg on Tuesday and Thursday for total dose of 1000 mg     ? mirtazapine (REMERON) 7.5 MG tablet Take 7.5 mg by mouth at bedtime.     ? psyllium (METAMUCIL) 3.4 gram packet   Take 1 packet by mouth daily as needed.       Lasix 20 mg Qday       PHYSICAL EXAMINATION:  113/62, 98.1, 75, 223.2 pounds, 16, 93% room air  General: Awake, Alert, oriented x3, not in any form of acute distress, answers questions appropriately, follows simple commands, conversant, appears slightly frail  HEENT: Pink conjunctiva, anicteric sclerae, oral mucosa is moist, O2 via nasal cannula  NECK: Supple, without any lymphadenopathy, thyromegaly or any masses  LUNG: Clear  to auscultation with poor chest expansion. There are no crackles or wheezes, normal AP diameter  BACK: No kyphosis of the thoracic spine  CVS: There is good S1  S2, there are no murmurs or heaves, rhythm is regular, pacemaker in place on left upper chest  ABDOMEN: Globular and soft, nontender to palpation, no organomegaly, good bowel sounds  EXTREMITIES: Good range of motion on both upper and lower extremities, trace pedal edema bilaterally, no cyanosis or clubbing, no calf tenderness  SKIN: Warm and dry, no rashes or erythema noted    BMP on 9/3 shows a sodium of 137, potassium 4.5, BUN 27, creatinine 0.9, GFR greater than 60        DISCHARGE  PLAN:   I certify that this patient is under my care and that I, or a nurse practitioner or physician's assistant working with me, had a face-to-face encounter that meets the physician face-to-face encounter requirements with this patient.   Date of Face-to-Face Encounter: 9/3/19    I certify that, based on my findings, the following services are medically necessary home health services:    Due to frailty and weakness, will need home OT/PT.  Will also need home care services, , PHN/RN for medication supervision.  [facility] to assist with personal cares including a.m. and p.m. cares, shower and bathing, assistance with toileting needs.      This patient is homebound because: Patient is not able to drive himself to therapy and MD clinic for his cares        The patient is, or has been, under my care and I have initiated the establishment of the plan of care. This patient will be followed by a physician who will periodically review the plan of care.      MEDICAL EQUIPMENT NEEDS:  Patient will require continuous O2 at 2 to 3 L/min via nasal cannula, portable.  ICD 10-J 96.12                Greater than 35  Minutes spent at discharge for coordination of care and counseling regarding above medical issues    Electronically signed by:Karen Grey MD

## 2021-06-19 NOTE — LETTER
Letter by Ronen Blackman CNP at      Author: Ronen Blackman CNP Service: -- Author Type: --    Filed:  Encounter Date: 2019 Status: (Other)         Patient: Efe Huertas   MR Number: [id number]   YOB: 1928   Date of Visit: 2019          [facility]    NAME:  Efe Huertas             :  1928  MRN: [id number]  CODE STATUS:  FULL CODE    FACILITY:  [facility] located [facility] [[id number]]       ROOM:   216    CHIEF COMPLAINT/REASON FOR VISIT:  Chief Complaint   Patient presents with   ? Problem Visit     Follow up on discharge     HISTORY OF PRESENT ILLNESS: Efe Huertas is a 89 y.o. male with past medical history of A fib, ICD  presented with mechanical fall and left hip pain.   Imaging including CT head, x-ray of chest, elbow left, left knee, pelvis unremarkable.  Cardiology evaluated patient, thought it could be related to short AV interval, does not appear in heart failure or any significant arrhythmias noted.  Pacemaker was adjusted.   PT/OT evaluated patient, they recommended TCU.  Initially, patient refused TCU and was going to discharge to home with home care.  He later agreed to discharge to TCU.  He is on 3L via NC of home O2  with activity related to his underlying CHF.    Today, patient is seen at the bedside.  He will discharge to home later today.  All last minute questions and concerns were answered. Normotensive. He will discharge home with continuous oxygen and walker.  He will use [facility] for PT/OT/HHA/RN/SW.     No past medical history on file.     No past surgical history on file.     No family history on file.     Social History     Socioeconomic History   ? Marital status:      Spouse name: Not on file   ? Number of children: Not on file   ? Years of education: Not on file   ? Highest education level: Not on file   Occupational History   ? Not on file   Social Needs   ? Financial resource strain:  Not on file   ? Food insecurity:     Worry: Not on file     Inability: Not on file   ? Transportation needs:     Medical: Not on file     Non-medical: Not on file   Tobacco Use   ? Smoking status: Never Smoker   ? Smokeless tobacco: Never Used   Substance and Sexual Activity   ? Alcohol use: Yes     Frequency: Monthly or less     Drinks per session: 1 or 2     Binge frequency: Never   ? Drug use: Never   ? Sexual activity: Not on file   Lifestyle   ? Physical activity:     Days per week: Not on file     Minutes per session: Not on file   ? Stress: Not on file   Relationships   ? Social connections:     Talks on phone: Not on file     Gets together: Not on file     Attends [employer] service: Not on file     Active member of club or organization: Not on file     Attends meetings of clubs or organizations: Not on file     Relationship status: Not on file   ? Intimate partner violence:     Fear of current or ex partner: Not on file     Emotionally abused: Not on file     Physically abused: Not on file     Forced sexual activity: Not on file   Other Topics Concern   ? Not on file   Social History Narrative   ? Not on file     No Known Allergies     Current Outpatient Medications   Medication Sig Dispense Refill   ? acetaminophen (TYLENOL) 500 MG tablet Take 500-1,000 mg by mouth every 6 (six) hours as needed for pain.     ? albuterol (PROAIR HFA;PROVENTIL HFA;VENTOLIN HFA) 90 mcg/actuation inhaler Inhale 2 puffs every 6 (six) hours as needed for wheezing.     ? apixaban (ELIQUIS) 5 mg Tab tablet Take 5 mg by mouth 2 (two) times a day.     ? carvedilol (COREG) 12.5 MG tablet Take 1 tablet (12.5 mg total) by mouth 2 (two) times a day with meals. 60 tablet 0   ? docusate sodium (COLACE) 100 MG capsule Take 100 mg by mouth 2 (two) times a day as needed for constipation.     ? hydroxyurea (HYDREA) 500 mg capsule Take 500 mg by mouth daily. Higher dose on Tue and Thu     ? hydroxyurea (HYDREA) 500 mg capsule Take 500 mg by  mouth 2 (two) times a week. Additional 500 mg on Tuesday and Thursday for total dose of 1000 mg     ? mirtazapine (REMERON) 7.5 MG tablet Take 7.5 mg by mouth at bedtime.     ? psyllium (METAMUCIL) 3.4 gram packet Take 1 packet by mouth daily as needed.       No current facility-administered medications for this visit.      REVIEW OF SYSTEMS:    Currently, no fever, chills, or rigors. Does not have any visual or hearing problems. Denies any chest pain, headaches, palpitations, lightheadedness, dizziness, shortness of breath, or cough. Appetite is good. Denies any GERD symptoms. Denies any difficulty with swallowing, nausea, or vomiting.  Denies any abdominal pain, diarrhea or constipation. Denies any urinary symptoms. No insomnia. No active bleeding. No rash.     PHYSICAL EXAMINATION:  Vitals:    09/09/19 2048   BP: 149/70   Pulse: 67   Resp: 16   Temp: 97.8  F (36.6  C)   SpO2: 94%   Weight: (!) 223 lb 6.4 oz (101.3 kg)       GENERAL: Awake, Alert, oriented x3, not in any form of acute distress, answers questions appropriately, follows simple commands, conversant  HEENT: Head is normocephalic with normal hair distribution. No evidence of trauma. Ears: No acute purulent discharge. Eyes: Conjunctivae pink with no scleral jaundice. Nose: Normal mucosa and septum. NECK: Supple with no cervical or supraclavicular lymphadenopathy. Trachea is midline.   CHEST: No tenderness or deformity, no crepitus  LUNG: Clear to auscultation with good poor chest expansion. There are no crackles or wheezes, normal AP diameter.  BACK: No kyphosis of the thoracic spine. Symmetric, no curvature, ROM normal, no CVA tenderness, no spinal tenderness   CVS: There is good S1  S2, there are no murmurs, rubs, gallops, or heaves, rhythm is regular,  2+ pulses symmetric in all extremities.  ABDOMEN: Globular and soft, nontender to palpation, non distended, no masses, no organomegaly, good bowel sounds, no rebound or guarding, no peritoneal signs.    EXTREMITIES:  Full range of motion on both upper and lower extremities, there is no tenderness to palpation, no pedal edema, no cyanosis or clubbing, no calf tenderness.  Pulses equal in all extremities, normal cap refill, no joint swelling.  SKIN: Warm and dry, no erythema noted.  Skin color, texture, no rashes or lesions.  NEUROLOGICAL: The patient is oriented to person, place and time. Strength and sensation are grossly intact. Face is symmetric.    LABS:      Lab Results   Component Value Date    WBC 7.0 08/12/2019    HGB 13.0 (L) 08/12/2019    HCT 44.5 08/12/2019    MCV 85 08/12/2019     08/12/2019     Results for orders placed or performed in visit on 09/03/19   Basic Metabolic Panel   Result Value Ref Range    Sodium 137 136 - 145 mmol/L    Potassium 4.5 3.5 - 5.0 mmol/L    Chloride 98 98 - 107 mmol/L    CO2 32 (H) 22 - 31 mmol/L    Anion Gap, Calculation 7 5 - 18 mmol/L    Glucose 74 70 - 125 mg/dL    Calcium 9.0 8.5 - 10.5 mg/dL    BUN 27 8 - 28 mg/dL    Creatinine 0.90 0.70 - 1.30 mg/dL    GFR MDRD Af Amer >60 >60 mL/min/1.73m2    GFR MDRD Non Af Amer >60 >60 mL/min/1.73m2     ASSESSMENT/PLAN:    1. Chronic respiratory failure, unspecified whether with hypoxia or hypercapnia (H) - Oxygen dependent   2. Chronic obstructive pulmonary disease, unspecified COPD type (H) - See above   3. Paroxysmal atrial fibrillation (H) - Anticoagulated with Eliquis   4. Congestive heart failure, unspecified HF chronicity, unspecified heart failure type (H) - No overt signs or symptoms of decompensation, continue Coreg    5. Biventricular ICD (implantable cardioverter-defibrillator) in place - Stable                     Electronically signed by:  Ronen Blackman CNP

## 2021-10-30 PROBLEM — N17.9 AKI (ACUTE KIDNEY INJURY) (H): Status: ACTIVE | Noted: 2021-01-01

## 2021-10-30 PROBLEM — D72.829 LEUKOCYTOSIS, UNSPECIFIED TYPE: Status: ACTIVE | Noted: 2021-01-01

## 2021-10-30 PROBLEM — F39 EPISODIC MOOD DISORDER (H): Status: ACTIVE | Noted: 2020-05-22

## 2021-10-30 PROBLEM — K62.89 PROCTITIS: Status: ACTIVE | Noted: 2021-01-01

## 2021-10-30 PROBLEM — D75.1 POLYCYTHEMIA: Status: ACTIVE | Noted: 2021-01-01

## 2021-10-30 PROBLEM — J18.9 PNEUMONIA OF BOTH LUNGS DUE TO INFECTIOUS ORGANISM, UNSPECIFIED PART OF LUNG: Status: ACTIVE | Noted: 2021-01-01

## 2021-10-30 PROBLEM — D47.1 MYELOPROLIFERATIVE DISORDER (H): Status: ACTIVE | Noted: 2021-01-01

## 2021-10-30 PROBLEM — G47.33 OSA (OBSTRUCTIVE SLEEP APNEA): Status: ACTIVE | Noted: 2021-01-01

## 2021-10-30 PROBLEM — R16.1 SPLENOMEGALY: Status: ACTIVE | Noted: 2021-01-01

## 2021-10-30 PROBLEM — J96.10 CHRONIC RESPIRATORY FAILURE (H): Status: ACTIVE | Noted: 2021-01-01

## 2021-10-30 PROBLEM — C61 PROSTATE CANCER (H): Status: ACTIVE | Noted: 2021-01-01

## 2021-10-30 NOTE — PLAN OF CARE
Problem: Gas Exchange Impaired  Goal: Optimal Gas Exchange  Outcome: Improving     Problem: Adult Inpatient Plan of Care  Goal: Optimal Comfort and Wellbeing  Outcome: Improving   Pt is tolerating continuous IV infusion well. Pt ordered prune juice with meal to aide constipation. RN will mix miralax with juice. Pt denies pain. Currently on 10 Lpm O2 via oxymask. Lung sounds diminished.   Jerrod Wade RN  10/30/2021

## 2021-10-30 NOTE — ED PROVIDER NOTES
EMERGENCY DEPARTMENT ENCOUNTER      FINAL IMPRESSION    1. Pneumonia of both lungs due to infectious organism, unspecified part of lung        MEDICAL DECISION MAKING    89-year-old gentleman with history of atrial fibrillation and CHF presented to the ED today with generalized malaise and fatigue.  Vitals reviewed to the triage are grossly reassuring peer examination is nonfocal neurologically.  No focal symptoms to suggest cardiopulmonary abdominal infectious illness.  Patient does have some mild nausea and vomiting.  No abdominal tenderness to palpation.  No significant respiratory symptoms of concern to suggest Covid or bacterial pneumonia.  Etiology concerning for an occult electrolyte or metabolic derangement versus an occult infectious illness.     IV established and broad lab work commenced including lactate and blood cultures.    Patient with significant leukocytosis of 25.  Lactic acid is normal cultures are pending.  UA with question of infection.     Chest x-ray shows bilateral pleural effusions but is otherwise reassuring.  Given the leukocytosis I did CT the abdomen pelvis which demonstrates findings questionable for proctitis as well as basilar infiltrate versus atelectasis.  Given the patient's O2 saturations have been in the low 90s and he is requiring 2 L though this is reportedly baseline will cover for potential pulmonary infectious source which should cover for abdominal pelvic infectious source as well..     Patient given gentle IV hydration with improvement.     Ultimately given the patient's frail elderly state with increasing weakness and inability to tolerate p.o. will hospitalize for antibiotics.    Due to contagious pathogen concern full protective equipment was utilized through the duration of the interaction including N95  mask, eye shield, hair cover, gown and gloves.     MEDICATIONS GIVEN IN THE EMERGENCY:  Medications   0.9% sodium chloride BOLUS (0 mLs Intravenous Stopped 10/30/21  "0114)   iopamidol (ISOVUE-370) solution 75 mL (75 mLs Intravenous Given 10/30/21 0013)       NEW PRESCRIPTIONS STARTED AT TODAY'S ER VISIT     Review of your medicines      UNREVIEWED medicines. Ask your doctor about these medicines      Dose / Directions   acetaminophen 500 MG tablet  Commonly known as: TYLENOL      Dose: 500-1,000 mg  [ACETAMINOPHEN (TYLENOL) 500 MG TABLET] Take 500-1,000 mg by mouth every 6 (six) hours as needed for pain.  Refills: 0     albuterol 108 (90 Base) MCG/ACT inhaler  Commonly known as: PROAIR HFA/PROVENTIL HFA/VENTOLIN HFA      Dose: 2 puff  [ALBUTEROL (PROAIR HFA;PROVENTIL HFA;VENTOLIN HFA) 90 MCG/ACTUATION INHALER] Inhale 2 puffs every 6 (six) hours as needed for wheezing.  Refills: 0     celecoxib 200 MG capsule  Commonly known as: celeBREX      Dose: 200 mg  [CELECOXIB (CELEBREX) 200 MG CAPSULE] Take 200 mg by mouth daily.  Refills: 0     hydroxyurea 500 MG capsule  Commonly known as: HYDREA      Dose: 500 mg  [HYDROXYUREA (HYDREA) 500 MG CAPSULE] Take 500 mg by mouth daily. Monday through Friday  Refills: 0     mirtazapine 7.5 MG tablet  Commonly known as: REMERON      Dose: 7.5 mg  [MIRTAZAPINE (REMERON) 7.5 MG TABLET] Take 7.5 mg by mouth at bedtime.  Refills: 0     senna-docusate 8.6-50 MG tablet  Commonly known as: SENOKOT-S/PERICOLACE  Used for: Slow transit constipation      Dose: 2 tablet  [SENNA-DOCUSATE (PERICOLACE) 8.6-50 MG TABLET] Take 2 tablets by mouth daily.  Refills: 0                CHIEF COMPLAINT    Chief Complaint   Patient presents with     Decreased Appetite         HPI    Efe Huertas is a 89 year old male with pertinent past medical history for atrial fibrillation, COPD,  Cardioverter/defibrillator, CHF, cardiomyopathy and obstructive airway disease who presents to this Emergency Department for evaluation of decreased appetite.     Patient reports endorsing \"whole body\" weakness for the past 2 months that has been progressively worsening. He has " "sustained 2 falls in the past month. He also endorses associated symptoms of nausea and vomiting with no blood present. He notes that he does have low grade fevers at times. He does use home oxygen regularly.     He denies headache, abdominal pain, chest pain, diarrhea, dysuria, cough or any other associated symptoms at this time. No other concerns were noted.      This document serves as a record of services performed by Dr. Dangelo Otero. It was created on his behalf by Yesika Thomas, trained medical scribe. The creation of this record is based on the scribes personal observations and the providers statements to him/her. This document has been checked and approved by the attending provider.    RELEVANT HISTORY, MEDICATIONS, & ALLERGIES   Past medical history, surgical history, family history, medications, and allergies reviewed and pertinent noted in HPI. See end of note for comprehensive list.    REVIEW OF SYSTEMS    Constitutional:  No fever or chills. Positive for weakness  ENT: No sore throat. No congestion  Eyes: No vision changes  Respiratory: No shortness of breath, no wheeze, no cough  Cardiovascular:  No chest pain, no palpitations, no syncope.  GI:  No abdominal pain, no diarrhea. Positive for nausea, vomiting,  : No dysuria, No hematuria, No frequency.  Musculoskeletal:  No new muscle/joint pain, no swelling, no loss of function.   Skin:  No rash.  Neurologic:  No headache, no focal weakness , no sensory changes    All other systems reviewed and are negative.    PHYSICAL EXAM    VITALS SIGNS: /58   Pulse 78   Temp 98.2  F (36.8  C) (Temporal)   Resp 18   Ht 1.88 m (6' 2\")   Wt 92.1 kg (203 lb)   SpO2 95%   BMI 26.06 kg/m    Constitutional:  Awake, Alert, Cooperative.  HENT: Normocephalic, Atraumatic, Bilateral external ears normal, Oropharynx moist, Nose normal.   Neck: Normal range of motion, No tenderness, Supple, No meningismus, No stridor.   Eyes: PERRL, EOMI, Conjunctiva normal, No " discharge.   Respiratory: coarse breath sounds, No respiratory distress, No wheezing, No chest tenderness.   Cardiovascular: Normal heart rate, Normal rhythm, No murmurs, No rubs, No gallops.   GI: Soft, No tenderness, No guarding, No CVA tenderness.   Musculoskeletal: Neurovascularly intact distally, No edema, No tenderness  Integument: Warm, Dry, No erythema, No rash.   Neurologic: Alert & oriented x 3, Normal motor function, Normal sensory function, No focal deficits noted. GCS15. Nose finger intact.    LABS  Labs Ordered and Resulted from Time of ED Arrival to Time of ED Departure   CBC WITH PLATELETS - Abnormal       Result Value    WBC Count 25.4 (*)     RBC Count 2.60 (*)     Hemoglobin 8.7 (*)     Hematocrit 29.5 (*)      (*)     MCH 33.5 (*)     MCHC 29.5 (*)     RDW 17.3 (*)     Platelet Count 820 (*)    BASIC METABOLIC PANEL - Abnormal    Sodium 141      Potassium 4.9      Chloride 103      Carbon Dioxide (CO2) 29      Anion Gap 9      Urea Nitrogen 36 (*)     Creatinine 1.61 (*)     Calcium 10.0      Glucose 98      GFR Estimate 36 (*)    ROUTINE UA WITH MICROSCOPIC REFLEX TO CULTURE - Abnormal    Color Urine Yellow      Appearance Urine Turbid (*)     Glucose Urine Negative      Bilirubin Urine Negative      Ketones Urine Negative      Specific Gravity Urine 1.020      Blood Urine Negative      pH Urine 5.0      Protein Albumin Urine 30  (*)     Urobilinogen Urine <2.0      Nitrite Urine Negative      Leukocyte Esterase Urine Negative      Bacteria Urine Few (*)     RBC Urine 4 (*)     WBC Urine 19 (*)     Squamous Epithelials Urine 1      Hyaline Casts Urine 6 (*)    HEPATIC FUNCTION PANEL - Abnormal    Bilirubin Total 0.6      Bilirubin Direct 0.2      Protein Total 6.4      Albumin 3.9      Alkaline Phosphatase 122 (*)     AST 19      ALT <9     LACTIC ACID WHOLE BLOOD - Normal    Lactic Acid 1.3     TROPONIN I - Normal    Troponin I 0.05     COVID-19 VIRUS (CORONAVIRUS) BY PCR - Normal     SARS CoV2 PCR Negative     BLOOD CULTURE   BLOOD CULTURE   URINE CULTURE         EKG    Sinus rhythm rate 72, , QTc 503.  Nonspecific findings no active acute ischemic evidence.  Appears to be a paced rhythm.    RADIOLOGY    Please see official radiology report.  CT Abdomen Pelvis w Contrast   Final Result   IMPRESSION:    1.  Bibasilar atelectasis or infiltrate and small pleural effusions.   2.  Splenomegaly.   3.  The wall thickening not excluded. Correlate for proctitis.   4.  Diverticulosis.   5.  Remainder similar to previous.      Chest XR,  PA & LAT   Final Result   IMPRESSION: Heart size prominent on this AP view but unchanged. Pulmonary vascularity normal. Subsegmental atelectasis or scarring in the bases. Small bilateral pleural effusions posteriorly in the costophrenic angles. Upper lung fields are clear.    Pacemaker lead tips right atrium and right ventricle. Clips upper abdomen.            All laboratories, imaging and EKG's were personally reviewed and interpreted by myself prior to disposition decision.  Compared EKG from August 2019 paced rhythm persists and QT remains prolonged likely due to paced rhythm    PROCEDURES    None    Comprehensive outline of EPIC chart Hx  PAST MEDICAL HISTORY    History reviewed. No pertinent past medical history.  History reviewed. No pertinent surgical history.    CURRENT MEDICATIONS    No current facility-administered medications for this encounter.    Current Outpatient Medications:      acetaminophen (TYLENOL) 500 MG tablet, [ACETAMINOPHEN (TYLENOL) 500 MG TABLET] Take 500-1,000 mg by mouth every 6 (six) hours as needed for pain., Disp: , Rfl:      albuterol (PROAIR HFA;PROVENTIL HFA;VENTOLIN HFA) 90 mcg/actuation inhaler, [ALBUTEROL (PROAIR HFA;PROVENTIL HFA;VENTOLIN HFA) 90 MCG/ACTUATION INHALER] Inhale 2 puffs every 6 (six) hours as needed for wheezing., Disp: , Rfl:      celecoxib (CELEBREX) 200 MG capsule, [CELECOXIB (CELEBREX) 200 MG CAPSULE] Take 200  mg by mouth daily., Disp: , Rfl:      hydroxyurea (HYDREA) 500 mg capsule, [HYDROXYUREA (HYDREA) 500 MG CAPSULE] Take 500 mg by mouth daily. Monday through Friday, Disp: , Rfl:      mirtazapine (REMERON) 7.5 MG tablet, [MIRTAZAPINE (REMERON) 7.5 MG TABLET] Take 7.5 mg by mouth at bedtime., Disp: , Rfl:      senna-docusate (PERICOLACE) 8.6-50 mg tablet, [SENNA-DOCUSATE (PERICOLACE) 8.6-50 MG TABLET] Take 2 tablets by mouth daily., Disp: , Rfl: 0    ALLERGIES    Allergies   Allergen Reactions     Blood-Group Specific Substance Other (See Comments)     Patient has an anti-C.  Blood product orders may be delayed.  Please draw one red top tube and two lavender top tubes for all Type and Screens/ Type and Crossmatch orders.      Thiopental Nausea and Vomiting       FAMILY HISTORY    History reviewed. No pertinent family history.    SOCIAL HISTORY    Social History     Socioeconomic History     Marital status:      Spouse name: Not on file     Number of children: Not on file     Years of education: Not on file     Highest education level: Not on file   Occupational History     Not on file   Tobacco Use     Smoking status: Never Smoker     Smokeless tobacco: Never Used   Substance and Sexual Activity     Alcohol use: Yes     Drug use: Never     Sexual activity: Not on file   Other Topics Concern     Not on file   Social History Narrative     Not on file     Social Determinants of Health     Financial Resource Strain:      Difficulty of Paying Living Expenses:    Food Insecurity:      Worried About Running Out of Food in the Last Year:      Ran Out of Food in the Last Year:    Transportation Needs:      Lack of Transportation (Medical):      Lack of Transportation (Non-Medical):    Physical Activity:      Days of Exercise per Week:      Minutes of Exercise per Session:    Stress:      Feeling of Stress :    Social Connections:      Frequency of Communication with Friends and Family:      Frequency of Social Gatherings  with Friends and Family:      Attends Restorationist Services:      Active Member of Clubs or Organizations:      Attends Club or Organization Meetings:      Marital Status:    Intimate Partner Violence:      Fear of Current or Ex-Partner:      Emotionally Abused:      Physically Abused:      Sexually Abused:        This document serves as a record of services performed by Dr. Dangelo Otero. It was created on his behalf by Yesika Thomas, trained medical scribe. The creation of this record is based on the scribes personal observations and the providers statements to him/her.     I Dr. Dangelo Otero, personally performed the services described in this documentation as scribed by the above named individual in my presence, and it is both accurate and complete.      Dangelo Otero MD  10/30/21 0138

## 2021-10-30 NOTE — PHARMACY-ADMISSION MEDICATION HISTORY
Pharmacy Note - Admission Medication History    Pertinent Provider Information: Patient's son helps with medications at home     ______________________________________________________________________    Prior To Admission (PTA) med list completed and updated in EMR.       PTA Med List   Medication Sig Last Dose     acetaminophen (TYLENOL) 500 MG tablet [ACETAMINOPHEN (TYLENOL) 500 MG TABLET] Take 500-1,000 mg by mouth every 6 (six) hours as needed for pain. Past Week at Unknown time     albuterol (PROAIR HFA;PROVENTIL HFA;VENTOLIN HFA) 90 mcg/actuation inhaler [ALBUTEROL (PROAIR HFA;PROVENTIL HFA;VENTOLIN HFA) 90 MCG/ACTUATION INHALER] Inhale 2 puffs every 6 (six) hours as needed for wheezing. More than a month at Unknown time     celecoxib (CELEBREX) 200 MG capsule Take 200 mg by mouth 2 times daily as needed  10/29/2021 at am     hydroxyurea (HYDREA) 500 mg capsule [HYDROXYUREA (HYDREA) 500 MG CAPSULE] Take 500 mg by mouth daily. Monday through Friday 10/29/2021 at am     mirtazapine (REMERON) 7.5 MG tablet Take 3.25 mg by mouth At Bedtime  Past Week at pm     senna-docusate (SENOKOT-S/PERICOLACE) 8.6-50 MG tablet Take 1 tablet by mouth daily 10/29/2021 at am     vitamin C (ASCORBIC ACID) 1000 MG TABS Take 1,000 mg by mouth daily 10/29/2021 at am     Vitamin D3 (CHOLECALCIFEROL) 125 MCG (5000 UT) tablet Take 125 mcg by mouth daily 10/29/2021 at am       Information source(s): Patient and CareEverywhere/SureScripts  Method of interview communication: phone    Summary of Changes to PTA Med List  New: vitamin D, vitamin C  Discontinued: none  Changed: mirtazapine (from 7.5mg at bedtime to 3.25mg at bedtime)    Patient was asked about OTC/herbal products specifically.  PTA med list reflects this.    In the past week, patient estimated taking medication this percent of the time:  greater than 90%.    Allergies were reviewed, assessed, and updated with the patient.      Patient did not bring any medications to the  hospital and can't retrieve from home. No multi-dose medications are available for use during hospital stay.     The information provided in this note is only as accurate as the sources available at the time of the update(s).    Thank you for the opportunity to participate in the care of this patient.    Charu Vences  10/30/2021 9:12 AM

## 2021-10-30 NOTE — H&P
Admission History and Physical   Efe Huertas, 6/9/1928, [id number]  [facility]  Pneumonia of both lungs due to infectious organism, unspecified part of lung [J18.9]  PCP:Gabriel Weems, None   Admitting provider: Fernanda Dempsey MD.    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 Patient  Does complain of straining for BMs lately but no blood. Afebrile   - change abx to IV flagyl and cipro   - IS   - continue home inhaler   - Gi to see for proctitis if anything further to add   - procalcitonin and lactic normal      Myeloproliferative disorder...Polycythemia do not have Heme notes and appears elevated WBC is chronic ?? H/o splenomegaly, polycythemia DEBRA 2+ requiring intermittent phlebotomy and Hydrea  - trend  - platelets elevated and will monitor  - continue Hydroxyurea   - Heme consult is followed by Dr. Katelyn LAWTON  - IVF  - trend renal function   - hold celebrex      COPD with chronic respiratory failure not in exacerbation, JANETH on cpap   -  "continue supp O2  - home CPAP  - alb MDI     H/o Afib   - has ICD in place  - not anticoagulated due to h/o multiple falls in the past per cardiology note.         COVID STATUS: Negative Date:10/30, Not vaccinated for covid or influenza        VTE prophylaxis:  Heparin SQ  DIET: Orders Placed This Encounter      Combination Diet Full Liquid    Drains/Lines: none  Weight bearing status: WBAT  Disposition/Barriers to discharge: Pending therapy and improvement   Code Status:No CPR- Pre-arrest intubation OK    HPI: Efe Huertas is a 89 year old old male with h/o atrial fibrillation, COPD,  Cardioverter/defibrillator, CHF, cardiomyopathy and obstructive airway disease who presents to this Emergency Department for evaluation of decreased appetite.      Patient reports endorsing \"whole body\" weakness for the past 2 months that has been progressively worsening. He has sustained 2 falls in the past month. He also endorses associated symptoms of nausea and vomiting with no blood present. He notes that he does have low grade fevers at times. He does also complain for dizziness, fatigues, poor appetite, abdominal discomfort/chest discomfort but can not describe and constipation.   Denies Orthopnea, PND but does have chronic SOB.      He denies headache,diarrhea, dysuria, cough or any other associated symptoms at this time.    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     Congestive heart failure (H) 5/17/2007    Overview:  Systolic with EF 30-35% on ECHO 5/17/2007      COPD, group B, 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 (H) 2/21/2013     Dizziness 8/12/2019     Episodic mood disorder (H) 5/22/2020     Left foot drop 6/12/2009     NICM (nonischemic cardiomyopathy) (H) 3/26/2009     Obstructive airway disease (H) " 2013     JANETH (obstructive sleep apnea) 10/30/2021     Polycythemia 10/30/2021     Prostate cancer (H) 10/30/2021     Splenomegaly 10/30/2021     Ventricular bigeminy          Past Surgical History:   Procedure Laterality Date     ABDOMINAL AORTIC ANEURYSM REPAIR       AS REPAIR 1 COLLAT ANKLE LIGMNT,PRIMARY       CHOLECYSTECTOMY       COLECTOMY WITH COLOSTOMY, COMBINED       EP ICD / PACEMAKER / LOOP RECORDER      Pacemaker placement     HERNIA REPAIR       HRW VEIN STRIPPER       IMPLANTED DEVICE       MS ANESTH,REMOVAL OF ADRENAL       PROSTATECTOMY       REPAIR BLADDER       Family History   Family history unknown: Yes   Family history noncontributory     Social History     Socioeconomic History     Marital status:      Spouse name: Not on file     Number of children: Not on file     Years of education: Not on file     Highest education level: Not on file   Occupational History     Not on file   Tobacco Use     Smoking status: Former Smoker     Packs/day: 1.00     Years: 56.00     Pack years: 56.00     Types: Cigarettes     Quit date:      Years since quittin.8     Smokeless tobacco: Never Used   Substance and Sexual Activity     Alcohol use: Not Currently     Drug use: Never     Sexual activity: Not on file   Other Topics Concern     Not on file   Social History Narrative     Not on file     Social Determinants of Health     Financial Resource Strain:      Difficulty of Paying Living Expenses:    Food Insecurity:      Worried About Running Out of Food in the Last Year:      Ran Out of Food in the Last Year:    Transportation Needs:      Lack of Transportation (Medical):      Lack of Transportation (Non-Medical):    Physical Activity:      Days of Exercise per Week:      Minutes of Exercise per Session:    Stress:      Feeling of Stress :    Social Connections:      Frequency of Communication with Friends and Family:      Frequency of Social Gatherings with Friends and Family:      Attends  Muslim Services:      Active Member of Clubs or Organizations:      Attends Club or Organization Meetings:      Marital Status:    Intimate Partner Violence:      Fear of Current or Ex-Partner:      Emotionally Abused:      Physically Abused:      Sexually Abused:      Allergies   Allergen Reactions     Blood-Group Specific Substance Other (See Comments)     Patient has an anti-C.  Blood product orders may be delayed.  Please draw one red top tube and two lavender top tubes for all Type and Screens/ Type and Crossmatch orders.      Thiopental Nausea and Vomiting       PRIOR TO ADMISSION MEDICATIONS   (Not in a hospital admission)       REVIEW OF SYSTEMS:  12 point reviewed pertinent negatives and positives in HPI all others negative     PHYSICAL EXAM  Temp:  [98.2  F (36.8  C)] 98.2  F (36.8  C)  Pulse:  [74-91] 83  Resp:  [16-38] 24  BP: (121-138)/(58-92) 121/92  SpO2:  [91 %-98 %] 91 %  Wt Readings from Last 1 Encounters:   10/29/21 92.1 kg (203 lb)     Body mass index is 26.06 kg/m .  Physical Exam  Constitutional:       General: He is not in acute distress.     Appearance: He is ill-appearing. He is not toxic-appearing.   HENT:      Head: Normocephalic and atraumatic.      Mouth/Throat:      Mouth: Mucous membranes are dry.      Pharynx: Oropharynx is clear.   Eyes:      Extraocular Movements: Extraocular movements intact.      Conjunctiva/sclera: Conjunctivae normal.      Pupils: Pupils are equal, round, and reactive to light.      Comments: Arcus senilis    Neck:      Vascular: No carotid bruit.   Cardiovascular:      Rate and Rhythm: Normal rate and regular rhythm.      Pulses: Normal pulses.      Comments: Soft EDWARD, no R/G  PM in place  Pulmonary:      Effort: Pulmonary effort is normal.      Breath sounds: Normal breath sounds.      Comments: No Wheezes, rales or rhonchi  nonlabored breathing   Abdominal:      General: Bowel sounds are normal.      Palpations: Abdomen is soft.   Musculoskeletal:          General: Normal range of motion.      Cervical back: Normal range of motion. No rigidity.      Comments: Pretibial edema    Skin:     General: Skin is warm and dry.      Capillary Refill: Capillary refill takes less than 2 seconds.      Coloration: Skin is pale.   Neurological:      General: No focal deficit present.      Mental Status: He is alert and oriented to person, place, and time. Mental status is at baseline.      Comments: Diffuse weakness   Psychiatric:         Mood and Affect: Mood normal.         Behavior: Behavior normal.           PERTINENT LABS and RADIOLOGY   Results for orders placed or performed during the hospital encounter of 10/29/21   Chest XR,  PA & LAT    Impression    IMPRESSION: Heart size prominent on this AP view but unchanged. Pulmonary vascularity normal. Subsegmental atelectasis or scarring in the bases. Small bilateral pleural effusions posteriorly in the costophrenic angles. Upper lung fields are clear.   Pacemaker lead tips right atrium and right ventricle. Clips upper abdomen.   CT Abdomen Pelvis w Contrast    Impression    IMPRESSION:   1.  Bibasilar atelectasis or infiltrate and small pleural effusions.  2.  Splenomegaly.  3.  The wall thickening not excluded. Correlate for proctitis.  4.  Diverticulosis.  5.  Remainder similar to previous.     Most Recent 3 CBC's:  Recent Labs   Lab Test 10/30/21  0838 10/29/21  2310 05/19/21  0639   WBC 29.0* 25.4* 18.8*   HGB 8.8* 8.7* 9.4*   * 114* 112*   * 820* 700*     Most Recent 3 BMP's:  Recent Labs   Lab Test 10/30/21  0838 10/29/21  2310 05/19/21  0639    141 138   POTASSIUM 4.9 4.9 4.8   CHLORIDE 106 103 99   CO2 30 29 33*   BUN 31* 36* 16   CR 1.40* 1.61* 0.78   ANIONGAP 6 9 6   CIRA 9.4 10.0 8.6   GLC 87 98 90     Most Recent 2 LFT's:  Recent Labs   Lab Test 10/29/21  2310   AST 19   ALT <9   ALKPHOS 122*   BILITOTAL 0.6     Most Recent Urinalysis:  Recent Labs   Lab Test 10/30/21  0009   COLOR Yellow    APPEARANCE Turbid*   URINEGLC Negative   URINEBILI Negative   URINEKETONE Negative   SG 1.020   UBLD Negative   URINEPH 5.0   PROTEIN 30 *   NITRITE Negative   LEUKEST Negative   RBCU 4*   WBCU 19*     EKG:sinus rhythm with intraventricular block      Fernanda Dempsey MD  [facility]  [phone]

## 2021-10-30 NOTE — ED NOTES
Daughter, Iliana, updated with pt's status for admission and staying in the ER for the night.

## 2021-10-30 NOTE — CONSULTS
"Care Management Initial Consult    General Information  Assessment completed with: Iliana Navas daughter via phone  Type of CM/SW Visit: Initial Assessment    Primary Care Provider verified and updated as needed: Yes   Readmission within the last 30 days: no previous admission in last 30 days      Reason for Consult: discharge planning  Advance Care Planning: Advance Care Planning Reviewed: other (comment) (\"doesn't have\")          Communication Assessment  Patient's communication style: spoken language (English or Bilingual)             Cognitive  Cognitive/Neuro/Behavioral: WDL                      Living Environment:   People in home: child(horace), adult (\"with son Fabian\")  Fabian  Current living Arrangements: house      Able to return to prior arrangements: no (needs TCU)       Family/Social Support:  Care provided by: child(horace) (son)  Provides care for: no one, unable/limited ability to care for self     Children          Description of Support System: Supportive, Involved    Support Assessment: Adequate family and caregiver support, Adequate social supports    Current Resources:   Patient receiving home care services: No (\"son cares for his father, but is not his PCA\")     Community Resources: DME (\"unknown company for O2 delivery\")  Equipment currently used at home: walker, standard (\"no seat on his walker\")  Supplies currently used at home: Incontinence Supplies, Oxygen Tubing/Supplies, Other (\"Continuous O2, CPAP, dentures, reading glasses\")    Employment/Financial:  Employment Status: retired     Employment/ Comments: \"no  benefits\"  Financial Concerns:     Referral to Financial Counselor: No       Lifestyle & Psychosocial Needs:  Social Determinants of Health     Tobacco Use: Medium Risk     Smoking Tobacco Use: Former Smoker     Smokeless Tobacco Use: Never Used   Alcohol Use:      Frequency of Alcohol Consumption:      Average Number of Drinks:      Frequency of Binge " "Drinking:    Financial Resource Strain:      Difficulty of Paying Living Expenses:    Food Insecurity:      Worried About Running Out of Food in the Last Year:      Ran Out of Food in the Last Year:    Transportation Needs:      Lack of Transportation (Medical):      Lack of Transportation (Non-Medical):    Physical Activity:      Days of Exercise per Week:      Minutes of Exercise per Session:    Stress:      Feeling of Stress :    Social Connections:      Frequency of Communication with Friends and Family:      Frequency of Social Gatherings with Friends and Family:      Attends Mandaen Services:      Active Member of Clubs or Organizations:      Attends Club or Organization Meetings:      Marital Status:    Intimate Partner Violence:      Fear of Current or Ex-Partner:      Emotionally Abused:      Physically Abused:      Sexually Abused:    Depression:      PHQ-2 Score:    Housing Stability:      Unable to Pay for Housing in the Last Year:      Number of Places Lived in the Last Year:      Unstable Housing in the Last Year:        Functional Status:  Prior to admission patient needed assistance:   Dependent ADLs:: Ambulation-walker, Bathing, Dressing, Grooming, Incontinence (\"we don't have a wheelchair but really need one\")  Dependent IADLs:: Cleaning, Cooking, Laundry, Shopping, Meal Preparation, Medication Management, Money Management, Transportation, Incontinence  Assesssment of Functional Status: Not at baseline with ADL Functioning, Not at baseline with mobility, Not at  functional baseline    Mental Health Status:          Chemical Dependency Status:                Values/Beliefs:  Spiritual, Cultural Beliefs, Mandaen Practices, Values that affect care:                 Additional Information:  Efe lives in a house with his son Fabian. Fabian is his primary care giver, \"but he has been declining quickly over the past few months and now Fabian is really not able to meet his needs at home. He is not " "even able to walk now.\"    He uses continuous oxygen at home.    He needs a TCU at discharge. Family is aware many places are full but their first choice for TCU is [facility], 2nd choice is [facility], and 3rd choice is the [facility].    Iliana daughter [phone].    Family to transport at discharge.    Rosa Tapia RN      "

## 2021-10-30 NOTE — ED NOTES
Patient incontinent of urine while attempting to self use urinal.  Full bed change and given 1/2 bath.

## 2021-10-30 NOTE — PLAN OF CARE
Problem: Oral Intake Inadequate COPD (Chronic Obstructive Pulmonary Disease)  Goal: Improved Nutrition Intake  Outcome: No Change     Problem: Adult Inpatient Plan of Care  Goal: Optimal Comfort and Wellbeing  10/30/2021 1543 by Jerrod Wade, RN  Outcome: Improving  10/30/2021 1543 by Jerrod Wade, RN  Outcome: Improving  10/30/2021 1539 by Jerrod Wade, RN  Outcome: Improving   Pt O2 remained stable as RN weaned Pt to 6 Lpm from 10 Lpm. Pt had an echo completed. Pt has limited appetite. Pt is tolerating IV cipro and flagyl well. Pt reported discomfort laying in ED beds. RN repositioned Pt with pillows to reduce risk of skin breakdown on pressure points.   No other concerns noted.   Jerrod Wade RN  10/30/2021  3:46 PM

## 2021-10-30 NOTE — ED TRIAGE NOTES
Pt presents via [facility] for decreased appetite. Pt reports having  midabdominal pain with started 2 days ago. Pt endorses having decreased appetite for food and fluid last several months. Denies vomiting. Pt reports feeling weaker. Pt lives at home with son. Expecting daughter to come to ER. Pt typically wears 2L of O2 at home.    EMS reported some pills are getting stopped, but pt unsure why or what.    Pt is [place]    Per son, pt has polycythemia and struggling with med adjustment. Pt has been struggling with PO intake.

## 2021-10-31 PROBLEM — J96.21 ACUTE ON CHRONIC RESPIRATORY FAILURE WITH HYPOXIA (H): Status: ACTIVE | Noted: 2021-01-01

## 2021-10-31 NOTE — PROGRESS NOTES
10/31/21 0830   Quick Adds   Type of Visit Initial PT Evaluation   Living Environment   People in home child(horace), adult   Current Living Arrangements house   Living Environment Comments daughter reports it is too hard to care for patient at home   Self-Care   Usual Activity Tolerance fair  (declining past few weeks, son has been helping)   Current Activity Tolerance poor   Equipment Currently Used at Home walker, rolling;cane, straight  (have lift recliner chair, looking at getting wheelchair)   General Information   Onset of Illness/Injury or Date of Surgery 10/29/21   Referring Physician Fernanda Dempsey MD   Cognition   Affect/Mental Status (Cognition) flat/blunted affect   Follows Commands (Cognition) follows one-step commands   Cognitive Status Comments noted slow to answer questions, possible facial droop although smile appeared equal and able to stick tongue straight out. patient and visitor report difficulty swallowing/nausea when eating- recommend speech/swallow eval.   Posture    Posture Comments leaning right when sitting EOB and when sitting in recliner chair- notified RN   Strength   Strength Comments grossly decreased   Bed Mobility   Impairments Contributing to Impaired Bed Mobility impaired balance;impaired motor control;decreased strength   Assistive Device (Bed Mobility) bed rails   Comment (Bed Mobility) max-mod A for bed mobility and poor sitting balance EOB requiring tactile cues for hand placement and min-CGA    Clinical Impression   Criteria for Skilled Therapeutic Intervention yes, treatment indicated   PT Diagnosis (PT) impaired functional mobility   Influenced by the following impairments transfers, gait   Functional limitations due to impairments weakness   Clinical Presentation Stable/Uncomplicated   Clinical Presentation Rationale patient presents as medically diagnosed   Clinical Decision Making (Complexity) moderate complexity   Therapy Frequency (PT) Daily   Predicted Duration  of Therapy Intervention (days/wks) 7 days   Planned Therapy Interventions (PT) transfer training;bed mobility training;gait training   Anticipated Equipment Needs at Discharge (PT) walker, rolling;wheelchair   Risk & Benefits of therapy have been explained patient   Clinical Impression Comments supportive family   PT Discharge Planning    PT Discharge Recommendation (DC Rec) Transitional Care Facility;Long term care facility   PT Rationale for DC Rec Recommend TCU as best option. Heavy assist of 2 people for transfers. If patient had to return home would need to ramp the stairs and get a mechanical lift at this point. Recommend TCU to regain strength and then may need Long term care or senior living pending progress.   Total Evaluation Time   Total Evaluation Time (Minutes) 15

## 2021-10-31 NOTE — PROVIDER NOTIFICATION
No void since 0530 this AM.  Pt bladder scanned for 54cc.  Lungs clear.  Informed Hospitalist and fluids ordered.  Increased weakness and garbled speech over past 2 weeks per dtr.  Therapy noted a right side lean with transfer.  Pt confused to month.  Responses to questions were confused.   Dtr states he is a late sleeper and  response might be result of being tired.  Also, unsure if he was hearing correctly.  Getting Pocket Talker.  Head CT ordered.  He is more appropriate this afternoon.  Swallowing diffiicluty noted.  This also has been going on over past weeks per family.  Speech consult placed this AM.  Message left for them to see.

## 2021-10-31 NOTE — PROGRESS NOTES
CONSULTING PHYSICIAN   Fernanda Dempsey MD     REASON FOR CONSULTATION   dysphagia     CHIEF COMPLAINT   Efe Huertas came to the hospital for evaluation of weakenss     HISTORY OF PRESENT ILLNESS   Efe Huertas is a 89 year old male with Jak2 myeloproliferative disorder, COPD on 2 L oxygen, CHF with defibrillator, JANETH, hx prostate CA admitted with generalized weakness and failure to thrive with 10-15 lb weight loss  GI asked to assess dysphagia    Patient notes several months of dysphagia. He notes this occurs in mid chest and he frequently has to regurgitate what he swallowed. This can be solids, pills, liquids  He notes heartburn  Not on PPI at home     Former smoker    Last EGD 2008. Last colonoscopy at that time too     Admitted with pneumonia. On 6L O2. 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     Congestive heart failure (H) 5/17/2007    Overview:  Systolic with EF 30-35% on ECHO 5/17/2007      COPD, group B, 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 (H) 2/21/2013     Dizziness 8/12/2019     Episodic mood disorder (H) 5/22/2020     Left foot drop 6/12/2009     NICM (nonischemic cardiomyopathy) (H) 3/26/2009     Obstructive airway disease (H) 2/21/2013     JANETH (obstructive sleep apnea) 10/30/2021     Polycythemia 10/30/2021     Prostate cancer (H) 10/30/2021     Splenomegaly 10/30/2021     Ventricular bigeminy       Past Surgical History:   Procedure Laterality Date     ABDOMINAL AORTIC ANEURYSM REPAIR       AS REPAIR 1 COLLAT ANKLE LIGMNT,PRIMARY       CHOLECYSTECTOMY       COLECTOMY WITH COLOSTOMY, COMBINED       EP ICD / PACEMAKER / LOOP RECORDER      Pacemaker placement      HERNIA REPAIR       HRW VEIN STRIPPER       IMPLANTED DEVICE       CT ANESTH,REMOVAL OF ADRENAL       PROSTATECTOMY       REPAIR BLADDER       TAKEDOWN COLOSTOMY          Family History Social History   Family History   Family history unknown: Yes    Social History     Tobacco Use     Smoking status: Former Smoker     Packs/day: 1.00     Years: 56.00     Pack years: 56.00     Types: Cigarettes     Quit date:      Years since quittin.8     Smokeless tobacco: Never Used   Substance Use Topics     Alcohol use: Not Currently     Drug use: Never        MEDICATIONS & ALLERGIES   Medications Prior to Admission   Medication Sig Dispense Refill Last Dose     acetaminophen (TYLENOL) 500 MG tablet [ACETAMINOPHEN (TYLENOL) 500 MG TABLET] Take 500-1,000 mg by mouth every 6 (six) hours as needed for pain.   Past Week at Unknown time     albuterol (PROAIR HFA;PROVENTIL HFA;VENTOLIN HFA) 90 mcg/actuation inhaler [ALBUTEROL (PROAIR HFA;PROVENTIL HFA;VENTOLIN HFA) 90 MCG/ACTUATION INHALER] Inhale 2 puffs every 6 (six) hours as needed for wheezing.   More than a month at Unknown time     celecoxib (CELEBREX) 200 MG capsule Take 200 mg by mouth 2 times daily as needed    10/29/2021 at am     hydroxyurea (HYDREA) 500 mg capsule [HYDROXYUREA (HYDREA) 500 MG CAPSULE] Take 500 mg by mouth daily. Monday through Friday   10/29/2021 at am     mirtazapine (REMERON) 7.5 MG tablet Take 3.75 mg by mouth At Bedtime    Past Week at pm     senna-docusate (SENOKOT-S/PERICOLACE) 8.6-50 MG tablet Take 1 tablet by mouth daily   10/29/2021 at am     vitamin C (ASCORBIC ACID) 1000 MG TABS Take 1,000 mg by mouth daily   10/29/2021 at am     Vitamin D3 (CHOLECALCIFEROL) 125 MCG (5000 UT) tablet Take 125 mcg by mouth daily   10/29/2021 at am        ALLERGIES   Allergies   Allergen Reactions     Blood-Group Specific Substance Other (See Comments)     Patient has an anti-C.  Blood product orders may be delayed.  Please draw one red top tube and  "two lavender top tubes for all Type and Screens/ Type and Crossmatch orders.      Thiopental Nausea and Vomiting         REVIEW OF SYSTEMS   A comprehensive review of systems was performed and was otherwise noncontributory.     OBJECTIVE   Vitals Blood pressure 104/52, pulse 70, temperature 98.5  F (36.9  C), temperature source Oral, resp. rate 24, height 1.88 m (6' 2\"), weight 92.1 kg (203 lb), SpO2 90 %.           Physical  Exam   GENERAL: alert and oriented, mild distress from pulm status    SKIN: warm and dry, no rashes    HEENT: atraumatic, anicteric, moist mucous membranes, neck soft/supple     PULMONARY: poor air entry. On 6L NC. Frequent desaturation to the 80s     CARDIOVASCULAR: normal rate and rhythm, systolic murmur, II/VI. Defibrillator in place    ABDOMEN: no tenderness, no distention, bowel sounds normal    MUSCULOSKELETAL: joints and gait normal    NEUROLOGICAL: appropriate mental status, grossly intact    PSYCHIATRIC: normal mood, affect and insight        LABORATORY    ELECTROLYTE PANEL   Recent Labs   Lab 10/31/21  0413 10/30/21  0838 10/29/21  2310    142 141   POTASSIUM 4.8 4.9 4.9   CHLORIDE 107 106 103   CO2 29 30 29   GLC 92 87 98   CR 1.07 1.40* 1.61*   BUN 24 31* 36*      HEMATOLOGY PANEL   Recent Labs   Lab 10/31/21  0413 10/30/21  0838 10/29/21  2310   HGB 8.2* 8.8* 8.7*   * 115* 114*   WBC 26.5* 29.0* 25.4*   * 781* 820*      LIVER AND PANCREAS PANEL   Recent Labs   Lab 10/29/21  2310   AST 19   ALT <9   ALKPHOS 122*   BILITOTAL 0.6     IMAGING STUDIES        I have reviewed the current diagnostic and laboratory tests.           IMPRESSION   Efe Huertas is a 89 year old male with 89 year old male with Jak2 myeloproliferative disorder, COPD on 2 L oxygen, CHF with defibrillator, JANETH, hx prostate CA admitted with generalized weakness and failure to thrive with 10-15 lb weight loss    Admitted with pneumonia, on ABX    Dysphagia- to liquids/solids. DIfferential " includes dysmotility, stricture, malignancy, diverticulum.   He would benefit from EGD but his pulmonary status is too decompensated at this time. He would need to be intubated if endoscopy is to be performed.     GI will follow. Once improved, will plan EGD in OR    Rectal wall thickening- questionable on CT. Can plan flex sig when doing EGD              Gus Cason MD  Thank you for the opportunity to participate in the care of this patient.   Please feel free to call me with any questions or concerns.  Phone number [phone].

## 2021-10-31 NOTE — PLAN OF CARE
Pt admitted to p3 from boarding down in the ED. Patient is alert and oriented x4, very hard of hearing. Complains of SOB with exertion, even turning drops O2. Patient is on 5L nasal cannula. When patient lays on right side his o2 sats stay above 90% but when on left side patients o2 sats do not come above 88%.     Patients daughter described that his ADLs have descreased significantly within the past 3 months. Patient is having hard time swallowing.     NS running at 100ml/hr. Full liquid diet. Denied pain

## 2021-10-31 NOTE — PROGRESS NOTES
"   10/31/21 1455   General Information   Onset of Illness/Injury or Date of Surgery 10/29/21   Referring Physician Ke   Patient/Family Therapy Goal Statement (SLP) feel better; tolerating eating   Pertinent History of Current Problem Per MD note: \"Efe Huertas is a 89 year old old male with h/o multiple complaints. Possible PNA but no change in respiratory status or increased O2 need and CT shows infiltrate vs fluid vs atelectasis but also question of proctitis. GI would like EGD but unable to perform with O2 needs at this time. COPD with chronic respiratory failure not in exacerbation, JANETH on cpap/ acute on chronic resp failure. On 2-3 liters at home now 4-5 liters and very weak. Possible aspiration. SLP to see and VSS if needed.\" CT Abdomen results included: Bibasilar atelectasis or infiltrate and small pleural effusions. Diverticulosis. Per GI note: \"89 year old male with Jak2 myeloproliferative disorder, COPD on 2 L oxygen, CHF with defibrillator, JANETH, hx prostate CA admitted with generalized weakness and failure to thrive with 10-15 lb weight loss. Admitted with pneumonia, on ABX. Dysphagia- to liquids/solids. DIfferential includes dysmotility, stricture, malignancy, diverticulum.\"   Past History of Dysphagia especially reports solids sticking in chest area or lower   Type of Evaluation   Type of Evaluation Swallow Evaluation   Oral Motor   Oral Musculature generally intact   Structural Abnormalities none present   Mucosal Quality good   Dentition (Oral Motor)   Dentition (Oral Motor) dental appliance/dentures   Dental Appliance/Denture (Oral Motor) upper;lower;other (see comments)  (loose, he regularly uses denture paste and not present)   General Swallowing Observations   Current Diet/Method of Nutritional Intake (General Swallowing Observations, NIS) full liquid diet   Respiratory Support (General Swallowing Observations) supplemental oxygen;nasal cannula   Swallowing Evaluation Clinical swallow " evaluation   Clinical Swallow Evaluation   Feeding Assistance frequent cues/help required   Clinical Swallow Evaluation Textures Trialed thin liquids;pureed  (didn't trial textures due to complaints/concerns)   Clinical Swallow Eval: Thin Liquid Texture Trial   Mode of Presentation, Thin Liquids straw;self-fed;fed by clinician   Volume of Liquid or Food Presented 2 oz   Oral Phase of Swallow WFL   Diagnostic Statement No overt s/s of aspiration. Seemed to have timely swallow.   Clinical Swallow Evaluation: Puree Solid Texture Trial   Mode of Presentation, Puree spoon;fed by clinician   Volume of Puree Presented 6 bites   Diagnostic Statement No overt s/s of aspiration. No complaints of sticking with cream of wheat compared to applesauce but did not want much.   Esophageal Phase of Swallow   Patient reports or presents with symptoms of esophageal dysphagia Yes   Esophageal comments GI saw today, issues seem more esophageal in nature   Swallowing Recommendations   Diet Consistency Recommendations full liquid diet;other (see comments)  (advance per GI)   Mode of Delivery Recommendations bolus size, small;slow rate of intake   Swallowing Maneuver Recommendations alternate food and liquid intake   Monitoring/Assistance Required (Eating/Swallowing) monitor for cough or change in vocal quality with intake   Recommended Feeding/Eating Techniques (Swallow Eval) maintain upright posture during/after eating for 30 minutes   Instrumental Assessment Recommendations instrumental evaluation not recommended at this time   Comment, Swallowing Recommendations softer/moisture foods would be more tolerated at this time   General Therapy Interventions   Planned Therapy Interventions Dysphagia Treatment   Dysphagia treatment Instruction of safe swallow strategies;Modified diet education;Compensatory strategies for swallowing   SLP Therapy Assessment/Plan   Criteria for Skilled Therapeutic Interventions Met (SLP Eval) yes;treatment  indicated   SLP Diagnosis dysphagia, more concerned with esophageal   Rehab Potential (SLP Eval) good, to achieve stated therapy goals   Therapy Frequency (SLP Eval) 3 times/wk   Predicted Duration of Therapy Intervention (SLP Eval) 1-2 weeks or per diagnosis/prognosis with GI concerns   Therapy Plan Review/Discharge Plan (SLP)   Therapy Plan Review (SLP) evaluation/treatment results reviewed;care plan/treatment goals reviewed;risks/benefits reviewed;participants voiced agreement with care plan;participants included;patient;daughter   SLP Discharge Planning    SLP Discharge Recommendation (DC Rec) home with assist  (defer to treatment team for placement/setting)   SLP Rationale for DC Rec may need ST services at discharge, will monitor   SLP Brief overview of current status  on full liquid diet, concerns with GI    Total Evaluation Time   Total Evaluation Time (Minutes) 16

## 2021-10-31 NOTE — PROGRESS NOTES
Care Management Follow Up    Length of Stay (days): 0    Expected Discharge Date: 11/02/2021       Concerns to be Addressed:   Alteration in respiratory status requiring: IV Cipro, IV Flagyl and increased oxygen needs (2 liters at baseline, currently at 5 liters). IV fluid support.  Patient plan of care discussed at interdisciplinary rounds: Yes    Anticipated Discharge Disposition:  Transitional care (TCU) recommended.      Anticipated Discharge Services:  Daily therapy, skilled nursing.  Anticipated Discharge DME:  Per therapy (if indicated).    Patient/family educated on Medicare website which has current facility and service quality ratings:  Yes  Education Provided on the Discharge Plan:  Per team  Patient/Family in Agreement with the Plan:  yes    Referrals Placed by CM/SW:  [facility]; [facility]; [facility]; [facility] [place]; [facility];   Private pay costs discussed: Not applicable at this time.     Additional Information:  Patient lives in a house with his son Fabian. Fabian is his primary caregiver but states that patient has been declining quickly over the past few months and now Fabian is really not able to meet his needs at home (patient is unable to ambulate at this time). Patient uses continuous oxygen at 2 liters at home.  TCU is recommended (and family is aware many places are full) but their first choice for TCU is [facility], 2nd choice is [facility], and 3rd choice is the [facility]. Primary family contact is patient's daughter Iliana at [phone]. Family plans to transport at discharge (pending mobility and medical needs at the time of discharge).    Lisa Raymundo RN

## 2021-10-31 NOTE — PROGRESS NOTES
[facility]    PROGRESS NOTE - Hospitalist Service    Assessment and Plan    Active Problems:    A-fib (H)    NICM (nonischemic cardiomyopathy) (H)    Pneumonia of both lungs due to infectious organism, unspecified part of lung    Chronic respiratory failure (H)    COPD, group B, 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 (H)    Leukocytosis, unspecified type    JERED (acute kidney injury) (H)    Proctitis    Myeloproliferative disorder (H)    Splenomegaly    Prostate cancer (H)    Polycythemia    JANETH (obstructive sleep apnea)    Acute on chronic respiratory failure with hypoxia (H)    Efe Huertas is a 89 year old old male with h/o multiple complaints   Possible PNA but no change in respiratory status or increased O2 need and CT shows infiltrate vs fluid vs atelectasis but also question of proctitis. Patient  Does complain of straining for BMs lately but no blood. Afebrile   - change abx to IV flagyl and levaquin for now  - IS   - continue home inhaler   - GI to see and would like EGD but unable to perform with O2 needs   - procalcitonin and lactic normal   - WBC elevated from his myeloproliferative disorder   - afeb   - question if possible aspirations?? SLP to see and VSS      Myeloproliferative disorder...Polycythemia do not have Heme notes and appears elevated WBC is chronic ?? H/o splenomegaly, polycythemia DEBRA 2+ requiring intermittent phlebotomy and Hydrea  - trend  - platelets elevated and will monitor  - continue Hydroxyurea   - Heme consult is followed by Dr. Zepeda, no further changes   - WBC elevated and chronic 25>29>26. Uncertain if related to infection since was lower on admission      JERED improving with fluids   - IVF  - trend renal function   - hold celebrex      COPD with chronic respiratory failure not in exacerbation, JANETH on cpap/ acute on chronic resp failure   - on 2-3 liters at home now 4-5 liters and very weak  - possible aspiration  - SLP to see and VSS if needed   - Ok to change  to inpatient   - IS, deep breathing and cough      H/o Afib   - has ICD in place  - not anticoagulated due to h/o multiple falls in the past per cardiology note.      COVID-19 PCR Results    COVID-19 PCR Results 5/18/21 10/29/21   SARS-CoV-2 PCR Result Negative    SARS CoV2 PCR  Negative      Comments are available for some flowsheets but are not being displayed.         COVID-19 Antibody Results, Testing for Immunity    COVID-19 Antibody Results, Testing for Immunity   No data to display.            VTE prophylaxis:  Heparin SQ  DIET: Orders Placed This Encounter      Combination Diet Full Liquid    Drains/Lines: none  Weight bearing status: WBAT  Disposition/Barriers to discharge: pending improvement and further work-up. Will need TCU   Code Status: No CPR- Pre-arrest intubation OK    Subjective:  More awake and alert today     PHYSICAL EXAM  Temp:  [97.4  F (36.3  C)-98.5  F (36.9  C)] 98.5  F (36.9  C)  Pulse:  [70-88] 70  Resp:  [20-36] 24  BP: ()/(51-57) 104/52  SpO2:  [84 %-98 %] 90 %  Wt Readings from Last 1 Encounters:   10/29/21 92.1 kg (203 lb)       Intake/Output Summary (Last 24 hours) at 10/31/2021 0805  Last data filed at 10/31/2021 0535  Gross per 24 hour   Intake 1390 ml   Output 1100 ml   Net 290 ml      Body mass index is 26.06 kg/m .    Physical Exam  Constitutional:       Comments: Chronically ill-appearing and nontoxic, much more awake and alert then when seen in ED   HENT:      Head: Normocephalic.   Eyes:      Extraocular Movements: Extraocular movements intact.      Conjunctiva/sclera: Conjunctivae normal.      Pupils: Pupils are equal, round, and reactive to light.      Comments: Arcus senilis    Cardiovascular:      Rate and Rhythm: Normal rate and regular rhythm.      Pulses: Normal pulses.      Comments: Soft EDWARD  Pulmonary:      Comments: Very weak inspiration, nonlabored breathing, no tachypnea noted  diminished at bases no W/R/R  Abdominal:      General: Bowel sounds are normal.       Palpations: Abdomen is soft.   Neurological:      Mental Status: He is alert.         PERTINENT LABS/IMAGING:  Results for orders placed or performed during the hospital encounter of 10/29/21   Chest XR,  PA & LAT    Impression    IMPRESSION: Heart size prominent on this AP view but unchanged. Pulmonary vascularity normal. Subsegmental atelectasis or scarring in the bases. Small bilateral pleural effusions posteriorly in the costophrenic angles. Upper lung fields are clear.   Pacemaker lead tips right atrium and right ventricle. Clips upper abdomen.   CT Abdomen Pelvis w Contrast    Impression    IMPRESSION:   1.  Bibasilar atelectasis or infiltrate and small pleural effusions.  2.  Splenomegaly.  3.  The wall thickening not excluded. Correlate for proctitis.  4.  Diverticulosis.  5.  Remainder similar to previous.   Echocardiogram Complete   Result Value Ref Range    LVEF  50-55%      Most Recent 3 CBC's:  Recent Labs   Lab Test 10/31/21  0413 10/30/21  0838 10/29/21  2310   WBC 26.5* 29.0* 25.4*   HGB 8.2* 8.8* 8.7*   * 115* 114*   * 781* 820*     Most Recent 3 BMP's:  Recent Labs   Lab Test 10/31/21  0413 10/30/21  0838 10/29/21  2310    142 141   POTASSIUM 4.8 4.9 4.9   CHLORIDE 107 106 103   CO2 29 30 29   BUN 24 31* 36*   CR 1.07 1.40* 1.61*   ANIONGAP 7 6 9   CIRA 9.3 9.4 10.0   GLC 92 87 98     Most Recent 2 LFT's:  Recent Labs   Lab Test 10/29/21  2310   AST 19   ALT <9   ALKPHOS 122*   BILITOTAL 0.6       No results for input(s): CHOL, HDL, LDL, TRIG, CHOLHDLRATIO in the last 52478 hours.  No results for input(s): LDL in the last 36379 hours.  Recent Labs   Lab Test 10/31/21  0413      POTASSIUM 4.8   CHLORIDE 107   CO2 29   GLC 92   BUN 24   CR 1.07   GFRESTIMATED 60*   CIRA 9.3     No results for input(s): A1C in the last 01106 hours.  Recent Labs   Lab Test 10/31/21  0413 10/30/21  0838 10/29/21  2310   HGB 8.2* 8.8* 8.7*     Recent Labs   Lab Test 10/30/21  0838  10/29/21  2310 08/12/19  1740   TROPONINI 0.04 0.05 <0.01     Recent Labs   Lab Test 08/12/19  1740   *     Recent Labs   Lab Test 05/19/21  0639   TSH 0.73     Recent Labs   Lab Test 08/12/19  1741   INR 1.25*       Fernanda Dempsey MD  [facility] Medicine Service  [phone]

## 2021-10-31 NOTE — PROGRESS NOTES
Last void was at 0530 this AM.  He was bladder scanned around 1200 for 54cc and again at 1715 for 109cc.  IV fluids at 75cc/hr.  Pt denies urge to void.  FYI message sent to Hospitalist

## 2021-10-31 NOTE — PROGRESS NOTES
Dtr and patient updated of normal head CT.  Both are also aware of change from Observation status to Inpatient.    Sp02 as low as 84% with talking/turning  on 3L with use of fore head pulse ox.  Pt to 88-90% with use of  IS to 1000ml.  He went to 86% after IS.  Increased O2 to 4L during recovery from activity and SpO2 88-91%.  O2 decreased back to 3L after rest period and SpO2 91- 92% on 2L NC.

## 2021-10-31 NOTE — PROGRESS NOTES
[facility]    PROGRESS NOTE - Hospitalist Service    Assessment and Plan    Active Problems:    A-fib (H)    NICM (nonischemic cardiomyopathy) (H)    Pneumonia of both lungs due to infectious organism, unspecified part of lung    Chronic respiratory failure (H)    COPD, group B, 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 (H)    Leukocytosis, unspecified type    JERED (acute kidney injury) (H)    Proctitis    Myeloproliferative disorder (H)    Splenomegaly    Prostate cancer (H)    Polycythemia    JANETH (obstructive sleep apnea)    Efe Huertas is a 89 year old old male with h/o multiple complaints   Possible PNA but no change in respiratory status or increased O2 need and CT shows infiltrate vs fluid vs atelectasis but also question of proctitis. Patient  Does complain of straining for BMs lately but no blood. Afebrile   - change abx to IV flagyl and levaquin for now  - IS   - continue home inhaler   - GI to see and would like EGD but unable to perform with O2 needs   - procalcitonin and lactic normal   - WBC elevated from his myeloproliferative disorder   - afeb   - question if possible aspirations?? SLP to see and VSS      Myeloproliferative disorder...Polycythemia do not have Heme notes and appears elevated WBC is chronic ?? H/o splenomegaly, polycythemia DEBRA 2+ requiring intermittent phlebotomy and Hydrea  - trend  - platelets elevated and will monitor  - continue Hydroxyurea   - Heme consult is followed by Dr. Zepeda, no further changes   - WBC elevated and chronic      JERED improving with fluids   - IVF  - trend renal function   - hold celebrex      COPD with chronic respiratory failure not in exacerbation, JANETH on cpap/ acute on chronic resp failure   - on 2-3 liters at home now 4-5 liters and very weak  - possible aspiration  - SLP to see and VSS if needed   - Ok to change to inpatient   - IS, deep breathing and cough      H/o Afib   - has ICD in place  - not anticoagulated due to h/o multiple falls  in the past per cardiology note.      COVID-19 PCR Results    COVID-19 PCR Results 5/18/21 10/29/21   SARS-CoV-2 PCR Result Negative    SARS CoV2 PCR  Negative      Comments are available for some flowsheets but are not being displayed.         COVID-19 Antibody Results, Testing for Immunity    COVID-19 Antibody Results, Testing for Immunity   No data to display.            VTE prophylaxis:  Heparin SQ  DIET: Orders Placed This Encounter      Combination Diet Full Liquid    Drains/Lines: none  Weight bearing status: WBAT  Disposition/Barriers to discharge: pending improvement and further work-up. Will need TCU   Code Status: No CPR- Pre-arrest intubation OK    Subjective:  More awake and alert today     PHYSICAL EXAM  Temp:  [97.4  F (36.3  C)-98.5  F (36.9  C)] 98  F (36.7  C)  Pulse:  [70-88] 70  Resp:  [20-36] 36  BP: (103-126)/(51-91) 115/56  SpO2:  [84 %-97 %] 95 %  Wt Readings from Last 1 Encounters:   10/29/21 92.1 kg (203 lb)       Intake/Output Summary (Last 24 hours) at 10/31/2021 0805  Last data filed at 10/31/2021 0535  Gross per 24 hour   Intake 1390 ml   Output 1100 ml   Net 290 ml      Body mass index is 26.06 kg/m .    Physical Exam  Constitutional:       Comments: Chronically ill-appearing and nontoxic, much more awake and alert then when seen in ED   HENT:      Head: Normocephalic.   Eyes:      Extraocular Movements: Extraocular movements intact.      Conjunctiva/sclera: Conjunctivae normal.      Pupils: Pupils are equal, round, and reactive to light.      Comments: Arcus senilis    Cardiovascular:      Rate and Rhythm: Normal rate and regular rhythm.      Pulses: Normal pulses.      Comments: Soft EDWARD  Pulmonary:      Comments: Very weak inspiration, nonlabored breathing, no tachypnea noted  diminished at bases no W/R/R  Abdominal:      General: Bowel sounds are normal.      Palpations: Abdomen is soft.   Neurological:      Mental Status: He is alert.         PERTINENT LABS/IMAGING:  Results for  orders placed or performed during the hospital encounter of 10/29/21   Chest XR,  PA & LAT    Impression    IMPRESSION: Heart size prominent on this AP view but unchanged. Pulmonary vascularity normal. Subsegmental atelectasis or scarring in the bases. Small bilateral pleural effusions posteriorly in the costophrenic angles. Upper lung fields are clear.   Pacemaker lead tips right atrium and right ventricle. Clips upper abdomen.   CT Abdomen Pelvis w Contrast    Impression    IMPRESSION:   1.  Bibasilar atelectasis or infiltrate and small pleural effusions.  2.  Splenomegaly.  3.  The wall thickening not excluded. Correlate for proctitis.  4.  Diverticulosis.  5.  Remainder similar to previous.   Echocardiogram Complete   Result Value Ref Range    LVEF  50-55%      Most Recent 3 CBC's:Recent Labs   Lab Test 10/31/21  0413 10/30/21  0838 10/29/21  2310   WBC 26.5* 29.0* 25.4*   HGB 8.2* 8.8* 8.7*   * 115* 114*   * 781* 820*     Most Recent 3 BMP's:Recent Labs   Lab Test 10/31/21  0413 10/30/21  0838 10/29/21  2310    142 141   POTASSIUM 4.8 4.9 4.9   CHLORIDE 107 106 103   CO2 29 30 29   BUN 24 31* 36*   CR 1.07 1.40* 1.61*   ANIONGAP 7 6 9   CIRA 9.3 9.4 10.0   GLC 92 87 98     Most Recent 2 LFT's:Recent Labs   Lab Test 10/29/21  2310   AST 19   ALT <9   ALKPHOS 122*   BILITOTAL 0.6       No results for input(s): CHOL, HDL, LDL, TRIG, CHOLHDLRATIO in the last 46777 hours.  No results for input(s): LDL in the last 97818 hours.  Recent Labs   Lab Test 10/31/21  0413      POTASSIUM 4.8   CHLORIDE 107   CO2 29   GLC 92   BUN 24   CR 1.07   GFRESTIMATED 60*   CIRA 9.3     No results for input(s): A1C in the last 03103 hours.  Recent Labs   Lab Test 10/31/21  0413 10/30/21  0838 10/29/21  2310   HGB 8.2* 8.8* 8.7*     Recent Labs   Lab Test 10/30/21  0838 10/29/21  2310 08/12/19  1740   TROPONINI 0.04 0.05 <0.01     Recent Labs   Lab Test 08/12/19  1740   *     Recent Labs   Lab Test  05/19/21  0639   TSH 0.73     Recent Labs   Lab Test 08/12/19  1741   INR 1.25*       Fernanda Dempsey MD  [facility] Medicine Service  [phone]

## 2021-10-31 NOTE — PROGRESS NOTES
PRIMARY DIAGNOSIS: PNEUMONIA  OUTPATIENT/OBSERVATION GOALS TO BE MET BEFORE DISCHARGE:  1. Dyspnea improved and O2 sats >88% on RA or back to baseline O2 levels: No   SpO2: 97 %, O2 Device: Nasal cannula    2. Tolerating oral abx or appropriate plans made outpatient infusion: No    3. Vitals signs normal or return to baseline: No    4. Short term supplemental O2 needed with activity at home: Yes    5. Tolerate oral intake to maintain hydration: Yes    6. Return to near baseline physical activity: No    Discharge Planner Nurse   Safe discharge environment identified: No needs to go to care facility per family  Barriers to discharge: Yes  O2 demands on 5L oxymask. Difficulty swallowing       Entered by: Anika Rascon 10/31/2021 5:55 AM     Please review provider order for any additional goals.   Nurse to notify provider when observation goals have been met and patient is ready for discharge.

## 2021-10-31 NOTE — PROGRESS NOTES
Pt just returned for head CT.  Stores called for P[employer]er and head O2 monitor.  SpO2 decreased to 86-88% with activity/talking.  Accuracy of finger probe is questioned.  IS Brought to bedside. Patient achieved 500-1000ml.  Speech returned call and they said they will still be coming this afternoon.  Daughter is still at bedside and had a chance to meet with the Hospitalist today.  Pt is more alert this afternoon and speech sounds more clear.

## 2021-10-31 NOTE — CONSULTS
"[facility]      IMPRESSION:   Jak2 positive myeloproliferative disorder, stable on Hydroxyurea 500 mg orally daily  Macrocytic anemia:  MDS vs myelofibrosis, stable around Hb 8.2 g/dL, may need bone marrow biopsy to clarify  Leukocytosis, improved on IV abx suggesting component of infection.   Splenomegaly, progressively worsening    PLAN:  1. I would continue Hydroxyurea 500 mg orally daily for now  2. He may benefit from BMBx when acute illness improves and family desired  3. IV abx for pneumonia per admitting team  4. Dr. Zepeda will follow       Active Problems:    A-fib (H)    NICM (nonischemic cardiomyopathy) (H)    Pneumonia of both lungs due to infectious organism, unspecified part of lung    Chronic respiratory failure (H)    COPD, group B, 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 (H)    Leukocytosis, unspecified type    JERED (acute kidney injury) (H)    Proctitis    Myeloproliferative disorder (H)    Splenomegaly    Prostate cancer (H)    Polycythemia    JANETH (obstructive sleep apnea)      Kiya is a 89 year old old male. Pt of Dr. Zepeda, requested to be see by Dr. Dempsey for evaluation of myeloproliferative disoreder, leukocytosis and anemia     He has presented  with multiple complaints: including weakness, SOB, tachycardia.   CT shows infiltrate vs fluid vs atelectasis but also question of proctitis. He is about the same since admission. He is on oxygen. His wife is at bedside.          HPI: Efe Huertas is a 89 year old old male with h/o atrial fibrillation, COPD,  Cardioverter/defibrillator, CHF, cardiomyopathy and obstructive airway disease who presents to this Emergency Department for evaluation of decreased appetite.      Patient reports endorsing \"whole body\" weakness for the past 2 months that has been progressively worsening. He has sustained 2 falls in the past month. He also endorses associated symptoms of nausea and vomiting with no blood present. He notes that he does have " low grade fevers at times. He does also complain for dizziness, fatigues, poor appetite, abdominal discomfort/chest discomfort but can not describe and constipation.   Denies Orthopnea, PND but does have chronic SOB.      He denies headache,diarrhea, dysuria, cough or any other associated symptoms at this time.      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     Congestive heart failure (H) 5/17/2007     Overview:  Systolic with EF 30-35% on ECHO 5/17/2007      COPD, group B, 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 (H) 2/21/2013     Dizziness 8/12/2019     Episodic mood disorder (H) 5/22/2020     Left foot drop 6/12/2009     NICM (nonischemic cardiomyopathy) (H) 3/26/2009     Obstructive airway disease (H) 2/21/2013     JANETH (obstructive sleep apnea) 10/30/2021     Polycythemia 10/30/2021     Prostate cancer (H) 10/30/2021     Splenomegaly 10/30/2021     Ventricular bigeminy        Past Surgical History:   Procedure Laterality Date     ABDOMINAL AORTIC ANEURYSM REPAIR         AS REPAIR 1 COLLAT ANKLE LIGMNT,PRIMARY         CHOLECYSTECTOMY         COLECTOMY WITH COLOSTOMY, COMBINED         EP ICD / PACEMAKER / LOOP RECORDER         Pacemaker placement     HERNIA REPAIR         HRW VEIN STRIPPER         IMPLANTED DEVICE         AL ANESTH,REMOVAL OF ADRENAL         PROSTATECTOMY         REPAIR BLADDER             Family History   Family history unknown: Yes      Family history noncontributory     Allergies   Allergen Reactions     Blood-Group Specific Substance Other (See Comments)       Patient has an anti-C.  Blood product orders may be delayed.  Please draw one red top tube and two lavender top tubes for all Type and Screens/ Type and Crossmatch orders.      Thiopental Nausea and Vomiting         PRIOR TO  ADMISSION MEDICATIONS     Prescriptions Prior to Admission   (Not in a hospital admission)         REVIEW OF SYSTEMS:  12 point reviewed pertinent negatives and positives in HPI all others negative      PHYSICAL EXAM  Temp:  [98.2  F (36.8  C)] 98.2  F (36.8  C)  Pulse:  [74-91] 83  Resp:  [16-38] 24  BP: (121-138)/(58-92) 121/92  SpO2:  [91 %-98 %] 91 %      Wt Readings from Last 1 Encounters:   10/29/21 92.1 kg (203 lb)   Physical Exam  Constitutional:       General: He is not in acute distress.     Appearance: He is ill-appearing. He is not toxic-appearing.   HENT:      Head: Normocephalic and atraumatic.      Mouth/Throat:      Mouth: Mucous membranes are dry.      Pharynx: Oropharynx is clear.   Eyes:      Extraocular Movements: Extraocular movements intact.      Conjunctiva/sclera: Conjunctivae normal.      Pupils: Pupils are equal, round, and reactive to light.      Comments: Arcus senilis    Neck:      Vascular: No carotid bruit.   Cardiovascular:      Rate and Rhythm: Normal rate and regular rhythm.      Pulses: Normal pulses.      Comments: Soft EDWARD, no R/G  PM in place  Pulmonary:      Effort: Pulmonary effort is normal.      Breath sounds: Normal breath sounds.      Comments: No Wheezes, rales or rhonchi  nonlabored breathing   Abdominal:      General: Bowel sounds are normal.      Palpations: Abdomen is soft.   Musculoskeletal:         General: Normal range of motion.      Cervical back: Normal range of motion. No rigidity.      Comments: Pretibial edema    Skin:     General: Skin is warm and dry.      Capillary Refill: Capillary refill takes less than 2 seconds.      Coloration: Skin is pale.   Neurological:      General: No focal deficit present.      Mental Status: He is alert and oriented to person, place, and time. Mental status is at baseline.      Comments: Diffuse weakness   Psychiatric:         Mood and Affect: Mood normal.         Behavior: Behavior normal.         Most Recent 3 CBC's:         Recent Labs   Lab Test 10/30/21  0838 10/29/21  2310 05/19/21  0639   WBC 29.0* 25.4* 18.8*   HGB 8.8* 8.7* 9.4*   * 114* 112*   * 820* 700*      Most Recent 3 BMP's:        Recent Labs   Lab Test 10/30/21  0838 10/29/21  2310 05/19/21  0639    141 138   POTASSIUM 4.9 4.9 4.8   CHLORIDE 106 103 99   CO2 30 29 33*   BUN 31* 36* 16   CR 1.40* 1.61* 0.78   ANIONGAP 6 9 6   CIRA 9.4 10.0 8.6   GLC 87 98 90      Most Recent 2 LFT's:      Recent Labs   Lab Test 10/29/21  2310   AST 19   ALT <9   ALKPHOS 122*   BILITOTAL 0.6      Most Recent Urinalysis:      Recent Labs   Lab Test 10/30/21  0009   COLOR Yellow   APPEARANCE Turbid*   URINEGLC Negative   URINEBILI Negative   URINEKETONE Negative   SG 1.020   UBLD Negative   URINEPH 5.0   PROTEIN 30 *   NITRITE Negative   LEUKEST Negative   RBCU 4*   WBCU 19*

## 2021-10-31 NOTE — PROGRESS NOTES
PRIMARY DIAGNOSIS: PNEUMONIA  OUTPATIENT/OBSERVATION GOALS TO BE MET BEFORE DISCHARGE:  1. Dyspnea improved and O2 sats >88% on RA or back to baseline O2 levels: No   SpO2: 97 %, O2 Device: Nasal cannula    2. Tolerating oral abx or appropriate plans made outpatient infusion: No    3. Vitals signs normal or return to baseline: No    4. Short term supplemental O2 needed with activity at home: Yes    5. Tolerate oral intake to maintain hydration: Yes    6. Return to near baseline physical activity: No    Discharge Planner Nurse   Safe discharge environment identified: No needs to go to care facility per family  Barriers to discharge: Yes  O2 demands on 4l nasal cannula. Difficulty swallowing       Entered by: Anika Rascon 10/31/2021 5:58 AM     Please review provider order for any additional goals.   Nurse to notify provider when observation goals have been met and patient is ready for discharge.

## 2021-10-31 NOTE — PROGRESS NOTES
PRIMARY DIAGNOSIS: PNEUMONIA  OUTPATIENT/OBSERVATION GOALS TO BE MET BEFORE DISCHARGE:  1. Dyspnea improved and O2 sats >88% on RA or back to baseline O2 levels: No   SpO2: 97 %, O2 Device: Nasal cannula    2. Tolerating oral abx or appropriate plans made outpatient infusion: No    3. Vitals signs normal or return to baseline: No    4. Short term supplemental O2 needed with activity at home: Yes    5. Tolerate oral intake to maintain hydration: Yes    6. Return to near baseline physical activity: No    Discharge Planner Nurse   Safe discharge environment identified: No needs to go to care facility per family  Barriers to discharge: Yes  O2 demands on 5L oxymask. Difficulty swallowing       Entered by: Anika Rascon 10/31/2021 5:59 AM     Please review provider order for any additional goals.   Nurse to notify provider when observation goals have been met and patient is ready for discharge.

## 2021-11-01 NOTE — PROVIDER NOTIFICATION
"Bedside RN informed writer that patient was noted to be desaturating frequently on oxymask--needing 4lpm at the time. Writer noted that there was a noc CPAP order in place for patient. As such, hosp Auto-titrating CPAP unit placed (Auto 5-20) with 4lpm bleed-in. Resp rate noted at 25bpm, Sp02 94% at the time. Appeared alert and oriented.      Addendum at ~~0130: Bedside RN notified writer that patient removed the CPAP unit; back on 4lpm oxymask.      11/01/21 0030   Mode: CPAP/ BiPAP/ AVAPS/ AVAPS AE   CPAP/BiPAP/ AVAPS/ AVAPS AE Mode CPAP   CPAP/BiPAP/Settings   $BIPAP/CPAP Therapy continuous   IPAP/EPAP (cmH2O) Auto 5-20   CPAP/BiPAP/AVAPS/AVAPS AE Patient Assessment   Interface Face Mask - Large   Skin Integrity No breakdown  (Liquicell applied )   Humidifier Checked N/A   RT Device Skin Assessment   Oxygen Delivery Device CPAP/BiPAP Mask   Site Appearance neck circumference Clean and dry   Site Appearance bridge of nose Clean and dry   Site Appearance occiput Clean and dry   Strap Tightness Finger Allowance between head and device strap   Skin Interventions Taken No adjustments needed       /60 (BP Location: Left arm)   Pulse 97   Temp 97.9  F (36.6  C) (Axillary)   Resp (!) 36   Ht 1.88 m (6' 2\")   Wt 92.1 kg (203 lb)   SpO2 95%   BMI 26.06 kg/m       Satish Carpenter, RRT  "

## 2021-11-01 NOTE — PROGRESS NOTES
Care Management Follow Up    Length of Stay (days): 1    Expected Discharge Date: 11/02/2021     Concerns to be Addressed:     Clinical progression  Patient plan of care discussed at interdisciplinary rounds: Yes    Anticipated Discharge Disposition:  Likely transitional care     Anticipated Discharge Services:  Nursing, PT and OT  Anticipated Discharge DME:  To be determined    Patient/family educated on Medicare website which has current facility and service quality ratings:    Education Provided on the Discharge Plan:  Yes   Patient/Family in Agreement with the Plan:  Yes, patient's daughter Iliana at [phone]    Referrals Placed by CM/SW:  Post acute care  Private pay costs discussed: Not applicable    Additional Information:  SW completed chart review and met with patient and his daughter Iliana.  Per PT evaluation on 10/31/21, recommendation is transitional care and daughter in agreement. Informed daughter will follow referrals when closer to discharge- ([facility] is Closed, [facility] not currently taking admissions.) Choice would be [facility] or [facility], [facility].  SW will follow progression of care for discharge disposition.        CONNOR Zavaleta

## 2021-11-01 NOTE — CONSULTS
We have been asked to see Efe Huertas at [facility] by Dr. Shima Waters, for evaluation of nonsustained ventricular tachycardia.     Impression and Plan     Assessment:  1. Nonsustained ventricular tachycardia. This has been seen on prior device checks. He is not having any obvious symptoms or hemodynamic instability from this. He was noted to have normal coronary arteries many years ago.  2. Acute on chronic respiratory failure. Likely multifactorial, with underlying chronic obstructive pulmonary disease and suspected pneumonia certainly contributing. He does appears a bit fluid-overloaded today, also noted on his most recent chest X-ray.  3. Chronic congestive heart failure with preserved left ventricular ejection fraction, mostly recently noted to be 50%. Appears a bit fluid-overloaded, as above.  4. Paroxysmal atrial fibrillation. [id number] score is at least 3.  5. Suggestion of aortic stenosis on echocardiogram. His physical examination is not consistent with significant aortic stenosis.  6. Dilated ascending thoracic aorta, stable compared to prior echocardiograms.  7. High-grade atrioventricular block and underlying left bundle branch block status post [employer] biventricular pacemaker and defibrillator placement on 2/8/2013. Normal device function.  8. Obstructive sleep apnea with history of CPAP noncompliance.  9. JAK2-positive myeloproliferative disorder.    Plan:    Initiate diuresis with IV furosemide 40 mg twice daily.    Given his age and co-morbidities, lack of clear symptoms from his ventricular tachycardia or anginal symptoms, and the fact that this has been seen on prior device interrogations, I would not consider an ischemic evaluation at this time    He previously was weaned off beta blocker therapy due to lightheadedness. As he is not clearly symptomatic from his ventricular tachycardia, we can continue to hold this.    He was previously taken off oral  anticoagulation due to concern for falls.    Primary Cardiologist: Dr. Prashanth uBll with [facility]    History of Present Illness      Mr. Efe Huertas is a 89 year old male with a history of HFpEF s/p [employer] CRT-D placement on 2/8/2013, COPD on intermittent daytime oxygen, JANETH with history of CPAP noncompliance on nocturnal supplemental oxygen, paroxysmal AF not on anticoagulation, and JAK2-positive myeloproliferative disorder admitted with multiple symptoms including weakness, dysphagia, and shortness of breath. He has had worsened oxygen requirements. He is receiving IV antibiotics for pneumonia versus proctitis. His device was interrogated, which showed several episodes of NSVT and occasional pacemaker-mediated tachycardia.    He is unable to provide much history for me. His daughter reports that he has been progressively weaker and short of breath. No complaints of chest pain, palpitations, device shocks, or syncope. He reportedly had a coronary angiogram approximately 15-20 years ago, at the onset of his cardiomyopathy, which showed normal coronary arteries. He was previously on carvedilol, but this was weaned off due to persistent lightheadedness. He was previously on apixaban, but this was stopped due to concerns for falls.    Review of Systems:  Further review of systems is otherwise negative/noncontributory (based on review of medical record (admission H&P) and 13 point review of systems reviewed. Pertinent positives noted).    Cardiac Diagnostics     ECG 10/29/2021: Personally reviewed and interpreted: A-sensed, BiV paced    Telemetry (personally reviewed): Mostly BiV-paced, underlying rhythm appears to be atrial fibrillation currently    Device interrogation 10/30/2021:  2 years battery life remaining  Several episodes of NSVT, occasional episodes of pacemaker-mediated tachycardia since last device check Dec 2020.  No sustained arrhythmias requiring  therapy.  98% Bi-V paced    Most recent:  Echocardiogram 10/31/2021 (results reviewed):   The left ventricle is mildly dilated.  There is mild to moderate concentric left ventricular hypertrophy.  LV systolic function appears lower limits of normal  The visual ejection fraction is 50-55%.  Septal wall motion abnormality may reflect pacemaker activation.  TAPSE is normal, which is consistent with normal right ventricular systolic  function.  Pacemaker lead in the right heart  There is mild (1+) tricuspid regurgitation.  Estimate of RV systolic pressure is elevated at 54mmhg plus right atrial  pressure  Mild to moderate valvular aortic stenosis. Peak velocity 2.6 m/s,mean gradient  14mmhg. 2D visualization of the aortic valve suggests the potential for more  significant aortic stenosis then calculated by doppler examination  There is mild (1+) aortic regurgitation.  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     Congestive heart failure (H) 5/17/2007    Overview:  Systolic with EF 30-35% on ECHO 5/17/2007      COPD, group B, 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 (H) 2/21/2013     Dizziness 8/12/2019     Episodic mood disorder (H) 5/22/2020     Left foot drop 6/12/2009     NICM (nonischemic cardiomyopathy) (H) 3/26/2009     Obstructive airway disease (H) 2/21/2013     JANETH (obstructive sleep apnea) 10/30/2021     Polycythemia 10/30/2021     Prostate cancer (H) 10/30/2021     Splenomegaly 10/30/2021     Ventricular bigeminy      Past Surgical History:   Procedure Laterality Date      ABDOMINAL AORTIC ANEURYSM REPAIR       AS REPAIR 1 COLLAT ANKLE LIGMNT,PRIMARY       CHOLECYSTECTOMY       COLECTOMY WITH COLOSTOMY, COMBINED       EP ICD / PACEMAKER / LOOP RECORDER      Pacemaker placement     HERNIA REPAIR       HRW VEIN STRIPPER       IMPLANTED DEVICE       TX ANESTH,REMOVAL OF ADRENAL       PROSTATECTOMY       REPAIR BLADDER       TAKEDOWN COLOSTOMY       Family History   Family history unknown: Yes           Social History     Socioeconomic History     Marital status:      Spouse name: Not on file     Number of children: Not on file     Years of education: Not on file     Highest education level: Not on file   Occupational History     Not on file   Tobacco Use     Smoking status: Former Smoker     Packs/day: 1.00     Years: 56.00     Pack years: 56.00     Types: Cigarettes     Quit date:      Years since quittin.8     Smokeless tobacco: Never Used   Substance and Sexual Activity     Alcohol use: Not Currently     Drug use: Never     Sexual activity: Not on file   Other Topics Concern     Not on file   Social History Narrative     Not on file     Social Determinants of Health     Financial Resource Strain:      Difficulty of Paying Living Expenses:    Food Insecurity:      Worried About Running Out of Food in the Last Year:      Ran Out of Food in the Last Year:    Transportation Needs:      Lack of Transportation (Medical):      Lack of Transportation (Non-Medical):    Physical Activity:      Days of Exercise per Week:      Minutes of Exercise per Session:    Stress:      Feeling of Stress :    Social Connections:      Frequency of Communication with Friends and Family:      Frequency of Social Gatherings with Friends and Family:      Attends [employer] Services:      Active Member of Clubs or Organizations:      Attends Club or Organization Meetings:      Marital Status:    Intimate Partner Violence:      Fear of Current or Ex-Partner:      Emotionally Abused:      Physically  "Abused:      Sexually Abused:              Physical Examination   VITALS: /64 (BP Location: Left arm)   Pulse 97   Temp 97.9  F (36.6  C) (Axillary)   Resp 20   Ht 1.88 m (6' 2\")   Wt 93.4 kg (206 lb)   SpO2 94%   BMI 26.45 kg/m    BMI: Body mass index is 26.45 kg/m .  Wt Readings from Last 3 Encounters:   11/01/21 93.4 kg (206 lb)   09/09/19 101.3 kg (223 lb 6.4 oz)   10/11/19 102.8 kg (226 lb 9.6 oz)         Intake/Output Summary (Last 24 hours) at 11/1/2021 1625  Last data filed at 11/1/2021 1600  Gross per 24 hour   Intake 1631 ml   Output 450 ml   Net 1181 ml       General Appearance:  Somnolent, in no distress.   Head:  Normocephalic, without obvious abnormality, atraumatic   Eyes:  PERRL, conjunctiva/corneas clear, EOM's intact   Ears:  Normal external ear canals bilaterally   Nose: Nares normal, septum midline, no drainage   Throat: Lips, mucosa, and tongue normal; teeth and gums normal   Neck: Supple, symmetrical, trachea midline, no adenopathy, no carotid bruit. JVP appears elevated at 10-12 cm H2O.   Back:   Symmetric, no abnormal curvature, ROM normal   Lungs:   Clear to auscultation bilaterally, respirations unlabored   Chest Wall:  No tenderness or deformity   Heart:  Regular rate and rhythm, S1, S2 normal. Soft systolic murmur heard loudest at the right upper sternal border. No rub or gallop   Abdomen:   Soft, non-tender, bowel sounds active all four quadrants,  no masses, no organomegaly   Extremities: Extremities normal, atraumatic, no cyanosis or edema   Skin: Skin color, texture, turgor normal, no rashes or lesions   Psychiatric: Somnolent.   Neurologic: Somnolent, Moves all 4 extremities            Imaging      CXR 11/1/2021 (images personally reviewed and interpreted):  EXAM: XR CHEST PORT 1 VIEW  LOCATION: [facility]  DATE/TIME: 11/1/2021 11:10 AM     INDICATION: persistent hypoxia  COMPARISON: 10/29/2021                                                      "                 IMPRESSION: Stable position multilead left subclavian pacemaker. Stable heart size. There is cephalization of blood flow, and small pleural effusions, increased from previous. These findings suggest CHF. Also, there is new left mid to inferior chest   opacity which could represent asymmetric pulmonary edema, or pneumonia.       Lab Results    Chemistry/lipid CBC Cardiac Enzymes/BNP/TSH/INR     Recent Labs   Lab Test 11/01/21  0808      POTASSIUM 4.9   CHLORIDE 109*   CO2 27   GLC 94   BUN 28   CR 1.28   GFRESTIMATED 48*   CIRA 9.2     Recent Labs   Lab Test 11/01/21  0808 10/31/21  0413 10/30/21  0838   CR 1.28 1.07 1.40*            Recent Labs   Lab Test 11/01/21  0808   WBC 32.6*   HGB 8.2*   HCT 28.5*   *   *     Recent Labs   Lab Test 11/01/21  0808 10/31/21  0413 10/30/21  0838   HGB 8.2* 8.2* 8.8*    Recent Labs   Lab Test 10/30/21  0838 10/29/21  2310 08/12/19  1740   TROPONINI 0.04 0.05 <0.01     Recent Labs   Lab Test 08/12/19  1740   *     Recent Labs   Lab Test 05/19/21  0639   TSH 0.73     Recent Labs   Lab Test 08/12/19  1741   INR 1.25*           Current Inpatient Scheduled Medications   Scheduled Meds:    furosemide  40 mg Intravenous Q12H     heparin ANTICOAGULANT  5,000 Units Subcutaneous Q12H     hydroxyurea  500 mg Oral Daily     levofloxacin  500 mg Intravenous Q24H     metroNIDAZOLE  500 mg Intravenous Q12H     mirtazapine  3.75 mg Oral At Bedtime     pantoprazole  40 mg Oral QAM AC     polyethylene glycol  17 g Oral Daily     senna-docusate  1 tablet Oral Daily     Continuous Infusions:    sodium chloride 75 mL/hr at 11/01/21 0118            Medications Prior to Admission   Prior to Admission medications    Medication Sig Start Date End Date Taking? Authorizing Provider   acetaminophen (TYLENOL) 500 MG tablet [ACETAMINOPHEN (TYLENOL) 500 MG TABLET] Take 500-1,000 mg by mouth every 6 (six) hours as needed for pain. 8/12/19  Yes Provider, Historical    albuterol (PROAIR HFA;PROVENTIL HFA;VENTOLIN HFA) 90 mcg/actuation inhaler [ALBUTEROL (PROAIR HFA;PROVENTIL HFA;VENTOLIN HFA) 90 MCG/ACTUATION INHALER] Inhale 2 puffs every 6 (six) hours as needed for wheezing. 8/12/19  Yes Provider, Historical   celecoxib (CELEBREX) 200 MG capsule Take 200 mg by mouth 2 times daily as needed  5/18/21  Yes Provider, Historical   hydroxyurea (HYDREA) 500 mg capsule [HYDROXYUREA (HYDREA) 500 MG CAPSULE] Take 500 mg by mouth daily. Monday through Friday 8/12/19  Yes Provider, Historical   mirtazapine (REMERON) 7.5 MG tablet Take 3.75 mg by mouth At Bedtime  8/12/19  Yes Provider, Historical   senna-docusate (SENOKOT-S/PERICOLACE) 8.6-50 MG tablet Take 1 tablet by mouth daily   Yes Unknown, Entered By History   vitamin C (ASCORBIC ACID) 1000 MG TABS Take 1,000 mg by mouth daily   Yes Unknown, Entered By History   Vitamin D3 (CHOLECALCIFEROL) 125 MCG (5000 UT) tablet Take 125 mcg by mouth daily   Yes Unknown, Entered By History          Janes Khalil MD [facility]  Non-Invasive Cardiologist  [facility]  Pager [phone]

## 2021-11-01 NOTE — PLAN OF CARE
See progress notes/provider notification note for updates overnight.     Patient complained of chest discomfort which was alleviated by PRN 650mg tylenol.    Pt is currently on 2L oxymask  with o2 sats around 90-91%. Pt RR is 26. MD and RT have both been notified of findings. Attempted to place patient on CPAP last night around 0000, pt wore cpap for about an hour then refused to have it back on.     When patient lays on right side his O2 stays above 90% but when patient lays on left side he desats down to the low 80s.     Patient has been voiding small amounts throughout the night. Bladder scan showed -- this AM.

## 2021-11-01 NOTE — PROVIDER NOTIFICATION
Patient describing chest pressure 6/10 pain. Not radiating. Pt is on 3L oxymask. o2 sats drop with any movement and talking frequently.     Talked with Dr. Haddad and ordered to continue to monitor.

## 2021-11-01 NOTE — PROGRESS NOTES
[facility] DIGESTIVE HEALTH PROGRESS NOTE      Subjective:              Desaturation events noted overnight. Cough this morning. Better oxygen sats on right side than when supine or on left side.      Objective:                Body mass index is 26.45 kg/m .  Vital signs in last 24 hrs;  Temp:  [97.5  F (36.4  C)-98.8  F (37.1  C)] 98.6  F (37  C)  Pulse:  [70-97] 89  Resp:  [18-36] 18  BP: (104-133)/(52-61) 129/60  SpO2:  [83 %-95 %] 87 %    Physical Exam:   General: Resting comfortably. Oxygen mask in place.   Cardiovascular: Regular rate.  Abdomen: non-distended    Current Labs:  CMP Results: Recent Labs   Lab Test 11/01/21  0808      POTASSIUM 4.9   CHLORIDE 109*   CO2 27   ANIONGAP 7   GLC 94   BUN 28   CR 1.28   BILITOTAL 0.5   ALKPHOS 146*   ALT <9   AST 21      CBC  Recent Labs   Lab 11/01/21  0808 10/31/21  0413 10/30/21  0838 10/29/21  2310   WBC 32.6* 26.5* 29.0* 25.4*   RBC 2.44* 2.40* 2.62* 2.60*   HGB 8.2* 8.2* 8.8* 8.7*   HCT 28.5* 27.9* 30.2* 29.5*   * 116* 115* 114*   MCH 33.6* 34.2* 33.6* 33.5*   MCHC 28.8* 29.4* 29.1* 29.5*   RDW 17.4* 17.2* 17.2* 17.3*   * 675* 781* 820*     INRNo lab results found in last 7 days.   No results found for: LIPASE    Relevant Imaging:  No new abd imaging.     Assessment:       Efe Huertas is a 89 year old male with Jak2 myeloproliferative disorder, COPD on 2 L oxygen at baseline, CHF with defibrillator, JANETH, hx prostate CA admitted with generalized weakness and failure to thrive with 10-15 lb weight loss        # Dysphagia - to liquids and solids. Cause unclear. Motility disorder, stricture, malignancy, diverticulum all possibilities.     # Possible rectal wall thickening seen on CT.   - Last colonoscopy 2008.   - One loose brown stool today    # Acute and chronic hypoxic respiratory failure   # COPD  # Pneumonia  - on oxygen at baseline. Increased oxygen requirements currntly.   - Cough   - Desaturates when supine and on left side.   - On  abx    Plan:     - Will plan for EGD and flex sig once his respiratory status has returned to near baseline and he is ready for discharge. Unfortunately, he will need to be on his left side for his procedures.   - Will follow along.   - Cont mgmt of resp failure and pneumonia.             Leighton Tony, DO  Thank you for the opportunity to participate in the care of this patient.   Please feel free to call me with any questions or concerns.  Phone number [phone].

## 2021-11-01 NOTE — PROGRESS NOTES
"[facility] PROGRESS NOTE      ADMIT DATE: 10/29/2021     FACILITY: [facility]    PCP: Gabriel Weems, None    ASSESSMENT AND PLAN:   Efe Huertas is a 89 year old old male with h/o multiple complaints.     Active Problems:    A-fib (H)    NICM (nonischemic cardiomyopathy) (H)    Pneumonia of both lungs due to infectious organism, unspecified part of lung    Chronic respiratory failure (H)    COPD, group B, 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 (H)    Leukocytosis, unspecified type    JERED (acute kidney injury) (H)    Proctitis    Myeloproliferative disorder (H)    Splenomegaly    Prostate cancer (H)    Polycythemia    JANETH (obstructive sleep apnea)    Acute on chronic respiratory failure with hypoxia (H)      Possible PNA but no change in respiratory status or increased O2 need and CT shows infiltrate vs fluid vs atelectasis but also question of proctitis. Patient  Does complain of straining for BMs lately but no blood. Afebrile.   - GI to see and would like EGD but unable to perform with O2 needs  - procalcitonin and lactic normal   -cultures show NGTD  - WBC elevated from his myeloproliferative disorder   -SLP recommends mechanical soft if on solid diet   -patient's O2 requirement is not improving and is increasing. He is requiring 4 LPM this morning.   -ordered CXR which shows CHF and \"there is new left mid to inferior chest   opacity which could represent asymmetric pulmonary edema, or pneumonia.\"  - c/w IV flagyl and levaquin for now  - IS   - continue home inhaler   -IV lasix 40 mg BID was started by cardiology   -strict I/Os  -daily weights      Proctitis  -seen on CT abdomen  -GI following       Dysphagia  -GI following and appreciate recommendations and would like EGD but unable to perform with O2 needs  -SLP recommends mechanical soft if on solid diet       Nonsustained Vtach  -was seen on the pacemaker interrogation  -cardio consulted who recommended observation for now (not consider an " ischemic evaluation at this time).    Cardio recommends continuing to hold beta-blocker treatment.        Myeloproliferative disorder...Polycythemia do not have Heme notes and appears elevated WBC is chronic ?? H/o splenomegaly, polycythemia DEBRA 2+ requiring intermittent phlebotomy and Hydrea  - trend CBC daily   - platelets elevated and will monitor  - WBC elevated and chronic   - continue Hydroxyurea   -He may benefit from BMBx when acute illness improves and if patient/family desire  - Heme following (sees Dr. Smith)        JERED  - improving with fluids   -decrease IVF to 50 cc/hour  - hold celebrex      COPD with chronic respiratory failure not in exacerbation, JANETH on cpap/ acute on chronic resp failure   -patient's O2 requirement is not improving and is increasing. He is requiring 4 LPM this morning.   - possible aspiration  -SLP recommends mechanical soft if on solid diet   - IS, deep breathing and cough   -c/w albuterol treatments       H/o Afib   - has ICD in place  - not anticoagulated due to h/o multiple falls in the past per cardiology note.              FEN/GI: low salt mechanical soft diet  DVT proph: heparin  Code status: No CPR- Pre-arrest intubation OK    Updated daughter in room on current plan.       Discharge barriers:  -hypoxia, IV antibxs  -ADOD: 2-3 days       SUBJECTIVE:    Patient feels tired and is short of breath. Patient is desatting while laying on his left side and thus prefers to lay on his right side. Patient is coughing. Patient denies any other complaints at this time.     ROS:  12 Points review of systems reviewed and is negative except for what has already been mentioned above    OBJECTIVE:  Patient Vitals for the past 24 hrs:   BP Temp Temp src Pulse Resp SpO2 Weight   11/01/21 0753 129/60 98.6  F (37  C) Oral 89 18 (!) 87 % --   11/01/21 0642 -- -- -- -- -- 91 % --   11/01/21 0542 -- -- -- -- -- -- 93.4 kg (206 lb)   11/01/21 0528 -- -- -- -- -- 91 % --   11/01/21 0434 -- -- --  -- -- 95 % --   11/01/21 0336 -- -- -- -- -- 94 % --   11/01/21 0332 121/60 97.9  F (36.6  C) Axillary 97 (!) 36 91 % --   11/01/21 0137 -- -- -- -- -- 90 % --   10/31/21 2323 133/61 98.8  F (37.1  C) Axillary 87 24 92 % --   10/31/21 1917 117/57 97.5  F (36.4  C) Oral 87 22 92 % --   10/31/21 1530 -- -- -- -- -- 91 % --   10/31/21 1519 -- -- -- -- -- (!) 83 % --   10/31/21 1517 107/53 98.2  F (36.8  C) Oral 93 24 (!) 88 % --   10/31/21 1231 -- -- -- -- -- 90 % --   10/31/21 1219 104/52 98.5  F (36.9  C) Oral 70 24 (!) 88 % --   10/31/21 1213 -- -- -- -- -- 90 % --   10/31/21 1137 -- -- -- 70 -- 92 % --   10/31/21 0932 92/52 97.8  F (36.6  C) Oral 70 (!) 34 98 % --          Intake/Output Summary (Last 24 hours) at 11/1/2021 0802  Last data filed at 11/1/2021 0657  Gross per 24 hour   Intake 2120 ml   Output 300 ml   Net 1820 ml     GENRL: Satting at 94% on 4 LPM.   HEENT: NC/AT      Neck- supple      Sclera- anicteric      Mucous membrane- moist and pink  CHEST: lungs cta b/l  HEART: S1S2 regular. No murmurs, rubs or gallops  ABDMN: Soft. Non-tender.No guarding or rigidity. Bowel sounds- active  EXTRM:  No pedal edema.   NEURO: No involuntary movements  INTGM: please see nursing assessment for full skin assessment      DIAGNOSTIC DATA:          Recent Results (from the past 24 hour(s))   Lactic Acid STAT    Collection Time: 10/31/21  9:55 AM   Result Value Ref Range    Lactic Acid 1.7 0.7 - 2.0 mmol/L        Results for orders placed or performed during the hospital encounter of 10/29/21   Chest XR,  PA & LAT    Impression    IMPRESSION: Heart size prominent on this AP view but unchanged. Pulmonary vascularity normal. Subsegmental atelectasis or scarring in the bases. Small bilateral pleural effusions posteriorly in the costophrenic angles. Upper lung fields are clear.   Pacemaker lead tips right atrium and right ventricle. Clips upper abdomen.   CT Abdomen Pelvis w Contrast    Impression    IMPRESSION:   1.   Bibasilar atelectasis or infiltrate and small pleural effusions.  2.  Splenomegaly.  3.  The wall thickening not excluded. Correlate for proctitis.  4.  Diverticulosis.  5.  Remainder similar to previous.   CT Head w/o Contrast    Impression    IMPRESSION:  1.  No CT evidence for acute intracranial process.  2.  Brain atrophy and presumed chronic microvascular ischemic changes as above.  3.  No change.   Echocardiogram Complete   Result Value Ref Range    LVEF  50-55%           All recent labs reviewed personally  Radiology report reviewed.       The total time spent in preparing this progress note is about 35 minutes, >50% time spent in care co-ordination that includes reviewing labs, images, discussing the plan of care with patient/family, consultants, and .      Shima Waters MD.   [facility]   [phone]   Pager [phone]

## 2021-11-01 NOTE — PROGRESS NOTES
Notified Respiratory therapy that patient was desatting frequently on oxymask. RT placed patient on CPAP with 4L O2 and patient only wore it for about an hour before taking it off and refusing to put back on. Now patient is back on 4L oxymask with o2 sats in the 90s (1:30AM). Respiratory rate is around 24. Will continue to monitor and keep patient between 88-92%.     0350 patients Respiratory rate elevates to 36 when repositioning. Notified RT about RR. After patient settled his RR rate went back to 26-28. RT updated. RT okay with rate of 26-28. Said to notify if rate becomes elevated again. Will continue to monitor. Pt is wearing 3L oxymask currently with o2 sats at 94%

## 2021-11-01 NOTE — PROVIDER NOTIFICATION
House officer Dr. Haddad came to assess patient due to respiratory rate of 26-36. Patient currently is resting at 26. Pt will not wear hospital CPAP mask.  No new orders. Pt is laying on right side with o2 sats at 94% on 2L.

## 2021-11-01 NOTE — PROGRESS NOTES
"    Progress Note     Primary Oncologist/Hematologist:  Dr. 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 Continue Hydroxyurea 500 mg orally daily for now  2. He may benefit from BMBx when acute illness improves and if patient/family desire  3. IV abx for pneumonia per admitting team  4. Cardiology consulted today.    We will follow along.    Edna Evans CNP  [facility]  [phone] (office)        Interval History:     Patient lying in bed.  Daughter at bedside, feels breathing has not gotten any better since admission.  Patient has moist cough on exam.                Review of Systems:     The 5 point Review of Systems is negative other than noted in the HPI             Medications:   Scheduled Medications    heparin ANTICOAGULANT  5,000 Units Subcutaneous Q12H     hydroxyurea  500 mg Oral Daily     levofloxacin  500 mg Intravenous Q24H     metroNIDAZOLE  500 mg Intravenous Q12H     mirtazapine  3.75 mg Oral At Bedtime     pantoprazole  40 mg Oral QAM AC     polyethylene glycol  17 g Oral Daily     senna-docusate  1 tablet Oral Daily     PRN Medications  acetaminophen, albuterol, alum & mag hydroxide-simethicone, benzonatate, bisacodyl, bisacodyl, magnesium hydroxide, melatonin, ondansetron, senna-docusate, sodium phosphate               Physical Exam:   Vitals were reviewed  Blood pressure 105/53, pulse 104, temperature 99.1  F (37.3  C), temperature source Axillary, resp. rate 16, height 1.88 m (6' 2\"), weight 93.4 kg (206 lb), SpO2 94 %.  Wt Readings from Last 4 Encounters:   11/01/21 93.4 kg (206 lb)   09/09/19 101.3 kg (223 lb 6.4 oz)   10/11/19 102.8 kg (226 lb 9.6 oz)   09/03/19 103 kg (227 lb 1.6 oz)       I/O last " 3 completed shifts:  In: 2120 [P.O.:290; I.V.:1830]  Out: 300 [Urine:300]    Constitutional: Awake, alert, cooperative, no apparent distress   Lungs: Moist cough on exam.  Breathing unlabored, wearing O2mask       Abdomen: soft, non-distended, non-tender   Skin: No rashes, no cyanosis, no edema   Other:               Data:   All laboratory data and imaging studies reviewed.    CMP  Recent Labs   Lab 11/01/21  0808 10/31/21  0413 10/30/21  0838 10/29/21  2310    143 142 141   POTASSIUM 4.9 4.8 4.9 4.9   CHLORIDE 109* 107 106 103   CO2 27 29 30 29   ANIONGAP 7 7 6 9   GLC 94 92 87 98   BUN 28 24 31* 36*   CR 1.28 1.07 1.40* 1.61*   GFRESTIMATED 48* 60* 43* 36*   CIRA 9.2 9.3 9.4 10.0   PROTTOTAL 5.7*  --   --  6.4   ALBUMIN 3.4*  --   --  3.9   BILITOTAL 0.5  --   --  0.6   ALKPHOS 146*  --   --  122*   AST 21  --   --  19   ALT <9  --   --  <9     CBC  Recent Labs   Lab 11/01/21  0808 10/31/21  0413 10/30/21  0838 10/29/21  2310   WBC 32.6* 26.5* 29.0* 25.4*   RBC 2.44* 2.40* 2.62* 2.60*   HGB 8.2* 8.2* 8.8* 8.7*   HCT 28.5* 27.9* 30.2* 29.5*   * 116* 115* 114*   MCH 33.6* 34.2* 33.6* 33.5*   MCHC 28.8* 29.4* 29.1* 29.5*   RDW 17.4* 17.2* 17.2* 17.3*   * 675* 781* 820*     INRNo lab results found in last 7 days.  Data   Results for orders placed or performed during the hospital encounter of 10/29/21 (from the past 24 hour(s))   CBC with platelets   Result Value Ref Range    WBC Count 32.6 (H) 4.0 - 11.0 10e3/uL    RBC Count 2.44 (L) 4.40 - 5.90 10e6/uL    Hemoglobin 8.2 (L) 13.3 - 17.7 g/dL    Hematocrit 28.5 (L) 40.0 - 53.0 %     (H) 78 - 100 fL    MCH 33.6 (H) 26.5 - 33.0 pg    MCHC 28.8 (L) 31.5 - 36.5 g/dL    RDW 17.4 (H) 10.0 - 15.0 %    Platelet Count 672 (H) 150 - 450 10e3/uL   Comprehensive metabolic panel   Result Value Ref Range    Sodium 143 136 - 145 mmol/L    Potassium 4.9 3.5 - 5.0 mmol/L    Chloride 109 (H) 98 - 107 mmol/L    Carbon Dioxide (CO2) 27 22 - 31 mmol/L    Anion Gap  7 5 - 18 mmol/L    Urea Nitrogen 28 8 - 28 mg/dL    Creatinine 1.28 0.70 - 1.30 mg/dL    Calcium 9.2 8.5 - 10.5 mg/dL    Glucose 94 70 - 125 mg/dL    Alkaline Phosphatase 146 (H) 45 - 120 U/L    AST 21 0 - 40 U/L    ALT <9 0 - 45 U/L    Protein Total 5.7 (L) 6.0 - 8.0 g/dL    Albumin 3.4 (L) 3.5 - 5.0 g/dL    Bilirubin Total 0.5 0.0 - 1.0 mg/dL    GFR Estimate 48 (L) >60 mL/min/1.73m2   Extra Tube    Narrative    The following orders were created for panel order Extra Tube.  Procedure                               Abnormality         Status                     ---------                               -----------         ------                     Extra Red Top Tube[[id number]]                               Final result                 Please view results for these tests on the individual orders.   Extra Red Top Tube   Result Value Ref Range    Hold Specimen JIC    XR Chest Port 1 View    Narrative    EXAM: XR CHEST PORT 1 VIEW  LOCATION: [facility]  DATE/TIME: 11/1/2021 11:10 AM    INDICATION: persistent hypoxia  COMPARISON: 10/29/2021      Impression    IMPRESSION: Stable position multilead left subclavian pacemaker. Stable heart size. There is cephalization of blood flow, and small pleural effusions, increased from previous. These findings suggest CHF. Also, there is new left mid to inferior chest   opacity which could represent asymmetric pulmonary edema, or pneumonia.

## 2021-11-02 NOTE — PLAN OF CARE
Problem: Adult Inpatient Plan of Care  Goal: Patient-Specific Goal (Individualized)  Outcome: No Change     Problem: Adult Inpatient Plan of Care  Goal: Absence of Hospital-Acquired Illness or Injury  11/1/2021 2000 by Keesha Villaseñor RN  Outcome: No Change  11/1/2021 1957 by Keesha Villaseñor RN  Outcome: No Change  Intervention: Identify and Manage Fall Risk  Recent Flowsheet Documentation  Taken 11/1/2021 1719 by Keesha Villaseñor RN  Safety Promotion/Fall Prevention:   bed alarm on   room near nurse's station   room organization consistent   safety round/check completed  Taken 11/1/2021 0812 by Keesha Villaseñor RN  Safety Promotion/Fall Prevention:   bed alarm on   room near nurse's station   room organization consistent   safety round/check completed  Intervention: Prevent Skin Injury  Recent Flowsheet Documentation  Taken 11/1/2021 1725 by Keesha Villaseñor RN  Body Position:   turned   right  Taken 11/1/2021 1024 by Keesha Villaseñor RN  Body Position:   turned   right  Taken 11/1/2021 1009 by Keesha Villaseñor RN  Body Position: (had been on left side)   turned   supine  Intervention: Prevent Infection  Recent Flowsheet Documentation  Taken 11/1/2021 1719 by Keesha Villaseñor RN  Infection Prevention: single patient room provided  Taken 11/1/2021 0812 by Keesha Villaseñor RN  Infection Prevention: single patient room provided     Problem: Gas Exchange Impaired  Goal: Optimal Gas Exchange  11/1/2021 2000 by Keesha Villaseñor RN  Outcome: Declining  11/1/2021 1957 by Keesha Villaseñor RN  Outcome: Declining  Intervention: Optimize Oxygenation and Ventilation  Recent Flowsheet Documentation  Taken 11/1/2021 1725 by Keesha Villaseñor RN  Head of Bed (HOB) Positioning: HOB at 20-30 degrees  Taken 11/1/2021 1024 by Keesha Villaseñor RN  Head of Bed (HOB) Positioning: HOB at 20 degrees   O2 needs continue to increase. Patient on 8LNC. Pt. Continues to do best on right side. Desats on left and on  back. Next shift aware.

## 2021-11-02 NOTE — PROGRESS NOTES
Daily Progress Note        CODE STATUS:  No CPR- Pre-arrest intubation OK    11/02/21  Assessment/Plan:  Efe Huertas is a 89 year old old male with past medical history of atrial fibrillation, COPD on 2 L nasal cannula, JANETH on CPAP, CHF, myeloproliferative disorder who presented to ED for evaluation of generalized malaise, fatigue, poor appetite and admitted for further management.    Acute on chronic respiratory failure with hypoxia; likely multifactorial-possible pneumonia, acute CHF with preserved EF, physical deconditioning.    Patient with history of COPD on 2-3 L nasal cannula at home and JANETH on CPAP  --On admission, CT  A/P reported bilateral atelectasis or infiltrate and small pleural effusions.  --On 11/1, CXR reported findings consistent with CHF  --On admission normal procalcitonin and normal lactic acid.  --On admission, elevated WBC count.   --Overnight patient required BiPAP, this morning looked comfortable in saturation higher 90s, discussed with respiratory therapist to see if he can maintain saturation on nasal cannula.  --On Levaquin, continue  --Incentive spirometry, bronchial hygiene  --Pulmonary consulted, awaiting input  --Appreciated cardiology input, initiated on IV diuretics on 11/1  --Monitor labs, intake/output, weight closely  --Concern for possible aspiration, speech therapy consulted    Dysphagia, unspecified;  Possible rectal wall thickening on CT, possible proctitis;  --GI consulted, anticipating EGD and flex sig myeloscopy when respiratory status improves  --On IV Levaquin and Flagyl, continue     Myeloproliferative disorder;  Microcytic anemia;  --Appreciated oncology input, reported MDS versus myelofibrosis, may need bone marrow biopsy to clarify in future.  --On hydroxyurea, continue  --WBC elevated on admission, seems elevated since 5/2021, monitor labs closely    Acute kidney injury; ? Cause  --Home Celebrex on hold  --Admission CT abdomen/pelvis negative for  hydronephrosis  --On admission, received IV fluid with improvement on creatinine but concern for CHF and initiated on IV diuretics, creatinine trending up  --Monitor labs, intake/output, weight closely  --Mild hyperkalemia, recheck in a.m.    H/o Afib;  --has ICD in place  --not anticoagulated due to h/o multiple falls in the past per cardiology note.     Nonsustained ventricular tachycardia on device check per cardiology;  --Appreciated cardiology input, reported no ischemic evaluation at this time, also reported previously weaned off beta-blocker due to lightheadedness and recommended continue to hold at this time.    DVT prophylaxis; subcu heparin      Disposition; pending  Barrier to discharge; respiratory failure     LOS: 2 days     Subjective:  Interval History: Patient is new to me.  Patient seen and examined. Notes, labs, imaging reports personally reviewed.  During my visit this morning, patient on the BiPAP, looked comfortable, oximetry showed saturation in higher 90s.  Patient alert and awake, hard of hearing and on BiPAP and not able to provide detail ROS.  Discussed with nursing staffs at bedside.  Discussed with respiratory therapist.  Discussed with the speech therapist.  Discussed with patient's son in detail about ongoing medical issues and management.  Addendum;  Patient seen and examined again around 1 PM.  Of BiPAP since 9 AM and seems tolerating, saturation in low 90s on 6 L.  Discussed with patient's son at bedside again.  Awaiting pulmonary input.    Review of Systems:   As mentioned in subjective.    Patient Active Problem List   Diagnosis     A-fib (H)     NICM (nonischemic cardiomyopathy) (H)     CHB (complete heart block) (H)     Colovesical fistula     Congestive heart failure (H)     Encounter for servicing of implantable cardioverter-defibrillator (ICD) at end of battery life     Biventricular ICD (implantable cardioverter-defibrillator) in place     Obstructive airway disease (H)      Dizziness     Pre-syncope     Ventricular bigeminy     Constipation     Pneumonia of both lungs due to infectious organism, unspecified part of lung     Chronic respiratory failure (H)     COPD, group B, 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 (H)     Episodic mood disorder (H)     Left foot drop     Mobitz type II atrioventricular block     Leukocytosis, unspecified type     JERED (acute kidney injury) (H)     Proctitis     Myeloproliferative disorder (H)     Splenomegaly     Prostate cancer (H)     Polycythemia     JANETH (obstructive sleep apnea)     Acute on chronic respiratory failure with hypoxia (H)       Scheduled Meds:    furosemide  40 mg Intravenous Q12H     heparin ANTICOAGULANT  5,000 Units Subcutaneous Q12H     hydroxyurea  500 mg Oral Daily     levofloxacin  500 mg Intravenous Q24H     metroNIDAZOLE  500 mg Intravenous Q12H     mirtazapine  3.75 mg Oral At Bedtime     pantoprazole  40 mg Oral QAM AC     polyethylene glycol  17 g Oral Daily     senna-docusate  1 tablet Oral Daily     Continuous Infusions:    sodium chloride 50 mL/hr at 11/02/21 0200     PRN Meds:.acetaminophen, albuterol, alum & mag hydroxide-simethicone, benzonatate, bisacodyl, bisacodyl, magnesium hydroxide, melatonin, ondansetron, senna-docusate, sodium phosphate    Objective:  Vital signs in last 24 hours:  Temp:  [97.8  F (36.6  C)-100  F (37.8  C)] 97.8  F (36.6  C)  Pulse:  [] 76  Resp:  [16-28] 28  BP: (102-134)/(53-64) 102/53  FiO2 (%):  [50 %] 50 %  SpO2:  [92 %-97 %] 97 %      Intake/Output Summary (Last 24 hours) at 11/2/2021 0810  Last data filed at 11/1/2021 1800  Gross per 24 hour   Intake 140 ml   Output 400 ml   Net -260 ml       Physical Exam:  General: Not in obvious distress.  HEENT: Normocephalic, supple neck  Chest: Decreased but clear to auscultation bilateral anteriorly, bilateral crackles  Heart: S1S2 normal, regular  Abdomen: Soft. NT, ND. Bowel sounds- active.  Extremities: No legs swelling  Neuro: alert and awake,  moves all extremities    Lab Results:(I have personally reviewed the results)    Recent Results (from the past 24 hour(s))   Extra Red Top Tube    Collection Time: 11/01/21  8:16 AM   Result Value Ref Range    Hold Specimen JIC    Lactic Acid STAT    Collection Time: 11/01/21  9:39 PM   Result Value Ref Range    Lactic Acid 1.4 0.7 - 2.0 mmol/L   Blood gas arterial    Collection Time: 11/01/21 11:08 PM   Result Value Ref Range    pH Arterial 7.28 (L) 7.37 - 7.44    pCO2 Arterial 55 (H) 35 - 45 mm Hg    pO2 Arterial 58 (L) 75 - 85 mm Hg    Bicarbonate Arterial 23 23 - 29 mmol/L    O2 Sat, Arterial 91.3 (L) 95.0 - 96.0 %    Oxyhemoglobin 88.8 (L) 95.0 - 96.0 %    Base Excess/Deficit (+/-) -1.2   mmol/L    Ventilation Mode Bipap     Rate 16 rr/min    FIO2 50     PSV 14 cm H2O    Peep 8 cm H2O    Sample Stabilized Temperature 37.0 degrees C   Comprehensive metabolic panel    Collection Time: 11/02/21  5:27 AM   Result Value Ref Range    Sodium 144 136 - 145 mmol/L    Potassium 5.2 (H) 3.5 - 5.0 mmol/L    Chloride 109 (H) 98 - 107 mmol/L    Carbon Dioxide (CO2) 27 22 - 31 mmol/L    Anion Gap 8 5 - 18 mmol/L    Urea Nitrogen 28 8 - 28 mg/dL    Creatinine 1.51 (H) 0.70 - 1.30 mg/dL    Calcium 8.9 8.5 - 10.5 mg/dL    Glucose 96 70 - 125 mg/dL    Alkaline Phosphatase 142 (H) 45 - 120 U/L    AST 21 0 - 40 U/L    ALT <9 0 - 45 U/L    Protein Total 5.4 (L) 6.0 - 8.0 g/dL    Albumin 3.2 (L) 3.5 - 5.0 g/dL    Bilirubin Total 0.5 0.0 - 1.0 mg/dL    GFR Estimate 39 (L) >60 mL/min/1.73m2   CBC with platelets    Collection Time: 11/02/21  5:27 AM   Result Value Ref Range    WBC Count 25.6 (H) 4.0 - 11.0 10e3/uL    RBC Count 2.34 (L) 4.40 - 5.90 10e6/uL    Hemoglobin 7.8 (L) 13.3 - 17.7 g/dL    Hematocrit 27.4 (L) 40.0 - 53.0 %     (H) 78 - 100 fL    MCH 33.3 (H) 26.5 - 33.0 pg    MCHC 28.5 (L) 31.5 - 36.5 g/dL    RDW 17.2 (H) 10.0 - 15.0 %    Platelet Count 573 (H) 150 - 450 10e3/uL         Serum Glucose range:   Recent Labs    Lab 2108 10/31/21  0413 10/30/21  08   GLC 96 94 92 87     ABG:   Recent Labs   Lab 21   PH 7.28*   PCO2 55*   PO2 58*   HCO3 23   O2PER 50     CBC:   Recent Labs   Lab 2108 10/31/21  0413   WBC 25.6* 32.6* 26.5*   HGB 7.8* 8.2* 8.2*   HCT 27.4* 28.5* 27.9*   * 117* 116*   * 672* 675*     Chemistry:   Recent Labs   Lab 11/02/21  0527 11/01/21  0808 10/31/21  0413 10/30/21  0838 10/29/21  2310    143 143   < > 141   POTASSIUM 5.2* 4.9 4.8   < > 4.9   CHLORIDE 109* 109* 107   < > 103   CO2 27 27 29   < > 29   BUN 28 28 24   < > 36*   CR 1.51* 1.28 1.07   < > 1.61*   GFRESTIMATED 39* 48* 60*   < > 36*   CIRA 8.9 9.2 9.3   < > 10.0   PROTTOTAL 5.4* 5.7*  --   --  6.4   ALBUMIN 3.2* 3.4*  --   --  3.9   AST   --   --  19   ALT <9 <9  --   --  <9   ALKPHOS 142* 146*  --   --  122*   BILITOTAL 0.5 0.5  --   --  0.6    < > = values in this interval not displayed.     Coags:  No results for input(s): INR, PROTIME, PTT in the last 168 hours.    Invalid input(s): APTT  Cardiac Markers:  Recent Labs   Lab 10/30/21  0838   TROPONINI 0.04        Chest XR,  PA & LAT    Result Date: 10/30/2021  EXAM: XR CHEST 2 VW LOCATION: [facility] DATE/TIME: 10/30/2021 12:01 AM INDICATION: fatigue COMPARISON: 2019     IMPRESSION: Heart size prominent on this AP view but unchanged. Pulmonary vascularity normal. Subsegmental atelectasis or scarring in the bases. Small bilateral pleural effusions posteriorly in the costophrenic angles. Upper lung fields are clear. Pacemaker lead tips right atrium and right ventricle. Clips upper abdomen.    Echocardiogram Complete    Result Date: 10/30/2021  [id number]^[email or web address]^KAIDEN^[place] [address]  Name: GEE OGDEN MRN: [id number] : 1928 Study Date: 10/30/2021 10:42 AM Age: 89 yrs Gender: Male Patient Location:  [facility] Reason For Study: Chest Discomfort Ordering Physician: KAIDEN LUCAS Performed By: JUANA  BSA: 2.2 m2 Height: 74 in Weight: 203 lb HR: 78 ______________________________________________________________________________ Procedure Definity (NDC #51436-263) given intravenously. ______________________________________________________________________________ Interpretation Summary  Definity contrast utilized The left ventricle is mildly dilated. There is mild to moderate concentric left ventricular hypertrophy. LV systolic function appears lower limits of normal The visual ejection fraction is 50-55%. Septal wall motion abnormality may reflect pacemaker activation. TAPSE is normal, which is consistent with normal right ventricular systolic function. Pacemaker lead in the right heart There is mild (1+) tricuspid regurgitation. Estimate of RV systolic pressure is elevated at 54mmhg plus right atrial pressure Mild to moderate valvular aortic stenosis.Peak velocity 2.6 m/s,mean gradient 14mmhg.2D visualization of the aortic valve suggests the potential for more significant aortic stenosis then calculated by doppler examination There is mild (1+) aortic regurgitation. The aortic root is moderately dilated.Measures approxmately 4.6cm Mild to moderate enlargement of the proximal ascending aorta measures approximately 4.4cm Consider CT,GABE or MRI (if pacer compatible) to further define the aortic valve and thoracic aorta No prior study available for comparison ______________________________________________________________________________ Left Ventricle The left ventricle is mildly dilated. There is mild to moderate concentric left ventricular hypertrophy. Diastolic Doppler findings (E/E' ratio and/or other parameters) suggest left ventricular filling pressures are normal. The visual ejection fraction is 50-55%. LV systolic function appears lower limits of normal. Septal wall motion abnormality may reflect pacemaker  activation.  Right Ventricle The right ventricle is not well visualized. The right ventricle is mildly dilated. TAPSE is normal, which is consistent with normal right ventricular systolic function. The right ventricular systolic function is normal. There is a pacemaker lead in the right ventricle.  Atria The left atrium is mild to moderately dilated. Pacer wire in right atrium. Right atrium not well visualized. The right atrium is mildly dilated.  Mitral Valve The mitral valve leaflets appear thickened, but open well. There is moderate mitral annular calcification. There is mild (1+) mitral regurgitation.  Tricuspid Valve There is mild (1+) tricuspid regurgitation. Moderate (46-55mmHg) pulmonary hypertension is present. The right ventricular systolic pressure is approximated at 53.5 mmHg plus the right atrial pressure.  Aortic Valve Moderate to severe aortic valve leaflet calcification. There is mild (1+) aortic regurgitation. Mild to moderate valvular aortic stenosis.  Pulmonic Valve There is mild (1+) pulmonic valvular regurgitation.  Vessels The aortic root is moderately dilated.Measures approxmately 4.6cm. Mild to moderate enlargement of the proximal ascending aorta measures approximately 4.4cm. IVC diameter <2.1 cm collapsing >50% with sniff suggests a normal RA pressure of 3 mmHg.  Pericardium Trivial pericardial effusion.  ______________________________________________________________________________ MMode/2D Measurements & Calculations IVSd: 1.4 cm LVIDd: 6.9 cm LVIDs: 4.0 cm LVPWd: 1.4 cm  FS: 42.1 % LV mass(C)d: 479.8 grams LV mass(C)dI: 219.4 grams/m2 Ao root diam: 4.6 cm LA dimension: 4.6 cm asc Aorta Diam: 4.7 cm LA/Ao: 1.0 LVOT diam: 3.0 cm LVOT area: 7.2 cm2 RWT: 0.40  Time Measurements MM HR: 74.0 BPM  Doppler Measurements & Calculations MV E max luzmaria: 67.3 cm/sec MV A max luzmaria: 90.3 cm/sec MV E/A: 0.75 MV dec slope: 326.6 cm/sec2 MV dec time: 0.21 sec Ao V2 max: 232.4 cm/sec Ao max P.0 mmHg Ao V2  mean: 177.2 cm/sec Ao mean P.3 mmHg Ao V2 VTI: 54.3 cm DEEPIKA(I,D): 2.5 cm2 DEEPIKA(V,D): 3.2 cm2 AI P1/2t: 518.4 msec LV V1 max P.2 mmHg LV V1 max: 102.3 cm/sec LV V1 VTI: 19.1 cm  SV(LVOT): 137.7 ml SI(LVOT): 63.0 ml/m2 PA acc time: 0.07 sec PI end-d indio: 132.7 cm/sec TR max indio: 365.9 cm/sec TR max P.5 mmHg AV Indio Ratio (DI): 0.44 DEEPIKA Index (cm2/m2): 1.2 E/E' av.9 Lateral E/e': 6.7 Medial E/e': 11.0  ______________________________________________________________________________ Report approved by: Trev Jason 10/30/2021 12:53 PM       XR Chest Port 1 View    Result Date: 2021  EXAM: XR CHEST PORT 1 VIEW LOCATION: [facility] DATE/TIME: 2021 11:10 AM INDICATION: persistent hypoxia COMPARISON: 10/29/2021     IMPRESSION: Stable position multilead left subclavian pacemaker. Stable heart size. There is cephalization of blood flow, and small pleural effusions, increased from previous. These findings suggest CHF. Also, there is new left mid to inferior chest opacity which could represent asymmetric pulmonary edema, or pneumonia.    CT Abdomen Pelvis w Contrast    Result Date: 10/30/2021  EXAM: CT ABDOMEN PELVIS W CONTRAST LOCATION: [facility] DATE/TIME: 10/30/2021 12:11 AM INDICATION: Leukocytosis and fatigue COMPARISON: 2021 TECHNIQUE: CT scan of the abdomen and pelvis was performed following injection of IV contrast. Multiplanar reformats were obtained. Dose reduction techniques were used. CONTRAST: isovue 370 75ml FINDINGS: LOWER CHEST: Bibasilar atelectasis or infiltrate and small pleural effusions. Cardiac leads. HEPATOBILIARY: Cholecystectomy. PANCREAS: Fatty atrophy. SPLEEN: Splenomegaly, 18.4 cm. ADRENAL GLANDS: Stable. KIDNEYS/BLADDER: Small renal cysts. BOWEL: Normal caliber. Diverticulosis. Postsurgical change sigmoid colon. Rectal wall thickening not excluded. LYMPH NODES: Normal. VASCULATURE: Atherosclerotic vascular  calcification. 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 Procedural changes. Mild periaortic stranding similar. Ectatic iliac vasculature. PELVIC ORGANS: Prostatectomy. Presacral edema. MUSCULOSKELETAL: Fat-containing right inguinal hernia. Degenerative change osseous structures. Tiny helical hernia containing fat and knuckle of nondistended small bowel.     IMPRESSION: 1.  Bibasilar atelectasis or infiltrate and small pleural effusions. 2.  Splenomegaly. 3.  The wall thickening not excluded. Correlate for proctitis. 4.  Diverticulosis. 5.  Remainder similar to previous.    CT Head w/o Contrast    Result Date: 10/31/2021  EXAM: CT HEAD W/O CONTRAST LOCATION: [facility] DATE/TIME: 10/31/2021 1:35 PM INDICATION: Weakness. Mild proliferative disorder. COMPARISON: Head CT 08/12/2019 TECHNIQUE: Routine CT Head without IV contrast. Multiplanar reformats. Dose reduction techniques were used. FINDINGS: INTRACRANIAL CONTENTS: No intracranial hemorrhage, extraaxial collection, or mass effect.  No CT evidence of acute infarct. Moderate presumed chronic small vessel ischemic changes. Moderate generalized volume loss. No hydrocephalus. VISUALIZED ORBITS/SINUSES/MASTOIDS: No intraorbital abnormality. No paranasal sinus mucosal disease. No middle ear or mastoid effusion. BONES/SOFT TISSUES: No acute abnormality.     IMPRESSION: 1.  No CT evidence for acute intracranial process. 2.  Brain atrophy and presumed chronic microvascular ischemic changes as above. 3.  No change.      Latest radiology report personally reviewed.    Note created using dragon voice recognition software so sounds alike errors may have escaped editing.    11/02/2021   SADE RAMSEY MD  HOSPITALIST, [facility]  PAGER NO. [phone]

## 2021-11-02 NOTE — SIGNIFICANT EVENT
Significant Event Note    Time of event: 10:33 PM November 1, 2021    Description of event:  Informed by RN that respiratory therapist is recommending to switch to BiPAP.  Patient uses CPAP at home for sleep apnea.  On O2 at 8 L/min.  Will check ABG tonight.  Switch to BiPAP as recommended.  Primary team to manage in a.m.    Plan:  As above    Discussed with: bedside nurse    Allen Toth MD

## 2021-11-02 NOTE — PLAN OF CARE
Problem: Adult Inpatient Plan of Care  Goal: Plan of Care Review  Outcome: No Change  Goal: Patient-Specific Goal (Individualized)  Outcome: No Change  Goal: Absence of Hospital-Acquired Illness or Injury  Outcome: No Change  Intervention: Identify and Manage Fall Risk  Recent Flowsheet Documentation  Taken 11/2/2021 0025 by Anny Marie RN  Safety Promotion/Fall Prevention:   safety round/check completed   room organization consistent   nonskid shoes/slippers when out of bed  Intervention: Prevent Infection  Recent Flowsheet Documentation  Taken 11/2/2021 0025 by Anny Marie RN  Infection Prevention: single patient room provided  Goal: Optimal Comfort and Wellbeing  Outcome: No Change  Goal: Readiness for Transition of Care  Outcome: No Change     Problem: Gas Exchange Impaired  Goal: Optimal Gas Exchange  Outcome: No Change  Intervention: Optimize Oxygenation and Ventilation  Recent Flowsheet Documentation  Taken 11/2/2021 0025 by Anny Marie RN  Head of Bed (HOB) Positioning: HOB at 30-45 degrees     Problem: Oral Intake Inadequate COPD (Chronic Obstructive Pulmonary Disease)  Goal: Improved Nutrition Intake  Outcome: No Change     Problem: Risk for Delirium  Goal: Optimal Coping  Outcome: No Change  Goal: Improved Behavioral Control  Outcome: No Change  Goal: Improved Attention and Thought Clarity  Outcome: No Change  Goal: Improved Sleep  Outcome: No Change   Pt has been sleeping well throughout the shift. Pt cont to be on bipap and 02 remains between 93-95%. Pt has denied pain and anxiety. All due cares done and explained to the patient. Will cont to monitor and treat as needed.

## 2021-11-02 NOTE — PLAN OF CARE
Problem: Adult Inpatient Plan of Care  Goal: Patient-Specific Goal (Individualized)  Outcome: No Change  Goal: Absence of Hospital-Acquired Illness or Injury  Outcome: No Change  Intervention: Identify and Manage Fall Risk  Recent Flowsheet Documentation  Taken 11/1/2021 1719 by Keesha Villaseñor RN  Safety Promotion/Fall Prevention:   bed alarm on   room near nurse's station   room organization consistent   safety round/check completed  Taken 11/1/2021 0812 by Keesha Villaseñor RN  Safety Promotion/Fall Prevention:   bed alarm on   room near nurse's station   room organization consistent   safety round/check completed  Intervention: Prevent Skin Injury  Recent Flowsheet Documentation  Taken 11/1/2021 1725 by Keesha Villaseñor RN  Body Position:   turned   right  Taken 11/1/2021 1024 by Keesha Villaseñor RN  Body Position:   turned   right  Taken 11/1/2021 1009 by Keesha Villaseñor RN  Body Position: (had been on left side)   turned   supine  Intervention: Prevent Infection  Recent Flowsheet Documentation  Taken 11/1/2021 1719 by Keesha Villaseñor RN  Infection Prevention: single patient room provided  Taken 11/1/2021 0812 by Keesha Villaseñor RN  Infection Prevention: single patient room provided  Goal: Optimal Comfort and Wellbeing  Outcome: No Change     Problem: Adult Inpatient Plan of Care  Goal: Plan of Care Review  Outcome: Declining  Flowsheets (Taken 11/1/2021 1957)  Plan of Care Reviewed With:   patient   daughter     Problem: Gas Exchange Impaired  Goal: Optimal Gas Exchange  Outcome: Declining  Intervention: Optimize Oxygenation and Ventilation  Recent Flowsheet Documentation  Taken 11/1/2021 1725 by Keesha Villaseñor RN  Head of Bed (HOB) Positioning: HOB at 20-30 degrees  Taken 11/1/2021 1024 by Keesha Villaseñor RN  Head of Bed (HOB) Positioning: HOB at 20 degrees     Patient alert and oriented, pleasant, denies pain. O2 needs have increased throughout the day. Now on 6LNC. Appetite poor.  Turn Q2h. Daughter at bedside. Continuing to monitor.

## 2021-11-02 NOTE — PROCEDURES
THORACENTESIS PROCEDURE NOTE  (NON-OR)    PATIENT NAME:    Efe Huertas,  6/9/1928,  MRN# [id number]      Procedure Date: 11/2/2021   Performing Physician: Kai Christina MD    Procedure: ultrasound-guided right thoracentesis  Pre-Procedure Diagnosis: right pleural effusion  Post-Procedure Diagnosis: same as pre-procedure diagnosis    Indications: right pleural effusion    Estimated Blood Loss: minimal    Complications: none immediate  Specimen: 900 mL serosanguinous pleural fluid    Procedure Details/Findings:   The risks, benefits, potential complications, treatment options, and expected outcomes were discussed with the patient and his son Ameya. Discussed that the risks and potential complications include but are not limited to infection, bleeding, pain, pneumothorax, and death.  The patient concurred with the proposed plan, giving informed consent. The site of the procedure was properly noted/marked. The patient was identified as Efe Huertas with date of birth 6/9/1928 and the procedure verified as right thoracentesis. A time out was held and the above information confirmed.    The patient was properly positioned. The procedure was performed using sterile precautions, including chlorhexidine at the procedure site, cap, mask with face shield, sterile gown, and sterile gloves, a sterile drape, and a sterile ultrasound probe cover. Under ultrasound guidance, 10 mL of 1% lidocaine was infiltrated into the subcutaneous tissue and over the rib at the right posterior axillary line. A small skin nick was made with a scalpel. A thoracentesis catheter was introduced over a thoracentesis needle with serosanguinous return. The needle was removed and manual suction device used to obtain 900 mL of serosanguinous pleural fluid. The catheter was then removed and a dressing was applied to the wound. The procedure then concluded.    Condition: stable  Post-thoracentesis ultrasound showed near complete resolution of the  pleural fluid.  CXR ordered and is pending.   ________________________________________________________________________  Kai Christina MD  11/2/20214:20 PM

## 2021-11-02 NOTE — PROGRESS NOTES
Respiratory Care Note    BiPAP started per MD due to increasing O2 needs. Settings: 14/8 16 50%. Pt breathing low 30s, . Tolerating well. ABG drawn on those settings. Results pending.       Tono Mart, RT

## 2021-11-02 NOTE — PROGRESS NOTES
Impression and Plan     Impression:   1. Nonsustained ventricular tachycardia. This has been seen on prior device checks. He is not having any obvious symptoms or hemodynamic instability from this. He was noted to have normal coronary arteries many years ago.  2. Acute on chronic respiratory failure. Likely multifactorial, with underlying chronic obstructive pulmonary disease and suspected pneumonia certainly contributing. He may still be a bit fluid-overloaded, but he did improve with a trial of diuresis and now has worsened renal function.  3. Chronic congestive heart failure with preserved left ventricular ejection fraction, mostly recently noted to be 50%. Possibly still a bit fluid-overloaded.  4. Paroxysmal atrial fibrillation. [id number] score is at least 3.  5. Suggestion of aortic stenosis on echocardiogram. His physical examination is not consistent with significant aortic stenosis.  6. Dilated ascending thoracic aorta, stable compared to prior echocardiograms.  7. High-grade atrioventricular block and underlying left bundle branch block status post [employer] biventricular pacemaker and defibrillator placement on 2/8/2013. Normal device function.  8. Obstructive sleep apnea with history of CPAP noncompliance.  9. JAK2-positive myeloproliferative disorder.    Plan:    Will hold on additional diuresis for now, given worsened renal function    Discontinued maintenance IV fluids    Pulmonology consultation pending    If fluid-overload is still felt to be a contributor to his respiratory status, I would suggest placing a King catheter for accurate monitoring of I/Os and daily standing weights and a more aggressive diuretic regimen. It is difficult to know if his worsened renal function is from overdiuresis, or if he did not actually have effective diuresis. However, his respiratory status does seem improved.    Given his age and co-morbidities, lack of clear symptoms from his ventricular  tachycardia or anginal symptoms, and the fact that this has been seen on prior device interrogations, I would not consider an ischemic evaluation at this time    He previously was weaned off beta blocker therapy due to lightheadedness. As he is not clearly symptomatic from his ventricular tachycardia, we can continue to hold this.    He was previously taken off oral anticoagulation due to concern for falls.    Primary Cardiologist: Dr. Prashanth Bull with [facility]    [facility]     - breathing better, now just on nasal canula     Cardiac Diagnostics     ECG 10/29/2021: Personally reviewed and interpreted: A-sensed, BiV paced     Telemetry (personally reviewed): Mostly BiV-paced, underlying rhythm appears to be atrial fibrillation currently     Device interrogation 10/30/2021:  2 years battery life remaining  Several episodes of NSVT, occasional episodes of pacemaker-mediated tachycardia since last device check Dec 2020.  No sustained arrhythmias requiring therapy.  98% Bi-V paced     Most recent:  Echocardiogram 10/31/2021 (results reviewed):   The left ventricle is mildly dilated.  There is mild to moderate concentric left ventricular hypertrophy.  LV systolic function appears lower limits of normal  The visual ejection fraction is 50-55%.  Septal wall motion abnormality may reflect pacemaker activation.  TAPSE is normal, which is consistent with normal right ventricular systolic  function.  Pacemaker lead in the right heart  There is mild (1+) tricuspid regurgitation.  Estimate of RV systolic pressure is elevated at 54mmhg plus right atrial  pressure  Mild to moderate valvular aortic stenosis. Peak velocity 2.6 m/s,mean gradient  14mmhg. 2D visualization of the aortic valve suggests the potential for more  significant aortic stenosis then calculated by doppler examination  There is mild (1+) aortic regurgitation.  The aortic root is moderately dilated.Measures approxmately 4.6cm  Mild  "to moderate enlargement of the proximal ascending aorta measures  approximately 4.4cm    Physical Examination       /53 (BP Location: Left arm)   Pulse 76   Temp 97.8  F (36.6  C) (Oral)   Resp 28   Ht 1.88 m (6' 2\")   Wt 93.4 kg (206 lb)   SpO2 92%   BMI 26.45 kg/m          Wt Readings from Last 3 Encounters:   11/01/21 93.4 kg (206 lb)   09/09/19 101.3 kg (223 lb 6.4 oz)   10/11/19 102.8 kg (226 lb 9.6 oz)     Intake/Output Summary (Last 24 hours) at 11/2/2021 1226  Last data filed at 11/2/2021 1100  Gross per 24 hour   Intake 260 ml   Output 500 ml   Net -240 ml       General: somnolent but in no acute distress  HENT: external ears normal. Nares patent. Mucous membranes moist.  Eyes: perrla, extraocular muscles intact. No scleral icterus.   Neck: JVP not well seen today  Lungs: diminished breath sounds  COR:  regular rate and rhythm, No murmurs, rubs, or gallops  Abd: nondistended, BS present  Extrem: No edema         Imaging      CXR 11/1/2021 (report reviewed):  EXAM: XR CHEST PORT 1 VIEW  LOCATION: [facility]  DATE/TIME: 11/1/2021 11:10 AM     INDICATION: persistent hypoxia  COMPARISON: 10/29/2021                                                                      IMPRESSION: Stable position multilead left subclavian pacemaker. Stable heart size. There is cephalization of blood flow, and small pleural effusions, increased from previous. These findings suggest CHF. Also, there is new left mid to inferior chest   opacity which could represent asymmetric pulmonary edema, or pneumonia.    Lab Results   Lab Results   Component Value Date     11/02/2021    CO2 27 11/02/2021    BUN 28 11/02/2021     Lab Results   Component Value Date    WBC 25.6 11/02/2021    HGB 7.8 11/02/2021    HCT 27.4 11/02/2021     11/02/2021     11/02/2021     Lab Results   Component Value Date    INR 1.25 08/12/2019     Lab Results   Component Value Date     08/12/2019     Lab " Results   Component Value Date    TROPONINI 0.04 10/30/2021    TROPONINI 0.05 10/29/2021    TROPONINI <0.01 08/12/2019     Lab Results   Component Value Date    TSH 0.73 05/19/2021           Current Inpatient Scheduled Medications   Scheduled Meds:    heparin ANTICOAGULANT  5,000 Units Subcutaneous Q12H     hydroxyurea  500 mg Oral Daily     levofloxacin  500 mg Intravenous Q24H     metroNIDAZOLE  500 mg Intravenous Q12H     mirtazapine  3.75 mg Oral At Bedtime     pantoprazole  40 mg Oral QAM AC     polyethylene glycol  17 g Oral Daily     senna-docusate  1 tablet Oral Daily     Continuous Infusions:    sodium chloride 50 mL/hr at 11/02/21 0200            Medications Prior to Admission   Prior to Admission medications    Medication Sig Start Date End Date Taking? Authorizing Provider   acetaminophen (TYLENOL) 500 MG tablet [ACETAMINOPHEN (TYLENOL) 500 MG TABLET] Take 500-1,000 mg by mouth every 6 (six) hours as needed for pain. 8/12/19  Yes Provider, Historical   albuterol (PROAIR HFA;PROVENTIL HFA;VENTOLIN HFA) 90 mcg/actuation inhaler [ALBUTEROL (PROAIR HFA;PROVENTIL HFA;VENTOLIN HFA) 90 MCG/ACTUATION INHALER] Inhale 2 puffs every 6 (six) hours as needed for wheezing. 8/12/19  Yes Provider, Historical   celecoxib (CELEBREX) 200 MG capsule Take 200 mg by mouth 2 times daily as needed  5/18/21  Yes Provider, Historical   hydroxyurea (HYDREA) 500 mg capsule [HYDROXYUREA (HYDREA) 500 MG CAPSULE] Take 500 mg by mouth daily. Monday through Friday 8/12/19  Yes Provider, Historical   mirtazapine (REMERON) 7.5 MG tablet Take 3.75 mg by mouth At Bedtime  8/12/19  Yes Provider, Historical   senna-docusate (SENOKOT-S/PERICOLACE) 8.6-50 MG tablet Take 1 tablet by mouth daily   Yes Unknown, Entered By History   vitamin C (ASCORBIC ACID) 1000 MG TABS Take 1,000 mg by mouth daily   Yes Unknown, Entered By History   Vitamin D3 (CHOLECALCIFEROL) 125 MCG (5000 UT) tablet Take 125 mcg by mouth daily   Yes Unknown, Entered By History           Janes Khalil MD [facility]  Non-Invasive Cardiologist  [facility]  Pager [phone]

## 2021-11-02 NOTE — PROGRESS NOTES
ABG's drawn 11/01/21 at 23:08 on Bipap 14/8, rate 16, 50% were 7.28/55/58/23/91.3%.  Pt remains on Bipap 14/8, rate 16, 50% overnight. SPO2 92-95%. Liquicel in place to prevent breakdown.  RT will continue to follow.

## 2021-11-02 NOTE — PLAN OF CARE
Problem: Gas Exchange Impaired  Goal: Optimal Gas Exchange  Outcome: No Change   Patient off BIPAP this morning to 6L O2 per NC with sates in low to mid 90's.  Patient had OT this afternoon and was ultimately positioned on his left side.  Sats noted to drop and remain in the low 80's.  Patient changed to 8L oxymask and recovered to mid 90's after a few minutes.  Will continue to monitor.        Problem: Oral Intake Inadequate COPD (Chronic Obstructive Pulmonary Disease)  Goal: Improved Nutrition Intake  Outcome: No Change   Patient has poor appetite.  Ate about 25% of both breakfast and lunch.  IV SL.  Will continue to encourage PO intake.

## 2021-11-02 NOTE — PROGRESS NOTES
[facility] DIGESTIVE HEALTH PROGRESS NOTE      Subjective:              Overnight events noted. More desaturations and need for Bipap. On Bipap intermittently during the day today.      Objective:                Body mass index is 26.45 kg/m .  Vital signs in last 24 hrs;  Temp:  [97.8  F (36.6  C)-100  F (37.8  C)] 97.8  F (36.6  C)  Pulse:  [70-97] 76  Resp:  [20-28] 28  BP: (102-134)/(53-64) 102/53  FiO2 (%):  [50 %] 50 %  SpO2:  [82 %-97 %] 95 %    Physical Exam:   General: Short of breath appearing. Awake.   Cardiovascular: Regular rate.   Chest: Short of breath with speaking.   Abdomen: soft,   Neurologic: Alert, no focal defects.     Current Labs:  CMP Results: Recent Labs   Lab Test 11/02/21  0527      POTASSIUM 5.2*   CHLORIDE 109*   CO2 27   ANIONGAP 8   GLC 96   BUN 28   CR 1.51*   BILITOTAL 0.5   ALKPHOS 142*   ALT <9   AST 21      CBC  Recent Labs   Lab 11/02/21  0527 11/01/21  0808 10/31/21  0413 10/30/21  0838   WBC 25.6* 32.6* 26.5* 29.0*   RBC 2.34* 2.44* 2.40* 2.62*   HGB 7.8* 8.2* 8.2* 8.8*   HCT 27.4* 28.5* 27.9* 30.2*   * 117* 116* 115*   MCH 33.3* 33.6* 34.2* 33.6*   MCHC 28.5* 28.8* 29.4* 29.1*   RDW 17.2* 17.4* 17.2* 17.2*   * 672* 675* 781*     INRNo lab results found in last 7 days.   No results found for: LIPASE    Relevant Imaging:  No new abd imaging.     Assessment:       Efe Huertas is a 89 year old male with Jak2 myeloproliferative disorder, COPD on 2 L oxygen at baseline, CHF with defibrillator, JANETH, hx prostate CA admitted with generalized weakness and failure to thrive with 10-15 lb weight loss         # Dysphagia - to liquids and solids. Cause unclear. Motility disorder, stricture, malignancy, diverticulum all possibilities.      # Possible rectal wall thickening seen on CT.   - Last colonoscopy 2008.      # Acute and chronic hypoxic respiratory failure   # COPD  # Pneumonia  - on oxygen at baseline. Increased oxygen requirements currntly.   - Cough   -  Desaturates when supine and on left side.   - On abx      - On Bipap now intermittently  - Not optimized for an EGD/Flex sig at this time.    Plan:     - Will plan for EGD and flex sig once his respiratory status has returned to near baseline and he is ready for discharge. Not ready currently.   - Will follow along.   - Cont mgmt of resp failure and pneumonia.                Leighton Tony, DO  Thank you for the opportunity to participate in the care of this patient.   Please feel free to call me with any questions or concerns.  Phone number [phone].

## 2021-11-02 NOTE — TREATMENT PLAN
2020     Ariel Lawrence    : 1944 Sex: female   Age: 68 y.o. Chief Complaint   Patient presents with    Hyperlipidemia    Hypertension    Gastroesophageal Reflux    Depression    Hypothyroidism       HPI: A follow-up evaluation and management of chronic medical problems for this 68y.o. year-old female resident. No change in clinical status as noted per nursing staff. Current medication list reviewed. The patient is tolerating all medications well without adverse events or known side effects. This encounter was conducted via Doxy. knee. The patient is complaining of insomnia with mainly sleep initiation. ROS:   Const: Denies changes in appetite, chills, fever, night sweats and weight loss. Eyes:  Denies discharge, a recent change in visual acuity, blurred vision and double vision. ENMT: Denies discharge of the ears, hearing loss, pain of the ears. Denies mouth or throat symptoms. CV:  Denies chest pain, dyspnea on exertion, orthopnea, palpitations and PND  Resp: Denies chest pain, cough, SOB and wheezing. GI: Denies abdominal pain, constipation, diarrhea, heartburn, indigestion, nausea and vomiting. : Denies dysuria, frequency, hematuria, nocturia and urgency. Musculo: Denies arthralgias and myalgia  Skin:  Denies lesions, pruritus and rash. Neuro: Denies dizziness, lightheadedness, numbness, tingling and weakness. Psych:  Denies anxiety and depression  Endocrine: Denies anxiety and depression. Hema/Lymph: Denies hematologic symptoms  Allergy/Immuno:  Denies allergic/immunologic symptoms. Pertinent positives reviewed and noted    No current outpatient medications on file. Allergies not on file    No past medical history on file.   Social History     Socioeconomic History    Marital status: Unknown     Spouse name: Not on file    Number of children: Not on file    Years of education: Not on file    Highest education level: Not on file   Occupational History    Not on [facility]AT Treatment Plan    Patient Score: 16  Patient Acuity: 2    Clinical Indication for Therapy: bronchospasm and rhonchi on auscultation    Therapy Ordered: flutter valve, duo nebs Qid, oxygen    Assessment Summary: coarse with rhonchi, pneumonia, shallow breathing  Plan: cont with bipap overnight.  keep the nebs for now  per pulm.    Mery Hobbs, RT  11/2/2021     file   Social Needs    Financial resource strain: Not on file    Food insecurity     Worry: Not on file     Inability: Not on file    Transportation needs     Medical: Not on file     Non-medical: Not on file   Tobacco Use    Smoking status: Not on file   Substance and Sexual Activity    Alcohol use: Not on file    Drug use: Not on file    Sexual activity: Not on file   Lifestyle    Physical activity     Days per week: Not on file     Minutes per session: Not on file    Stress: Not on file   Relationships    Social connections     Talks on phone: Not on file     Gets together: Not on file     Attends [employer] service: Not on file     Active member of club or organization: Not on file     Attends meetings of clubs or organizations: Not on file     Relationship status: Not on file    Intimate partner violence     Fear of current or ex partner: Not on file     Emotionally abused: Not on file     Physically abused: Not on file     Forced sexual activity: Not on file   Other Topics Concern    Not on file   Social History Narrative    Not on file     No past surgical history on file. No family history on file. Exam: Const: Appears healthy and well developed. No signs of acute distress present. Eyes: PER. Skin: Skin is warm and dry. Musculo: Unchanged upon examination  Neuro: Alert and oriented X3. Cranial nerves grossly intact. Psych: Mood is normal.  Affect is normal.   Vital signs reviewed. No flowsheet data found. Plan Per Assessment:  Jesus Alberto Devlin was seen today for hyperlipidemia, hypertension, gastroesophageal reflux, depression and hypothyroidism. Diagnoses and all orders for this visit:    Essential hypertension    Gastroesophageal reflux disease, unspecified whether esophagitis present    Hypothyroidism, unspecified type    Hyperlipidemia, unspecified hyperlipidemia type    Depression, unspecified depression type      Continue current medical therapy.   Nursing staff to

## 2021-11-02 NOTE — CONSULTS
PULMONARY MEDICINE CONSULT  11/2/2021      Admit Date: 10/29/2021  CODE: No CPR- Pre-arrest intubation OK    Reason for Consult: acute on chronic respiratory failure with hypoxemia. CHF. Pleural effusions. COPD exacerbation.      Assessment/Plan:   89 year old man with history of COPD/emphysema, followed by Dr. Frank ([facility]), chronic hypoxemic respiratory failure on 2Lpm home O2, CAD, chronic leukocytosis 2/2 myeloproliferative disorder? HFpEF, presented on 10/30 with failure to thrive, weakness. Pulmonary consulted for worsening respiratory failure with hypoxemia and hypercapnia, requiring NIPPV and 8Lpm via oxymask.  Imaging reviewed in detail. Has bilateral pleural effusions and evidence of possible consolidated lung on bedside ultrasound. Likely has both HFpEF and COPD exacerbations.  Bedside thoracentesis performed with improvement in symptoms and oxygen requirement.  Has been getting some Lasix but without daily weights or strict intake/output monitoring, unclear if he's maintained net negative volume status (in fact his weight is up)    Plan:  - titrate FiO2 for goal SpO2 88-92%, avoid hyperoxia. Baseline O2 req is 2Lpm. OK to use BipAP prn increased work of breathing bu I'm not sure he needs this right now.   - duonebs qid, albuterol nebs prn  - 40mg IV methylpred q8h  - 40mg IV Lasix bid. Defer further dose adjustments to cardiology.   - place azul for strict intake/output monitoring - discussed with patient and his son.   - follow up pleural fluid studies  - follow up post-thora CXR  - agree with levaquin for CAP/bronchiti  - check sputum culture + resp viral panel  - encourage OOB, PT/OT, push IS  - will reassess LEFT pleural fluid tomorrow -> pocket was small on today's US and not enough to tap at bedside.    Will continue to follow.     Kai (Ben Christina MD  [facility] Pulmonary & Critical Care  Pager [phone]  Clinic [phone]  Fax [phone]                                                                                                                                                          HPI:   CCx: acute on chronic respiratory failure with hypoxemia. CHF. Pleural effusions. COPD exacerbation.     HPI: 89 year old man with history of COPD/emphysema, followed by Dr. Frank ([facility]), chronic hypoxemic respiratory failure on 2Lpm home O2, CAD, chronic leukocytosis 2/2 myeloproliferative disorder? HFpEF, presented on 10/30 with failure to thrive, weakness. Diagnosed with possible pneumonia, CHF. Cardiology consulted.   Pt provides very little history due to being hard of hearing, and mild language barrier. His son Ameya is at bedside and provides most of the history.   He's had increasing SOB prior to admission. No real inr in cough or sputum production. No hemoptysis. Lives at home with family support. He is minimally functional, needs a lot of assistance.  On 8Lpm O2 when I saw him and was requiring BiPAP for acute on chronic resp failure with hypercapnia and hypoxemia.    Bedside thora done today -> 900cc serosanguinous fluid removed with improvement in O2 requirement from 8 down to 5Lpm.                                                                                                                                                        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     Congestive heart failure (H) 5/17/2007    Overview:  Systolic with EF 30-35% on ECHO 5/17/2007      COPD, group B, 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 (H) 2/21/2013     Dizziness 8/12/2019     Episodic mood disorder (H) 5/22/2020     Left foot drop 6/12/2009     NICM (nonischemic cardiomyopathy) (H) 3/26/2009     Obstructive airway disease (H) 2/21/2013     JANETH  (obstructive sleep apnea) 10/30/2021     Polycythemia 10/30/2021     Prostate cancer (H) 10/30/2021     Splenomegaly 10/30/2021     Ventricular bigeminy        Past Surgical History  Past Surgical History:   Procedure Laterality Date     ABDOMINAL AORTIC ANEURYSM REPAIR       AS REPAIR 1 COLLAT ANKLE LIGMNT,PRIMARY       CHOLECYSTECTOMY       COLECTOMY WITH COLOSTOMY, COMBINED       EP ICD / PACEMAKER / LOOP RECORDER      Pacemaker placement     HERNIA REPAIR       HRW VEIN STRIPPER       IMPLANTED DEVICE       DE ANESTH,REMOVAL OF ADRENAL       PROSTATECTOMY       REPAIR BLADDER       TAKEDOWN COLOSTOMY         Allergies:  Allergies   Allergen Reactions     Blood-Group Specific Substance Other (See Comments)     Patient has an anti-C.  Blood product orders may be delayed.  Please draw one red top tube and two lavender top tubes for all Type and Screens/ Type and Crossmatch orders.      Thiopental Nausea and Vomiting       PTA medications:  Medications Prior to Admission   Medication Sig Dispense Refill Last Dose     acetaminophen (TYLENOL) 500 MG tablet [ACETAMINOPHEN (TYLENOL) 500 MG TABLET] Take 500-1,000 mg by mouth every 6 (six) hours as needed for pain.   Past Week at Unknown time     albuterol (PROAIR HFA;PROVENTIL HFA;VENTOLIN HFA) 90 mcg/actuation inhaler [ALBUTEROL (PROAIR HFA;PROVENTIL HFA;VENTOLIN HFA) 90 MCG/ACTUATION INHALER] Inhale 2 puffs every 6 (six) hours as needed for wheezing.   More than a month at Unknown time     celecoxib (CELEBREX) 200 MG capsule Take 200 mg by mouth 2 times daily as needed    10/29/2021 at am     hydroxyurea (HYDREA) 500 mg capsule [HYDROXYUREA (HYDREA) 500 MG CAPSULE] Take 500 mg by mouth daily. Monday through Friday   10/29/2021 at am     mirtazapine (REMERON) 7.5 MG tablet Take 3.75 mg by mouth At Bedtime    Past Week at pm     senna-docusate (SENOKOT-S/PERICOLACE) 8.6-50 MG tablet Take 1 tablet by mouth daily   10/29/2021 at am     vitamin C (ASCORBIC ACID) 1000 MG  TABS Take 1,000 mg by mouth daily   10/29/2021 at am     Vitamin D3 (CHOLECALCIFEROL) 125 MCG (5000 UT) tablet Take 125 mcg by mouth daily   10/29/2021 at am       Family Hx:  Family History   Family history unknown: Yes       Social Hx:  Social History     Socioeconomic History     Marital status:      Spouse name: Not on file     Number of children: Not on file     Years of education: Not on file     Highest education level: Not on file   Occupational History     Not on file   Tobacco Use     Smoking status: Former Smoker     Packs/day: 1.00     Years: 56.00     Pack years: 56.00     Types: Cigarettes     Quit date:      Years since quittin.8     Smokeless tobacco: Never Used   Substance and Sexual Activity     Alcohol use: Not Currently     Drug use: Never     Sexual activity: Not on file   Other Topics Concern     Not on file   Social History Narrative     Not on file     Social Determinants of Health     Financial Resource Strain:      Difficulty of Paying Living Expenses:    Food Insecurity:      Worried About Running Out of Food in the Last Year:      Ran Out of Food in the Last Year:    Transportation Needs:      Lack of Transportation (Medical):      Lack of Transportation (Non-Medical):    Physical Activity:      Days of Exercise per Week:      Minutes of Exercise per Session:    Stress:      Feeling of Stress :    Social Connections:      Frequency of Communication with Friends and Family:      Frequency of Social Gatherings with Friends and Family:      Attends [employer] Services:      Active Member of Clubs or Organizations:      Attends Club or Organization Meetings:      Marital Status:    Intimate Partner Violence:      Fear of Current or Ex-Partner:      Emotionally Abused:      Physically Abused:      Sexually Abused:        Exam/Data:     Vitals  /59 (BP Location: Left arm, Patient Position: Semi-Loyd's)   Pulse 79   Temp 98.1  F (36.7  C) (Oral)   Resp 24   Ht 1.88 m  "(6' 2\")   Wt 93.4 kg (206 lb)   SpO2 95%   BMI 26.45 kg/m       I/O last 3 completed shifts:  In: 350 [P.O.:350]  Out: 350 [Urine:350]  Weight change:   [unfilled]  EXAM:  /59 (BP Location: Left arm, Patient Position: Semi-Loyd's)   Pulse 79   Temp 98.1  F (36.7  C) (Oral)   Resp 24   Ht 1.88 m (6' 2\")   Wt 93.4 kg (206 lb)   SpO2 95%   BMI 26.45 kg/m      Intake/Output last 3 shifts:  I/O last 3 completed shifts:  In: 350 [P.O.:350]  Out: 350 [Urine:350]  Intake/Output this shift:  No intake/output data recorded.    Physical Exam  Gen: awake, alert, oriented, mild resp distress  HEENT: NT, no ARMIDA  CV: RRR, no m/g/r  Resp: diminished at bases. Few scattered rhonchi /wheezing. Tachypneic. No accessory muscle use.   Abd: soft, nontender, BS+  Skin: no rashes or lesions  Ext: no edema  Neuro: PERRL, nonfocal exam    ROS: A complete 10-system review of systems was obtained and is negative with the exception of what is noted in the history of present illness.    Medications:       furosemide  40 mg Intravenous Q12H     heparin ANTICOAGULANT  5,000 Units Subcutaneous Q12H     hydroxyurea  500 mg Oral Daily     ipratropium - albuterol 0.5 mg/2.5 mg/3 mL  3 mL Nebulization Q6H     levofloxacin  500 mg Intravenous Q24H     methylPREDNISolone  40 mg Intravenous Q8H     metroNIDAZOLE  500 mg Intravenous Q12H     mirtazapine  3.75 mg Oral At Bedtime     pantoprazole  40 mg Oral QAM AC     polyethylene glycol  17 g Oral Daily     senna-docusate  1 tablet Oral Daily         DATA  All laboratory and radiology has been personally reviewed by myself today.  Recent Labs   Lab 11/02/21 0527   WBC 25.6*   HGB 7.8*   HCT 27.4*   *     Recent Labs   Lab 11/02/21 0527 11/01/21  0808 10/31/21  0413 10/30/21  0838 10/29/21  2310    143 143   < > 141   CO2 27 27 29   < > 29   BUN 28 28 24   < > 36*   ALKPHOS 142* 146*  --   --  122*   ALT <9 <9  --   --  <9   AST 21 21  --   --  19    < > = values in this " interval not displayed.       Micro  PCT normal  Blood NGTD  Urine NG    Pleural fluid 11/2   Studies pending       PFT DATA   From [facility] pulm office visit note, march 2021:  Date FEV1 FVC Ratio   9/11/13 1.84 (60 %) 3.57 (82%) 52   10/2/15 1.61 (58%) 3.30 (83%) 49   12/2/16 1.58(59) 3.14(82) 50   11/2/17 1.45(53) 3.26(83) 44         IMAGING:   Personally reviewed

## 2021-11-02 NOTE — PROGRESS NOTES
Care Management Follow Up    Length of Stay (days): 2    Expected Discharge Date: 11/05/2021       Concerns to be Addressed:   Alteration in respiratory status requiring IV Levaquin, IV Flagyl, IV Lasix every 12 hours and increased supplemental oxygen at 8 liters (now on BiPAP).   Will need to be vaccinated for COVID-19 prior to discharge.  Patient plan of care discussed at interdisciplinary rounds: Yes    Anticipated Discharge Disposition:  Transitional care (TCU) is recommended.      Anticipated Discharge Services:  Daily therapy and skilled nursing. May need oxygen weaning also (typically on oxygen at 2 liters at baseline).   Anticipated Discharge DME:  Per therapy (if indicated).    Patient/family educated on Medicare website which has current facility and service quality ratings:  yes  Education Provided on the Discharge Plan:  Per team  Patient/Family in Agreement with the Plan:  Yes    Referrals Placed by CM/SW:  See below  Private pay costs discussed: Not applicable     Additional Information:  Patient lives with his son at baseline. His son provides care for patient who is ordinarily on oxygen at 2 liters. However, patient is now requiring heavy/max assist of two to transfer and transitional care is recommended. OF NOTE: patient has not been vaccinated for COVID-19. He will need a vaccination in order to go to a transitional care. His daughter Iliana is primary family contact.   11:57 AM:  Left a message for patient's daughter Iliana to confirm COVID-19 vaccination status.  TCU referrals have been sent to:    [facility]    [facility] (Declined)     Lisa Raymundo RN

## 2021-11-02 NOTE — PLAN OF CARE
Problem: Adult Inpatient Plan of Care  Goal: Plan of Care Review  Outcome: No Change     Problem: Gas Exchange Impaired  Goal: Optimal Gas Exchange  Outcome: No Change     Problem: Oral Intake Inadequate COPD (Chronic Obstructive Pulmonary Disease)  Goal: Improved Nutrition Intake  Outcome: No Change   Pt transferred from P3 alert,denies pain,he  requires repositioning every two but very uncomfortable on the left side.Currently on the BIPAP 50%Fio2.awaits blood gas draw.

## 2021-11-03 NOTE — PROGRESS NOTES
Care Management Follow Up    Length of Stay (days): 3    Expected Discharge Date: 11/05/2021       Concerns to be Addressed:   Alteration in respiratory status requiring IV Methylprednisolone, IV Levaquin, IV Flagyl, IV Lasix and increased supplemental oxygen, now on BiPAP.   Will need to be vaccinated for COVID-19 prior to discharge.  Patient plan of care discussed at interdisciplinary rounds: Yes    Follow up from rounds/notes:   ultrasound-guided left thoracentesis completed.     Anticipated Discharge Disposition:  Transitional care (TCU) is recommended.      Anticipated Discharge Services:  Daily therapy and skilled nursing. May need oxygen weaning also (typically on oxygen at 2 liters at baseline).   Anticipated Discharge DME:  Per therapy (if indicated).    Patient/family educated on Medicare website which has current facility and service quality ratings:  yes  Education Provided on the Discharge Plan:  Per team  Patient/Family in Agreement with the Plan:  Yes    Referrals Placed by CM/SW:  See below  Private pay costs discussed: Not applicable     Additional Information:  Patient lives with his son at baseline. His son provides care for patient who is ordinarily on oxygen at 2 liters. However, patient is now requiring heavy/max assist of two to transfer and transitional care is recommended. OF NOTE: patient has not been vaccinated for COVID-19. He will need a vaccination in order to go to a transitional care. His daughter Iliana is primary family contact. On 11/2/21, writer left a message for patient's daughter Iliana to confirm COVID-19 vaccination status.  [facility]     Lisa Raymundo RN

## 2021-11-03 NOTE — PROGRESS NOTES
Pulmonary Progress Note  11/3/2021      Admit Date: 10/29/2021  CODE: No CPR- Pre-arrest intubation OK    Reason for Consult: acute on chronic respiratory failure with hypoxemia. CHF. Pleural effusions. COPD exacerbation.    Assessment/Plan:   89 year old man with history of COPD/emphysema, followed by Dr. Frank ([facility]), chronic hypoxemic respiratory failure on 2Lpm home O2, CAD, chronic leukocytosis 2/2 myeloproliferative disorder? HFpEF, presented on 10/30 with failure to thrive, weakness. Pulmonary consulted for worsening respiratory failure with hypoxemia and hypercapnia, requiring NIPPV and 8Lpm via oxymask.  Imaging reviewed in detail. Has bilateral pleural effusions and evidence of possible consolidated lung on bedside ultrasound. Likely has both HFpEF and COPD exacerbations.  Bedside thoracentesis performed with improvement in symptoms and oxygen requirement. Total of 1.8L serosanguinous fluid removed so far, 900cc from each side.   The right pleural fluid is c/w transudative process with low pleural LDH and protein to serum LDH protein ratios, likely due to volume overload from CHF.   Accurate ins/outs, daily weights continue to be an issue.  Clinicaly looks much better and approaching baseline home O2 requirement of 2Lpm.       Plan:  - titrate FiO2 for goal SpO2 88-92%, avoid hyperoxia. Baseline O2 req is 2Lpm.   - duonebs qid, albuterol nebs prn  - Continue 40mg IV methylpred q8h  - defer diuretics to cardiology service.   - follow up RIGHT pleural fluid cytology, cultures  - agree with levaquin for CAP/bronchitis. Would give 5-7 days total.   - encourage OOB, PT/OT, push IS  - consider addressing code status at some point.      Will continue to follow.     Kai Christina MD (Avi)  [facility] Pulmonary & Critical Care  Pager [phone]  Clinic [phone]  Fax [phone]     Subjective/Interim Events:   Looks much better.  Down to 4Lpm O2 via nc    Right thora done today ->  "900cc serosanguinous fluid removed, see separate procedure note    Wore bipap overnight    UOP not accurately recorded and no daily weights.       Medications:       furosemide  40 mg Intravenous Q6H     heparin ANTICOAGULANT  5,000 Units Subcutaneous Q12H     hydroxyurea  500 mg Oral Daily     ipratropium - albuterol 0.5 mg/2.5 mg/3 mL  3 mL Nebulization 4x daily     levofloxacin  250 mg Intravenous Q24H     methylPREDNISolone  40 mg Intravenous Q8H     metroNIDAZOLE  500 mg Intravenous Q12H     mirtazapine  3.75 mg Oral At Bedtime     pantoprazole  40 mg Oral QAM AC     polyethylene glycol  17 g Oral Daily     senna-docusate  1 tablet Oral Daily         Exam/Data:   Vitals  /59 (BP Location: Right arm)   Pulse 71   Temp 97.6  F (36.4  C) (Oral)   Resp 18   Ht 1.88 m (6' 2\")   Wt 93.4 kg (206 lb)   SpO2 95%   BMI 26.45 kg/m       I/O last 3 completed shifts:  In: 894 [P.O.:894]  Out: 850 [Urine:850]  Weight change:   [unfilled]  FiO2 (%): (S) 40 %  Resp: 18      EXAM:  Physical Exam  Gen: awake, alert, oriented, no distress  HEENT: NT, no ARMIDA  CV: RRR, no m/g/r  Resp: diminished at bases. No wheezing or rhonchi.   Abd: soft, nontender, BS+  Skin: no rashes or lesions  Ext: no edema  Neuro: PERRL, nonfocal exam    ROS:  A 10-system review was obtained and is negative with the exception of the symptoms noted above.    DATA:    Micro  PCT neg on 10/30  RVP neg for resp viruses  Covid, influenza A/B neg    Pleural fluid 11/2  Wbc 907, 8% lymph, 80% mono/mac, 12% PMN  Glucose 110    Ph 8.0  Protein 2.1  Cytology pending  Cultures pending, gram stain neg    Pleural fluid LDH/serum LDH 0.33  Pleural fluid protein/ serum total protein 0.36     IMAGING:   Chest XR,  PA & LAT    Result Date: 10/30/2021  EXAM: XR CHEST 2 VW LOCATION: [facility] DATE/TIME: 10/30/2021 12:01 AM INDICATION: fatigue COMPARISON: 08/12/2019     IMPRESSION: Heart size prominent on this AP view but " unchanged. Pulmonary vascularity normal. Subsegmental atelectasis or scarring in the bases. Small bilateral pleural effusions posteriorly in the costophrenic angles. Upper lung fields are clear. Pacemaker lead tips right atrium and right ventricle. Clips upper abdomen.    Echocardiogram Complete    Result Date: 10/30/2021  [id number]^[place] [address]  Name: GEE OGDEN MRN: [id number] : 1928 Study Date: 10/30/2021 10:42 AM Age: 89 yrs Gender: Male Patient Location: [facility] Reason For Study: Chest Discomfort Ordering Physician: KAIDEN LUCAS Performed By: JUANA  BSA: 2.2 m2 Height: 74 in Weight: 203 lb HR: 78 ______________________________________________________________________________ Procedure Definity (NDC #00908-442) given intravenously. ______________________________________________________________________________ Interpretation Summary  Definity contrast utilized The left ventricle is mildly dilated. There is mild to moderate concentric left ventricular hypertrophy. LV systolic function appears lower limits of normal The visual ejection fraction is 50-55%. Septal wall motion abnormality may reflect pacemaker activation. TAPSE is normal, which is consistent with normal right ventricular systolic function. Pacemaker lead in the right heart There is mild (1+) tricuspid regurgitation. Estimate of RV systolic pressure is elevated at 54mmhg plus right atrial pressure Mild to moderate valvular aortic stenosis.Peak velocity 2.6 m/s,mean gradient 14mmhg.2D visualization of the aortic valve suggests the potential for more significant aortic stenosis then calculated by doppler examination There is mild (1+) aortic regurgitation. The aortic root is moderately dilated.Measures approxmately 4.6cm Mild to moderate enlargement of the proximal ascending aorta measures approximately 4.4cm Consider CT,GABE or MRI (if pacer  compatible) to further define the aortic valve and thoracic aorta No prior study available for comparison ______________________________________________________________________________ Left Ventricle The left ventricle is mildly dilated. There is mild to moderate concentric left ventricular hypertrophy. Diastolic Doppler findings (E/E' ratio and/or other parameters) suggest left ventricular filling pressures are normal. The visual ejection fraction is 50-55%. LV systolic function appears lower limits of normal. Septal wall motion abnormality may reflect pacemaker activation.  Right Ventricle The right ventricle is not well visualized. The right ventricle is mildly dilated. TAPSE is normal, which is consistent with normal right ventricular systolic function. The right ventricular systolic function is normal. There is a pacemaker lead in the right ventricle.  Atria The left atrium is mild to moderately dilated. Pacer wire in right atrium. Right atrium not well visualized. The right atrium is mildly dilated.  Mitral Valve The mitral valve leaflets appear thickened, but open well. There is moderate mitral annular calcification. There is mild (1+) mitral regurgitation.  Tricuspid Valve There is mild (1+) tricuspid regurgitation. Moderate (46-55mmHg) pulmonary hypertension is present. The right ventricular systolic pressure is approximated at 53.5 mmHg plus the right atrial pressure.  Aortic Valve Moderate to severe aortic valve leaflet calcification. There is mild (1+) aortic regurgitation. Mild to moderate valvular aortic stenosis.  Pulmonic Valve There is mild (1+) pulmonic valvular regurgitation.  Vessels The aortic root is moderately dilated.Measures approxmately 4.6cm. Mild to moderate enlargement of the proximal ascending aorta measures approximately 4.4cm. IVC diameter <2.1 cm collapsing >50% with sniff suggests a normal RA pressure of 3 mmHg.  Pericardium Trivial pericardial effusion.   ______________________________________________________________________________ MMode/2D Measurements & Calculations IVSd: 1.4 cm LVIDd: 6.9 cm LVIDs: 4.0 cm LVPWd: 1.4 cm  FS: 42.1 % LV mass(C)d: 479.8 grams LV mass(C)dI: 219.4 grams/m2 Ao root diam: 4.6 cm LA dimension: 4.6 cm asc Aorta Diam: 4.7 cm LA/Ao: 1.0 LVOT diam: 3.0 cm LVOT area: 7.2 cm2 RWT: 0.40  Time Measurements MM HR: 74.0 BPM  Doppler Measurements & Calculations MV E max indio: 67.3 cm/sec MV A max indio: 90.3 cm/sec MV E/A: 0.75 MV dec slope: 326.6 cm/sec2 MV dec time: 0.21 sec Ao V2 max: 232.4 cm/sec Ao max P.0 mmHg Ao V2 mean: 177.2 cm/sec Ao mean P.3 mmHg Ao V2 VTI: 54.3 cm DEEPIKA(I,D): 2.5 cm2 DEEPIKA(V,D): 3.2 cm2 AI P1/2t: 518.4 msec LV V1 max P.2 mmHg LV V1 max: 102.3 cm/sec LV V1 VTI: 19.1 cm  SV(LVOT): 137.7 ml SI(LVOT): 63.0 ml/m2 PA acc time: 0.07 sec PI end-d indio: 132.7 cm/sec TR max indio: 365.9 cm/sec TR max P.5 mmHg AV Indio Ratio (DI): 0.44 DEEPIKA Index (cm2/m2): 1.2 E/E' av.9 Lateral E/e': 6.7 Medial E/e': 11.0  ______________________________________________________________________________ Report approved by: Trev Jason 10/30/2021 12:53 PM       CT Abdomen Pelvis w Contrast    Result Date: 10/30/2021  EXAM: CT ABDOMEN PELVIS W CONTRAST LOCATION: [facility] DATE/TIME: 10/30/2021 12:11 AM INDICATION: Leukocytosis and fatigue COMPARISON: 2021 TECHNIQUE: CT scan of the abdomen and pelvis was performed following injection of IV contrast. Multiplanar reformats were obtained. Dose reduction techniques were used. CONTRAST: isovue 370 75ml FINDINGS: LOWER CHEST: Bibasilar atelectasis or infiltrate and small pleural effusions. Cardiac leads. HEPATOBILIARY: Cholecystectomy. PANCREAS: Fatty atrophy. SPLEEN: Splenomegaly, 18.4 cm. ADRENAL GLANDS: Stable. KIDNEYS/BLADDER: Small renal cysts. BOWEL: Normal caliber. Diverticulosis. Postsurgical change sigmoid colon. Rectal wall thickening not excluded.  LYMPH NODES: Normal. VASCULATURE: Atherosclerotic vascular calcification. 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 Procedural changes. Mild periaortic stranding similar. Ectatic iliac vasculature. PELVIC ORGANS: Prostatectomy. Presacral edema. MUSCULOSKELETAL: Fat-containing right inguinal hernia. Degenerative change osseous structures. Tiny helical hernia containing fat and knuckle of nondistended small bowel.     IMPRESSION: 1.  Bibasilar atelectasis or infiltrate and small pleural effusions. 2.  Splenomegaly. 3.  The wall thickening not excluded. Correlate for proctitis. 4.  Diverticulosis. 5.  Remainder similar to previous.

## 2021-11-03 NOTE — PROGRESS NOTES
"RESPIRATORY CARE NOTE    Pt on V 60 BiPAP; tolerating well.  FiO2 decreased to 40%.  Duoneb given X1.  Breath sounds: clear; diminished RML, RLL.     Blood pressure 121/58, pulse 67, temperature 98.2  F (36.8  C), temperature source Oral, resp. rate 23, height 1.88 m (6' 2\"), weight 93.4 kg (206 lb), SpO2 100 %.       11/03/21 0248   Tech Time   $Tech Time (10 minute increments) 1   Mode: CPAP/ BiPAP/ AVAPS/ AVAPS AE   CPAP/BiPAP/ AVAPS/ AVAPS AE Mode BiPAP S/T   CPAP/BiPAP/Settings   $BIPAP/CPAP Therapy continuous   IPAP/EPAP (cmH2O) 14/8   Rate (breaths/min) 16   Oxygen (%) 50   Timed Inspiration (sec) 0.85   IPAP RISE  Settings (V60) 3   CPAP/BiPAP Patient Parameters   IPAP (cm H2O) 14 cmH2O   EPAP (cm H2O) 8 cmH2O   Pressure Support (cm H2O) 6 cmH2O   RR Total (breaths/min) 23 breaths/min   Vt (mL) 644 mL   Minute Ventilation (L/min) 15.4 L/min   Peak Inspiratory Pressure (cm H2O) 15 cmH2O   Pt.  Leak (L/min) 46 L/min   CPAP/BiPAP/AVAPS/AVAPS AE Alarms   High Pressure (cm H2O) 20 cmH2O   Low Pressure (cm H2O) 10   Low Pressure Delay (sec) 20 sec   Lo Min Vent 4   High Rate (breaths/min) 45 breaths/min   Low Rate (breaths/min) 10   Audible Alarm set at (Volume of Alarm) 7   CPAP/BiPAP/AVAPS/AVAPS AE Patient Assessment   Interface Face Mask - Large   Skin Integrity good  (liquicel in place)   Humidifier Checked N/A   RT Device Skin Assessment   Oxygen Delivery Device CPAP/BiPAP Mask   Site Appearance neck circumference Clean and dry   Site Appearance bridge of nose Clean and dry   Site Appearance occiput Clean and dry   Strap Tightness Finger Allowance between head and device strap   Skin Interventions Taken No adjustments needed      Fabian Zuniga, RT    "

## 2021-11-03 NOTE — PROGRESS NOTES
Impression and Plan     Impression:   1. Acute on chronic respiratory failure. Likely multifactorial, with underlying chronic obstructive pulmonary disease and suspected pneumonia certainly contributing. Status post right-sided thoracentesis on 11/2/2021. He may still be a bit fluid-overloaded.  2. Acute on chronic congestive heart failure with preserved left ventricular ejection fraction, mostly recently noted to be 50%.   3. Nonsustained ventricular tachycardia. This has been seen on prior device checks. He is not having any obvious symptoms or hemodynamic instability from this. He was noted to have normal coronary arteries many years ago.  4. Paroxysmal atrial fibrillation. [id number] score is at least 3.  5. Suggestion of aortic stenosis on echocardiogram. His physical examination is not consistent with significant aortic stenosis.  6. Dilated ascending thoracic aorta, stable compared to prior echocardiograms.  7. High-grade atrioventricular block and underlying left bundle branch block status post [employer] biventricular pacemaker and defibrillator placement on 2/8/2013. Normal device function.  8. Obstructive sleep apnea with history of CPAP noncompliance.  9. JAK2-positive myeloproliferative disorder.    Plan:    Increase diuresis with IV furosemide 40 mg every 6 hours and will given a one-time dose of oral metolazone 5 mg    Pulmonology following, appreciate assistance    Daily weights and strict I/Os with King catheter in place    Given his age and co-morbidities, lack of clear symptoms from his ventricular tachycardia or anginal symptoms, and the fact that this has been seen on prior device interrogations, I would not consider an ischemic evaluation at this time    He previously was weaned off beta blocker therapy due to lightheadedness. As he is not clearly symptomatic from his ventricular tachycardia, we can continue to hold this.    He was previously taken off oral anticoagulation due  "to concern for falls.    Primary Cardiologist: Dr. Prashanth Bull with [facility]    [facility]     - had right-sided thoracentesis yesterday, breathing improved    Cardiac Diagnostics     ECG 10/29/2021: Personally reviewed and interpreted: A-sensed, BiV paced    Device interrogation 10/30/2021:  2 years battery life remaining  Several episodes of NSVT, occasional episodes of pacemaker-mediated tachycardia since last device check Dec 2020.  No sustained arrhythmias requiring therapy.  98% Bi-V paced     Most recent:  Echocardiogram 10/31/2021 (results reviewed):   The left ventricle is mildly dilated.  There is mild to moderate concentric left ventricular hypertrophy.  LV systolic function appears lower limits of normal  The visual ejection fraction is 50-55%.  Septal wall motion abnormality may reflect pacemaker activation.  TAPSE is normal, which is consistent with normal right ventricular systolic  function.  Pacemaker lead in the right heart  There is mild (1+) tricuspid regurgitation.  Estimate of RV systolic pressure is elevated at 54mmhg plus right atrial  pressure  Mild to moderate valvular aortic stenosis. Peak velocity 2.6 m/s,mean gradient  14mmhg. 2D visualization of the aortic valve suggests the potential for more  significant aortic stenosis then calculated by doppler examination  There is mild (1+) aortic regurgitation.  The aortic root is moderately dilated.Measures approxmately 4.6cm  Mild to moderate enlargement of the proximal ascending aorta measures  approximately 4.4cm    Physical Examination       /64 (BP Location: Right arm)   Pulse 79   Temp 97.6  F (36.4  C) (Axillary)   Resp 18   Ht 1.88 m (6' 2\")   Wt 93.4 kg (206 lb)   SpO2 96%   BMI 26.45 kg/m          Wt Readings from Last 3 Encounters:   11/01/21 93.4 kg (206 lb)   09/09/19 101.3 kg (223 lb 6.4 oz)   10/11/19 102.8 kg (226 lb 9.6 oz)       Intake/Output Summary (Last 24 hours) at 11/3/2021 " 1115  Last data filed at 11/3/2021 0300  Gross per 24 hour   Intake 774 ml   Output 750 ml   Net 24 ml       General: pleasant male. No acute distress.   HENT: external ears normal. Nares patent. Mucous membranes moist.  Eyes: perrla, extraocular muscles intact. No scleral icterus.   Neck: JVP appears slightly elevated  Lungs: severely diminished breath sounds  COR:  regular rate and rhythm, No murmurs, rubs, or gallops  Abd: nondistended, BS present  Extrem: No edema         Imaging      CXR 11/2/2021 (report reviewed):  EXAM: XR CHEST PORT 1 VIEW  LOCATION: [facility]  DATE/TIME: 11/2/2021 5:00 PM     INDICATION: s/p right thoracentesis. eval interval change, r/o PTX  COMPARISON: 11/01/2021                                                                      IMPRESSION: Pacemaker with leads over the RA, RV, and coronary sinus. Small left pleural effusion and tiny right pleural effusion. Minimal interstitial prominence could represent some subtle pulmonary edema. Lungs otherwise expanded and clear. Slightly   improved since yesterday. No pneumothorax.    Lab Results   Lab Results   Component Value Date     11/03/2021    CO2 25 11/03/2021    BUN 34 11/03/2021     Lab Results   Component Value Date    WBC 30.0 11/03/2021    HGB 7.8 11/03/2021    HCT 26.7 11/03/2021     11/03/2021     11/03/2021     Lab Results   Component Value Date    INR 1.25 08/12/2019     Lab Results   Component Value Date     08/12/2019     Lab Results   Component Value Date    TROPONINI 0.04 10/30/2021    TROPONINI 0.05 10/29/2021    TROPONINI <0.01 08/12/2019     Lab Results   Component Value Date    TSH 0.73 05/19/2021           Current Inpatient Scheduled Medications   Scheduled Meds:    furosemide  40 mg Intravenous Q6H     heparin ANTICOAGULANT  5,000 Units Subcutaneous Q12H     hydroxyurea  500 mg Oral Daily     ipratropium - albuterol 0.5 mg/2.5 mg/3 mL  3 mL Nebulization 4x daily      levofloxacin  250 mg Intravenous Q24H     methylPREDNISolone  40 mg Intravenous Q8H     metolazone  5 mg Oral Once     metroNIDAZOLE  500 mg Intravenous Q12H     mirtazapine  3.75 mg Oral At Bedtime     pantoprazole  40 mg Oral QAM AC     polyethylene glycol  17 g Oral Daily     senna-docusate  1 tablet Oral Daily     Continuous Infusions:         Medications Prior to Admission   Prior to Admission medications    Medication Sig Start Date End Date Taking? Authorizing Provider   acetaminophen (TYLENOL) 500 MG tablet [ACETAMINOPHEN (TYLENOL) 500 MG TABLET] Take 500-1,000 mg by mouth every 6 (six) hours as needed for pain. 8/12/19  Yes Provider, Historical   albuterol (PROAIR HFA;PROVENTIL HFA;VENTOLIN HFA) 90 mcg/actuation inhaler [ALBUTEROL (PROAIR HFA;PROVENTIL HFA;VENTOLIN HFA) 90 MCG/ACTUATION INHALER] Inhale 2 puffs every 6 (six) hours as needed for wheezing. 8/12/19  Yes Provider, Historical   celecoxib (CELEBREX) 200 MG capsule Take 200 mg by mouth 2 times daily as needed  5/18/21  Yes Provider, Historical   hydroxyurea (HYDREA) 500 mg capsule [HYDROXYUREA (HYDREA) 500 MG CAPSULE] Take 500 mg by mouth daily. Monday through Friday 8/12/19  Yes Provider, Historical   mirtazapine (REMERON) 7.5 MG tablet Take 3.75 mg by mouth At Bedtime  8/12/19  Yes Provider, Historical   senna-docusate (SENOKOT-S/PERICOLACE) 8.6-50 MG tablet Take 1 tablet by mouth daily   Yes Unknown, Entered By History   vitamin C (ASCORBIC ACID) 1000 MG TABS Take 1,000 mg by mouth daily   Yes Unknown, Entered By History   Vitamin D3 (CHOLECALCIFEROL) 125 MCG (5000 UT) tablet Take 125 mcg by mouth daily   Yes Unknown, Entered By History          Janes Khalil MD [facility]  Non-Invasive Cardiologist  [facility]  Pager [phone]

## 2021-11-03 NOTE — PROGRESS NOTES
Pt is sleeping comfortably. Pt is on Bipap sat 99, HR 64, Neb treatment given as ordered. Breath sounds diminished throughout before and after. No respiratory distress at this time.  Pt has been on  2lpm  nasal canula all day.

## 2021-11-03 NOTE — PROGRESS NOTES
[facility] DIGESTIVE HEALTH PROGRESS NOTE      Subjective:             Breathing a little better. Rt thoracentesis yesterday with 900 ml out. On IV steroids and IV diuretics. Got nauseous with breakfast this morning. Felt food stuck in mid chest. Had to spit it out. Still SOB with speaking.      Objective:                Body mass index is 26.45 kg/m .  Vital signs in last 24 hrs;  Temp:  [97.6  F (36.4  C)-98.7  F (37.1  C)] 97.6  F (36.4  C)  Pulse:  [67-82] 71  Resp:  [18-30] 18  BP: ()/(51-64) 126/59  FiO2 (%):  [40 %-50 %] 40 %  SpO2:  [82 %-100 %] 95 %    Physical Exam:   General: Short of breath appearing. Awake.   Cardiovascular: Regular rate.   Chest: Short of breath with speaking.   Abdomen: soft,   Neurologic: Alert, no focal defects, hard of hearing.   Current Labs:  CMP Results: Recent Labs   Lab Test 11/03/21  0553 11/02/21 0527 11/02/21 0527      < > 144   POTASSIUM 5.2*   < > 5.2*   CHLORIDE 107   < > 109*   CO2 25   < > 27   ANIONGAP 10   < > 8   *   < > 96   BUN 34*   < > 28   CR 1.60*   < > 1.51*   BILITOTAL  --   --  0.5   ALKPHOS  --   --  142*   ALT  --   --  <9   AST  --   --  21    < > = values in this interval not displayed.      CBC  Recent Labs   Lab 11/03/21  0553 11/02/21  0527 11/01/21  0808 10/31/21  0413   WBC 30.0* 25.6* 32.6* 26.5*   RBC 2.33* 2.34* 2.44* 2.40*   HGB 7.8* 7.8* 8.2* 8.2*   HCT 26.7* 27.4* 28.5* 27.9*   * 117* 117* 116*   MCH 33.5* 33.3* 33.6* 34.2*   MCHC 29.2* 28.5* 28.8* 29.4*   RDW 17.2* 17.2* 17.4* 17.2*   * 573* 672* 675*     INRNo lab results found in last 7 days.   No results found for: LIPASE    Relevant Imaging:  No new abd imaging.     Assessment:       Efe Huertas is a 89 year old male with Jak2 myeloproliferative disorder, COPD on 2 L oxygen at baseline, CHF with defibrillator, JANETH, hx prostate CA admitted with generalized weakness and failure to thrive with 10-15 lb weight loss         # Dysphagia - to liquids and solids.  Cause unclear. Motility disorder, stricture, malignancy, diverticulum all possibilities.   - Ongoing issues with eating.     # Possible rectal wall thickening seen on CT.   - Last colonoscopy 2008.      # Acute and chronic hypoxic respiratory failure   # COPD  # HFpEF  - on oxygen at baseline. Increased oxygen requirements currently.   - Cough   - Desaturates when supine and on left side.   - On abx      - On Bipap now intermittently  - On IV steroids  - On IV diuresis  - Left sided thoracentesis today  - Not optimized for EGD/Flex sig currently.     Plan:     - Will plan for EGD and flex sig once his respiratory status has returned to near baseline and he is ready for discharge. Not ready currently.   - Will follow along.   - Cont mgmt of resp failure, volume overload            Leighton Tony, DO  Thank you for the opportunity to participate in the care of this patient.   Please feel free to call me with any questions or concerns.  Phone number [phone].

## 2021-11-03 NOTE — PLAN OF CARE
Problem: Functional Ability Impaired COPD (Chronic Obstructive Pulmonary Disease)  Goal: Optimal Level of Functional [place]  Outcome: No Change     Problem: Gas Exchange Impaired  Goal: Optimal Gas Exchange  Outcome: Improving     Problem: Respiratory Compromise COPD (Chronic Obstructive Pulmonary Disease)  Goal: Effective Oxygenation and Ventilation  Outcome: Improving

## 2021-11-03 NOTE — PROCEDURES
THORACENTESIS PROCEDURE NOTE  (NON-OR)    PATIENT NAME:    Efe Huertas,  6/9/1928,  MRN# [id number]      Procedure Date: 11/2/2021   Performing Physician: Kai Christina MD    Procedure: ultrasound-guided left thoracentesis  Pre-Procedure Diagnosis: left pleural effusion  Post-Procedure Diagnosis: same as pre-procedure diagnosis    Indications: left pleural effusion    Estimated Blood Loss: minimal    Complications: none immediate  Specimen: 900 mL serosanguinous pleural fluid    Procedure Details/Findings:   The risks, benefits, potential complications, treatment options, and expected outcomes were discussed with the patient and his son Ameya. Discussed that the risks and potential complications include but are not limited to infection, bleeding, pain, pneumothorax, and death.  The patient concurred with the proposed plan, giving informed consent. The site of the procedure was properly noted/marked. The patient was identified as Efe Huertas with date of birth 6/9/1928 and the procedure verified as left thoracentesis. A time out was held and the above information confirmed.    The patient was properly positioned. The procedure was performed using sterile precautions, including chlorhexidine at the procedure site, cap, mask with face shield, sterile gown, and sterile gloves, a sterile drape, and a sterile ultrasound probe cover. Under ultrasound guidance, 10 mL of 1% lidocaine was infiltrated into the subcutaneous tissue and over the rib at the left posterior axillary line. A small skin nick was made with a scalpel. A thoracentesis catheter was introduced over a thoracentesis needle with serosanguinous return. The needle was removed and manual suction device used to obtain 900 mL of serosanguinous pleural fluid. The catheter was then removed and a dressing was applied to the wound. The procedure then concluded.    Condition: stable  Post-thoracentesis ultrasound showed near complete resolution of the left  pleural fluid.  CXR ordered and is pending.   ________________________________________________________________________  Kai Christina MD  11/2/20214:20 PM

## 2021-11-03 NOTE — PROGRESS NOTES
"CLINICAL NUTRITION SERVICES - ASSESSMENT NOTE     Nutrition Prescription    RECOMMENDATIONS FOR MDs/PROVIDERS TO ORDER:  None    Malnutrition Status:    Severe    Recommendations already ordered by Registered Dietitian (RD):  Ensure enlive bid  Educated family on best tolerated foods in current condition    Future/Additional Recommendations:  Adjust supplement pending intake/acceptance/swallowing tolerance     REASON FOR ASSESSMENT  Efe Huertas is a/an 89 year old male assessed by the dietitian for Admission Nutrition Risk Screen for positive with 14-23 lb weight loss and eating poorly d/t decreased appetite    Pt presents with Acute on chronic respiratory failure: pna vs COPD exacerbation vs CHF. Dysphagia, JERED. Plan for EGD when respiratory better  Hx COPD, CHF, myeloproliferative disorder    NUTRITION HISTORY  Whole body progressive weakness x 2 months   Mild N/V  Progressive decline in eating at home d/t swallowing issues - foods \"getting stuck\" per daughter    CURRENT NUTRITION ORDERS  Diet: Level 4: Pureed Dysphagia Diet , 2 g Na- texture downgraded yesterday    Intake/Tolerance: improving yesterday with 1 meal at 50% and 1 meal at 100% with estimated intake 1197 kcal, 58 g protein total, meeting 50% of estimated needs.   Intake </= 25% prior    Noted pt got breakfast stuck in his esophagus this am, became nauseated and spit it out.   Pt just starting lunch and reports he is not hungry.  Family assist with feeding/meals. Dtr currently feeding pt lunch. Observed appropriateness of foods. Mashed potatoes were too dry and pt did not get ordered gravy to thin out. Dtr and pt agreed to add Ensure.     LABS  Labs reviewed  K+ 5.2, elevated, stable with yesterday  Bun/CR 34/1.6, increased    MEDICATIONS  Medications reviewed  Lasix iv q 6 hr, iv abx, solumedrol, metolazone, remeron, protonix, miralax daily, pericolace    ANTHROPOMETRICS  Height: 188 cm (6' 2\")  Most Recent Weight: 93.4 kg (206 lb) 11/1. " Thoracentesis yesterday  IBW: 86.3 kg  BMI: Overweight BMI 25-29.9  Weight History:   Wt Readings from Last 10 Encounters:   11/01/21 06/01/21 05/19/21 93.4 kg (206 lb)  100.9 kg (222 lb 6.4 oz)  97.6 kg (215 lb )   09/09/19 101.3 kg (223 lb 6.4 oz)   10/11/19 102.8 kg (226 lb 9.6 oz)   09/03/19 103 kg (227 lb 1.6 oz)   08/19/19 101.2 kg (223 lb)   Weight fluctuates with CHF. 7.2% weight loss x 2 months per family report    Dosing Weight: 93.4 kg    ASSESSED NUTRITION NEEDS  Estimated Energy Needs: 1561-9760 kcals/day (25 - 30 kcals/kg)  Justification: Maintenance  Estimated Protein Needs: 112-140 grams protein/day (1.2 - 1.5 grams of pro/kg)  Justification: Repletion  Estimated Fluid Needs: 7670-7972 mL/day (25 - 30 mL/kg)   Justification: Maintenance    PHYSICAL FINDINGS  See malnutrition section below.    MALNUTRITION:  % Weight Loss:  > 5% in 1 month (severe malnutrition)  % Intake:  </= 75% for >/= 1 month (severe malnutrition)  Subcutaneous Fat Loss/Muscle loss:  Dtr notices general losses in his face and body. None visible to writer but likely losses from baseline. Would consider mild to moderate based on report  Fluid Retention:  Moderate -with CHF    NUTRITION DIAGNOSIS  Malnutrition related to chronic illness as evidenced by intake < 75% x 2 months and 7.2% weight loss x 2 months    INTERVENTIONS  Implementation  Nutrition education for nutrition relationship to health/disease - encouraged ordering additional condiments/thinning foods out so they do not get stuck. Provided smooth, thin food suggestions.     Medical food supplement therapy - Ensure Enlive bid    Goals  Intake > 75% of estimated needs  Maintain weight     Monitoring/Evaluation  Progress toward goals will be monitored and evaluated per protocol.

## 2021-11-03 NOTE — PLAN OF CARE
Problem: Gas Exchange Impaired  Goal: Optimal Gas Exchange  Outcome: Improving   Patient had thoracentesis this shift with 900cc taken off right lung. Patient on 5L of oxygen via oxymask. Did try nasal cannula during dinner , during periods of rest patient had better oxygenation with mask. Patient denies shortness of breath was assisted to edge of bed per request, and saturation dropped to low to mid 80s.  IV antibiotics given as ordered. Patient to go on bi-pap over night.

## 2021-11-03 NOTE — PROGRESS NOTES
Daily Progress Note        CODE STATUS:  No CPR- Pre-arrest intubation OK    11/02/21  Assessment/Plan:  Efe Huertas is a 89 year old old male with past medical history of atrial fibrillation, COPD on 2 L nasal cannula, JANETH on CPAP, CHF, myeloproliferative disorder who presented to ED for evaluation of generalized malaise, fatigue, poor appetite and admitted for further management.    Acute on chronic respiratory failure with hypoxia; likely multifactorial-possible pneumonia, acute CHF with preserved EF, COPD exacerbation, pleural effusion, physical deconditioning.    Patient with history of COPD on 2-3 L nasal cannula at home and JANETH on CPAP  --On admission, CT  A/P reported bilateral atelectasis or infiltrate and small pleural effusions.  --On 11/1, CXR reported findings consistent with CHF  --On admission normal procalcitonin and normal lactic acid.  --On admission, elevated WBC count.   --On 11/1 night required BiPAP for worsening respiratory status.  --Appreciated pulmonary input, status post right thoracocentesis on 11/2  --Continue nasal cannula as tolerated during the day and BiPAP at night  --Patient initiated on DuoNeb, Solu-Medrol and diuretics  --On Levaquin, continue  --Incentive spirometry, bronchial hygiene  --Appreciated cardiology input.  --Monitor labs, intake/output, weight closely    Dysphagia, unspecified;  Possible rectal wall thickening on CT, possible proctitis;  --GI consulted, anticipating EGD and flex sig myeloscopy when respiratory status improves  --On IV Levaquin and Flagyl, continue.     Myeloproliferative disorder;  Microcytic anemia;  Leukocytosis;  --Appreciated oncology input, reported MDS versus myelofibrosis, may need bone marrow biopsy to clarify in future.  --On hydroxyurea, continue  --WBC elevated on admission, seems elevated since 5/2021, monitor labs closely    Acute kidney injury; ? Cause  --Home Celebrex on hold  --Admission CT abdomen/pelvis negative for  hydronephrosis  --On admission, received IV fluid with improvement on creatinine but concern for CHF and initiated on IV diuretics, creatinine trending up.  --Monitor labs, intake/output, weight closely  --Mild hyperkalemia, fairly stable, recheck in a.m.    H/o Afib;  --has ICD in place  --not anticoagulated due to h/o multiple falls in the past per cardiology note.     Nonsustained ventricular tachycardia on device check per cardiology;  --Appreciated cardiology input, reported no ischemic evaluation at this time, also reported previously weaned off beta-blocker due to lightheadedness and recommended continue to hold at this time.    DVT prophylaxis; subcu heparin      Disposition; pending  Barrier to discharge; respiratory failure     LOS: 2 days     Subjective:  Interval History: Patient seen and examined. Notes, labs, imaging reports personally reviewed.  Yesterday patient had thoracocentesis, discussed with pulmonologist, reported breathing better after thoracocentesis and was on 5 L OxiMax, overnight placed on a BiPAP.  During my visit earlier this morning patient was sleeping on the BiPAP, continuous oximetry showed saturation in higher 90s, heart rate controlled.  I did not wake up patient.  Discussed with patient's daughter at bedside in detail and answered all her questions to best of my knowledge.  Discussed with pulmonologist.  Discussed with nursing staffs.    Review of Systems:   As mentioned in subjective.    Patient Active Problem List   Diagnosis     A-fib (H)     NICM (nonischemic cardiomyopathy) (H)     CHB (complete heart block) (H)     Colovesical fistula     Congestive heart failure (H)     Encounter for servicing of implantable cardioverter-defibrillator (ICD) at end of battery life     Biventricular ICD (implantable cardioverter-defibrillator) in place     Obstructive airway disease (H)     Dizziness     Pre-syncope     Ventricular bigeminy     Constipation     Pneumonia of both lungs due to  infectious organism, unspecified part of lung     Chronic respiratory failure (H)     COPD, group B, 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 (H)     Episodic mood disorder (H)     Left foot drop     Mobitz type II atrioventricular block     Leukocytosis, unspecified type     JERED (acute kidney injury) (H)     Proctitis     Myeloproliferative disorder (H)     Splenomegaly     Prostate cancer (H)     Polycythemia     JANETH (obstructive sleep apnea)     Acute on chronic respiratory failure with hypoxia (H)       Scheduled Meds:    furosemide  40 mg Intravenous Q12H     heparin ANTICOAGULANT  5,000 Units Subcutaneous Q12H     hydroxyurea  500 mg Oral Daily     ipratropium - albuterol 0.5 mg/2.5 mg/3 mL  3 mL Nebulization 4x daily     levofloxacin  500 mg Intravenous Q24H     methylPREDNISolone  40 mg Intravenous Q8H     metroNIDAZOLE  500 mg Intravenous Q12H     mirtazapine  3.75 mg Oral At Bedtime     pantoprazole  40 mg Oral QAM AC     polyethylene glycol  17 g Oral Daily     senna-docusate  1 tablet Oral Daily     Continuous Infusions:    PRN Meds:.acetaminophen, albuterol, alum & mag hydroxide-simethicone, benzonatate, bisacodyl, bisacodyl, magnesium hydroxide, melatonin, ondansetron, senna-docusate, sodium phosphate    Objective:  Vital signs in last 24 hours:  Temp:  [97.6  F (36.4  C)-98.7  F (37.1  C)] 97.6  F (36.4  C)  Pulse:  [67-82] 78  Resp:  [18-30] 18  BP: ()/(51-64) 133/64  FiO2 (%):  [40 %-50 %] 40 %  SpO2:  [82 %-100 %] 97 %      Intake/Output Summary (Last 24 hours) at 11/2/2021 0810  Last data filed at 11/1/2021 1800  Gross per 24 hour   Intake 140 ml   Output 400 ml   Net -260 ml       Physical Exam:  General: Not in obvious distress.  HEENT: Normocephalic, on BiPAP mask  Chest: Decreased but clear to auscultation bilateral anteriorly, bilateral crackles  Heart: S1S2 normal, regular  Abdomen: Soft. NT, ND. Bowel sounds- active.  Extremities: No legs swelling  Neuro: Currently sleeping, at baseline follows  simple commands, hard of hearing, moves all extremities    Lab Results:(I have personally reviewed the results)    Recent Results (from the past 24 hour(s))   Pleural fluid Aerobic Bacterial Culture Routine with Gram Stain    Collection Time: 11/02/21  4:20 PM    Specimen: Pleural Cavity, Right; Pleural fluid   Result Value Ref Range    Gram Stain Result No organisms seen     Gram Stain Result 2+ WBC seen     Gram Stain Result 1+ Red blood cells seen    Glucose fluid    Collection Time: 11/02/21  4:20 PM   Result Value Ref Range    Glucose fluid 110 mg/dL   Lactate dehydrogenase fluid    Collection Time: 11/02/21  4:20 PM   Result Value Ref Range    Lactate dehydrogenase fluid 181 U/L   Protein fluid    Collection Time: 11/02/21  4:20 PM   Result Value Ref Range    Protein Total Fluid 2.1 g/dL   pH fluid    Collection Time: 11/02/21  4:20 PM   Result Value Ref Range    pH Fluid 8.0 pH   Cell Count Body Fluid    Collection Time: 11/02/21  4:20 PM   Result Value Ref Range    Color Brown (A) Colorless, Yellow    Clarity Hazy (A) Clear, Bloody    Total Nucleated Cells 907 /uL    RBC Count 5,000 /uL   Differential Body Fluid    Collection Time: 11/02/21  4:20 PM   Result Value Ref Range    % Neutrophils 12 %    % Lymphocytes 8 %    % Monocyte/Macrophages 80 %   Protein total    Collection Time: 11/02/21  4:41 PM   Result Value Ref Range    Protein Total 5.9 (L) 6.0 - 8.0 g/dL   Lactate Dehydrogenase    Collection Time: 11/02/21  4:41 PM   Result Value Ref Range    Lactate Dehydrogenase 542 (H) 125 - 220 U/L   Lactic Acid STAT    Collection Time: 11/03/21 12:43 AM   Result Value Ref Range    Lactic Acid 1.8 0.7 - 2.0 mmol/L   CBC with platelets    Collection Time: 11/03/21  5:53 AM   Result Value Ref Range    WBC Count 30.0 (H) 4.0 - 11.0 10e3/uL    RBC Count 2.33 (L) 4.40 - 5.90 10e6/uL    Hemoglobin 7.8 (L) 13.3 - 17.7 g/dL    Hematocrit 26.7 (L) 40.0 - 53.0 %     (H) 78 - 100 fL    MCH 33.5 (H) 26.5 - 33.0 pg     MCHC 29.2 (L) 31.5 - 36.5 g/dL    RDW 17.2 (H) 10.0 - 15.0 %    Platelet Count 548 (H) 150 - 450 10e3/uL   Basic metabolic panel    Collection Time: 11/03/21  5:53 AM   Result Value Ref Range    Sodium 142 136 - 145 mmol/L    Potassium 5.2 (H) 3.5 - 5.0 mmol/L    Chloride 107 98 - 107 mmol/L    Carbon Dioxide (CO2) 25 22 - 31 mmol/L    Anion Gap 10 5 - 18 mmol/L    Urea Nitrogen 34 (H) 8 - 28 mg/dL    Creatinine 1.60 (H) 0.70 - 1.30 mg/dL    Calcium 9.5 8.5 - 10.5 mg/dL    Glucose 152 (H) 70 - 125 mg/dL    GFR Estimate 37 (L) >60 mL/min/1.73m2         Serum Glucose range:   Recent Labs   Lab 11/03/21  0553 11/02/21  0527 11/01/21  0808 10/31/21  0413   * 96 94 92     ABG:   Recent Labs   Lab 11/01/21  2308   PH 7.28*   PCO2 55*   PO2 58*   HCO3 23   O2PER 50     CBC:   Recent Labs   Lab 11/03/21  0553 11/02/21  0527 11/01/21  0808   WBC 30.0* 25.6* 32.6*   HGB 7.8* 7.8* 8.2*   HCT 26.7* 27.4* 28.5*   * 117* 117*   * 573* 672*     Chemistry:   Recent Labs   Lab 11/03/21  0553 11/02/21  1641 11/02/21  0527 11/01/21  0808 10/30/21  0838 10/29/21  2310     --  144 143   < > 141   POTASSIUM 5.2*  --  5.2* 4.9   < > 4.9   CHLORIDE 107  --  109* 109*   < > 103   CO2 25  --  27 27   < > 29   BUN 34*  --  28 28   < > 36*   CR 1.60*  --  1.51* 1.28   < > 1.61*   GFRESTIMATED 37*  --  39* 48*   < > 36*   CIRA 9.5  --  8.9 9.2   < > 10.0   PROTTOTAL  --  5.9* 5.4* 5.7*  --  6.4   ALBUMIN  --   --  3.2* 3.4*  --  3.9   AST  --   --  21 21  --  19   ALT  --   --  <9 <9  --  <9   ALKPHOS  --   --  142* 146*  --  122*   BILITOTAL  --   --  0.5 0.5  --  0.6    < > = values in this interval not displayed.     Coags:  No results for input(s): INR, PROTIME, PTT in the last 168 hours.    Invalid input(s): APTT  Cardiac Markers:  Recent Labs   Lab 10/30/21  0838   TROPONINI 0.04        Chest XR,  PA & LAT    Result Date: 10/30/2021  EXAM: XR CHEST 2 VW LOCATION: [facility] DATE/TIME:  10/30/2021 12:01 AM INDICATION: fatigue COMPARISON: 2019     IMPRESSION: Heart size prominent on this AP view but unchanged. Pulmonary vascularity normal. Subsegmental atelectasis or scarring in the bases. Small bilateral pleural effusions posteriorly in the costophrenic angles. Upper lung fields are clear. Pacemaker lead tips right atrium and right ventricle. Clips upper abdomen.    Echocardiogram Complete    Result Date: 10/30/2021  [id number]^SHAYY^KAIDEN^[place] [address]  Name: GEE OGDEN MRN: [id number] : 1928 Study Date: 10/30/2021 10:42 AM Age: 89 yrs Gender: Male Patient Location: [facility] Reason For Study: Chest Discomfort Ordering Physician: KAIDEN LUCAS Performed By: JUANA  BSA: 2.2 m2 Height: 74 in Weight: 203 lb HR: 78 ______________________________________________________________________________ Procedure Definity (NDC #[id number]) given intravenously. ______________________________________________________________________________ Interpretation Summary  Definity contrast utilized The left ventricle is mildly dilated. There is mild to moderate concentric left ventricular hypertrophy. LV systolic function appears lower limits of normal The visual ejection fraction is 50-55%. Septal wall motion abnormality may reflect pacemaker activation. TAPSE is normal, which is consistent with normal right ventricular systolic function. Pacemaker lead in the right heart There is mild (1+) tricuspid regurgitation. Estimate of RV systolic pressure is elevated at 54mmhg plus right atrial pressure Mild to moderate valvular aortic stenosis.Peak velocity 2.6 m/s,mean gradient 14mmhg.2D visualization of the aortic valve suggests the potential for more significant aortic stenosis then calculated by doppler examination There is mild (1+) aortic regurgitation. The aortic root is moderately dilated.Measures approxmately 4.6cm Mild to  moderate enlargement of the proximal ascending aorta measures approximately 4.4cm Consider CT,GABE or MRI (if pacer compatible) to further define the aortic valve and thoracic aorta No prior study available for comparison ______________________________________________________________________________ Left Ventricle The left ventricle is mildly dilated. There is mild to moderate concentric left ventricular hypertrophy. Diastolic Doppler findings (E/E' ratio and/or other parameters) suggest left ventricular filling pressures are normal. The visual ejection fraction is 50-55%. LV systolic function appears lower limits of normal. Septal wall motion abnormality may reflect pacemaker activation.  Right Ventricle The right ventricle is not well visualized. The right ventricle is mildly dilated. TAPSE is normal, which is consistent with normal right ventricular systolic function. The right ventricular systolic function is normal. There is a pacemaker lead in the right ventricle.  Atria The left atrium is mild to moderately dilated. Pacer wire in right atrium. Right atrium not well visualized. The right atrium is mildly dilated.  Mitral Valve The mitral valve leaflets appear thickened, but open well. There is moderate mitral annular calcification. There is mild (1+) mitral regurgitation.  Tricuspid Valve There is mild (1+) tricuspid regurgitation. Moderate (46-55mmHg) pulmonary hypertension is present. The right ventricular systolic pressure is approximated at 53.5 mmHg plus the right atrial pressure.  Aortic Valve Moderate to severe aortic valve leaflet calcification. There is mild (1+) aortic regurgitation. Mild to moderate valvular aortic stenosis.  Pulmonic Valve There is mild (1+) pulmonic valvular regurgitation.  Vessels The aortic root is moderately dilated.Measures approxmately 4.6cm. Mild to moderate enlargement of the proximal ascending aorta measures approximately 4.4cm. IVC diameter <2.1 cm collapsing >50% with  sniff suggests a normal RA pressure of 3 mmHg.  Pericardium Trivial pericardial effusion.  ______________________________________________________________________________ MMode/2D Measurements & Calculations IVSd: 1.4 cm LVIDd: 6.9 cm LVIDs: 4.0 cm LVPWd: 1.4 cm  FS: 42.1 % LV mass(C)d: 479.8 grams LV mass(C)dI: 219.4 grams/m2 Ao root diam: 4.6 cm LA dimension: 4.6 cm asc Aorta Diam: 4.7 cm LA/Ao: 1.0 LVOT diam: 3.0 cm LVOT area: 7.2 cm2 RWT: 0.40  Time Measurements MM HR: 74.0 BPM  Doppler Measurements & Calculations MV E max indio: 67.3 cm/sec MV A max indio: 90.3 cm/sec MV E/A: 0.75 MV dec slope: 326.6 cm/sec2 MV dec time: 0.21 sec Ao V2 max: 232.4 cm/sec Ao max P.0 mmHg Ao V2 mean: 177.2 cm/sec Ao mean P.3 mmHg Ao V2 VTI: 54.3 cm DEEPIKA(I,D): 2.5 cm2 DEEPIKA(V,D): 3.2 cm2 AI P1/2t: 518.4 msec LV V1 max P.2 mmHg LV V1 max: 102.3 cm/sec LV V1 VTI: 19.1 cm  SV(LVOT): 137.7 ml SI(LVOT): 63.0 ml/m2 PA acc time: 0.07 sec PI end-d indio: 132.7 cm/sec TR max indio: 365.9 cm/sec TR max P.5 mmHg AV Indio Ratio (DI): 0.44 DEEPIKA Index (cm2/m2): 1.2 E/E' av.9 Lateral E/e': 6.7 Medial E/e': 11.0  ______________________________________________________________________________ Report approved by: Trev Jason 10/30/2021 12:53 PM       XR Chest Port 1 View    Result Date: 2021  EXAM: XR CHEST PORT 1 VIEW LOCATION: [facility] DATE/TIME: 2021 11:10 AM INDICATION: persistent hypoxia COMPARISON: 10/29/2021     IMPRESSION: Stable position multilead left subclavian pacemaker. Stable heart size. There is cephalization of blood flow, and small pleural effusions, increased from previous. These findings suggest CHF. Also, there is new left mid to inferior chest opacity which could represent asymmetric pulmonary edema, or pneumonia.    CT Abdomen Pelvis w Contrast    Result Date: 10/30/2021  EXAM: CT ABDOMEN PELVIS W CONTRAST LOCATION: [facility] DATE/TIME: 10/30/2021  12:11 AM INDICATION: Leukocytosis and fatigue COMPARISON: 05/18/2021 TECHNIQUE: CT scan of the abdomen and pelvis was performed following injection of IV contrast. Multiplanar reformats were obtained. Dose reduction techniques were used. CONTRAST: isovue 370 75ml FINDINGS: LOWER CHEST: Bibasilar atelectasis or infiltrate and small pleural effusions. Cardiac leads. HEPATOBILIARY: Cholecystectomy. PANCREAS: Fatty atrophy. SPLEEN: Splenomegaly, 18.4 cm. ADRENAL GLANDS: Stable. KIDNEYS/BLADDER: Small renal cysts. BOWEL: Normal caliber. Diverticulosis. Postsurgical change sigmoid colon. Rectal wall thickening not excluded. LYMPH NODES: Normal. VASCULATURE: Atherosclerotic vascular calcification. 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 Procedural changes. Mild periaortic stranding similar. Ectatic iliac vasculature. PELVIC ORGANS: Prostatectomy. Presacral edema. MUSCULOSKELETAL: Fat-containing right inguinal hernia. Degenerative change osseous structures. Tiny helical hernia containing fat and knuckle of nondistended small bowel.     IMPRESSION: 1.  Bibasilar atelectasis or infiltrate and small pleural effusions. 2.  Splenomegaly. 3.  The wall thickening not excluded. Correlate for proctitis. 4.  Diverticulosis. 5.  Remainder similar to previous.    CT Head w/o Contrast    Result Date: 10/31/2021  EXAM: CT HEAD W/O CONTRAST LOCATION: [facility] DATE/TIME: 10/31/2021 1:35 PM INDICATION: Weakness. Mild proliferative disorder. COMPARISON: Head CT 08/12/2019 TECHNIQUE: Routine CT Head without IV contrast. Multiplanar reformats. Dose reduction techniques were used. FINDINGS: INTRACRANIAL CONTENTS: No intracranial hemorrhage, extraaxial collection, or mass effect.  No CT evidence of acute infarct. Moderate presumed chronic small vessel ischemic changes. Moderate generalized volume loss. No hydrocephalus. VISUALIZED ORBITS/SINUSES/MASTOIDS: No  intraorbital abnormality. No paranasal sinus mucosal disease. No middle ear or mastoid effusion. BONES/SOFT TISSUES: No acute abnormality.     IMPRESSION: 1.  No CT evidence for acute intracranial process. 2.  Brain atrophy and presumed chronic microvascular ischemic changes as above. 3.  No change.      Latest radiology report personally reviewed.    Note created using dragon voice recognition software so sounds alike errors may have escaped editing.    11/03/2021   SADE RAMSEY MD  HOSPITALIST, [facility]  PAGER NO. [phone]

## 2021-11-03 NOTE — PLAN OF CARE
Problem: Adult Inpatient Plan of Care  Goal: Plan of Care Review  Outcome: No Change  Goal: Patient-Specific Goal (Individualized)  Outcome: No Change  Goal: Absence of Hospital-Acquired Illness or Injury  Outcome: No Change  Intervention: Identify and Manage Fall Risk  Recent Flowsheet Documentation  Taken 11/3/2021 0100 by Anny Marie RN  Safety Promotion/Fall Prevention: bed alarm on  Intervention: Prevent Infection  Recent Flowsheet Documentation  Taken 11/3/2021 0100 by Anny Marie RN  Infection Prevention:   single patient room provided   rest/sleep promoted  Goal: Optimal Comfort and Wellbeing  Outcome: No Change  Goal: Readiness for Transition of Care  Outcome: No Change   Pts heart rate has been dropping to mid to low 40's at times, goes back up to 60's and 70's. House officer called and will cont to monitor and hold beta blockers at this time. Also bp was 95/51 so iv lasix was held this am due to parameters to hold if less than 110. Will cont to monitor and treat as needed.  Problem: Gas Exchange Impaired  Goal: Optimal Gas Exchange  Outcome: No Change   Pt cont to be on bipap overnight, sats are in mid to upper 90's.   Problem: Oral Intake Inadequate COPD (Chronic Obstructive Pulmonary Disease)  Goal: Improved Nutrition Intake  Outcome: No Change     Problem: Risk for Delirium  Goal: Optimal Coping  Outcome: No Change  Goal: Improved Behavioral Control  Outcome: No Change  Goal: Improved Attention and Thought Clarity  Outcome: No Change  Goal: Improved Sleep  Outcome: No Change   Pt able to sleep some throughout the shift. Awake early this am due to machine beeping and interruptions with cares.   Problem: OT General Care Plan  Goal: Transfer (OT)  Description: Transfer (OT)  Outcome: No Change

## 2021-11-04 NOTE — PROGRESS NOTES
[facility] DIGESTIVE HEALTH PROGRESS NOTE      Subjective:              Breathing essentially at baseline today. Patient still having dysphagia symptoms. Some constipation which is chronic.      Objective:                Body mass index is 26.53 kg/m .  Vital signs in last 24 hrs;  Temp:  [97.3  F (36.3  C)-98.6  F (37  C)] 97.3  F (36.3  C)  Pulse:  [70-89] 77  Resp:  [16-22] 18  BP: (113-134)/(56-63) 113/56  SpO2:  [86 %-100 %] 100 %    Physical Exam:   General: More comfortable appearing. Awake.   Cardiovascular: Regular rate  Chest: Still gets out of breath with much talking.   Abdomen: non-distended  Neurologic: Alert, no focal defects.     Current Labs:  CMP Results: Recent Labs   Lab Test 11/04/21  0559 11/03/21  0553 11/02/21  0527      < > 144   POTASSIUM 4.6   < > 5.2*   CHLORIDE 106   < > 109*   CO2 27   < > 27   ANIONGAP 9   < > 8   *   < > 96   BUN 43*   < > 28   CR 1.85*   < > 1.51*   BILITOTAL  --   --  0.5   ALKPHOS  --   --  142*   ALT  --   --  <9   AST  --   --  21    < > = values in this interval not displayed.      CBC  Recent Labs   Lab 11/04/21  0559 11/03/21  0553 11/02/21  0527 11/01/21  0808   WBC 47.6* 30.0* 25.6* 32.6*   RBC 2.39* 2.33* 2.34* 2.44*   HGB 8.0* 7.8* 7.8* 8.2*   HCT 26.6* 26.7* 27.4* 28.5*   * 115* 117* 117*   MCH 33.5* 33.5* 33.3* 33.6*   MCHC 30.1* 29.2* 28.5* 28.8*   RDW 17.0* 17.2* 17.2* 17.4*   * 548* 573* 672*     INRNo lab results found in last 7 days.   No results found for: LIPASE    Relevant Imaging:  No new abd imaging.     Assessment:       Efe Huertas is a 89 year old male with Jak2 myeloproliferative disorder, COPD on 2 L oxygen at baseline, CHF with defibrillator, JANETH, hx prostate CA admitted with generalized weakness and failure to thrive with 10-15 lb weight loss         # Dysphagia - to liquids and solids. Cause unclear. Motility disorder, stricture, malignancy, diverticulum all possibilities.   - Ongoing issues with eating.      #  Possible rectal wall thickening seen on CT.   - Last colonoscopy 2008.   - Could be proctitis, underdistention, constipation changes, IBD or malignancy?     # Acute and chronic hypoxic respiratory failure   # COPD  # HFpEF  - He seems to be at baseline currently.     Plan:     - I had a long conversation with Ruben and his son at bedside today regarding options moving forward including proceeding with EGD/Flex vs conservative mgmt vs imaging. Discussed risks of proceeding with sedation for procedures and possibility of respiratory compromise. They understand this but also want to see if swallowing can get better with a possible dilatation. They would like to proceed with the procedures.   - Will plan for EGD/Flex sig tomorrow. I will defer airway mgmt to anesthesia.             Leighton Tony, DO  Thank you for the opportunity to participate in the care of this patient.   Please feel free to call me with any questions or concerns.  Phone number [phone].

## 2021-11-04 NOTE — PROGRESS NOTES
Daily Progress Note        CODE STATUS:  No CPR- Pre-arrest intubation OK    11/02/21  Assessment/Plan:  Efe Huertas is a 89 year old old male with past medical history of atrial fibrillation, COPD on 2 L nasal cannula, JANETH on CPAP, CHF, myeloproliferative disorder who presented to ED for evaluation of generalized malaise, fatigue, poor appetite and admitted for further management.    Acute on chronic respiratory failure with hypoxia; likely multifactorial-possible pneumonia, acute CHF with preserved EF, COPD exacerbation, pleural effusion, physical deconditioning.    Patient with history of COPD on 2-3 L nasal cannula at home and JANETH on CPAP  --On admission, CT  A/P reported bilateral atelectasis or infiltrate and small pleural effusions.  --On 11/1, CXR reported findings consistent with CHF  --On admission normal procalcitonin and normal lactic acid.  --On admission, elevated WBC count.   --On 11/1 night required BiPAP for worsening respiratory status.  --S/p right thoracocentesis on 11/2 and left thoracocentesis on 11/3 with removal of 900 cc from each side  --Patient initiated on DuoNeb, titrate.  IV Solu-Medrol changed to oral prednisone on 11/4.   --On Levaquin, continue for total of 5 to 7 days.  Of note normal procalcitonin  --Received IV Lasix 40 mg every 6 hour and also dose of metolazone on 11/3 per cardiology, now creatinine trending up, Lasix discontinued.  --Breathing status improved to almost baseline now.  --Appreciated cardiology and pulmonary input.  Discussed with pulmonologist, reported pleural fluid analysis so far fairly unremarkable and can follow-up with him as an outpatient and signed off.    Dysphagia, unspecified;  Possible rectal wall thickening on CT, possible proctitis;  --Discussed with gastroenterologist today, reported plan for EGD and flex sigmoidoscopy tomorrow.  Discussed risk of respiratory complication post procedure.  --On IV Levaquin and Flagyl,  continue.     Myeloproliferative disorder;  Microcytic anemia;  Leukocytosis;  --Appreciated oncology input, reported MDS versus myelofibrosis, may need bone marrow biopsy to clarify in future.  --On hydroxyurea, continue  --WBC elevated on admission, seems elevated since 5/2021.  However, significantly elevated today, oncology reconsulted.  Of note, urine culture no growth, blood cultures so far no growth, procalcitonin negative and clinically improving    Acute kidney injury; ? Cause.  CHF exacerbation versus overdiuresis  --Home Celebrex on hold  --Admission CT abdomen/pelvis negative for hydronephrosis  --On admission, received IV fluid with improvement on creatinine but concern for CHF and initiated on IV diuretics, creatinine trending up.  Did received a schedule IV Lasix and also dose of metolazone yesterday, refer above but not much urine output.  Will get nephrology input  --Monitor labs, intake/output, weight closely  --Mild hyperkalemia, improved today    H/o Afib;  --has ICD in place  --not anticoagulated due to h/o multiple falls in the past per cardiology note.     Nonsustained ventricular tachycardia on device check per cardiology;  --Appreciated cardiology input, reported no ischemic evaluation at this time, also reported previously weaned off beta-blocker due to lightheadedness and recommended continue to hold at this time.    Severe protein malnutrition per RD;  --Continue supplements.    DVT prophylaxis; subcu heparin      Disposition; anticipate TCU  Barrier to discharge; respiratory failure, consult pending     LOS: 2 days     Subjective:  Interval History: Patient seen and examined. Notes, labs, imaging reports personally reviewed.  Patient reported breathing better, looks more alert and awake today, denied chest pain, cough.  Discussed with the speech therapist, reported seems no issues with thin liquid and puréed diet, also reported he did complain of cream of wheat sticking in his throat and  better after drinking fluid.  Discussed with gastroenterologist, reported planning for EGD and flex sig myeloscopy tomorrow and reported discussed risk and benefit with patient's son.   Discussed with pulmonologist about Covid vaccination, reported seems appropriate around discharge time.    Discussed with nursing staffs.     Review of Systems:   As mentioned in subjective.    Patient Active Problem List   Diagnosis     A-fib (H)     NICM (nonischemic cardiomyopathy) (H)     CHB (complete heart block) (H)     Colovesical fistula     Congestive heart failure (H)     Encounter for servicing of implantable cardioverter-defibrillator (ICD) at end of battery life     Biventricular ICD (implantable cardioverter-defibrillator) in place     Obstructive airway disease (H)     Dizziness     Pre-syncope     Ventricular bigeminy     Constipation     Pneumonia of both lungs due to infectious organism, unspecified part of lung     Chronic respiratory failure (H)     COPD, group B, 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 (H)     Episodic mood disorder (H)     Left foot drop     Mobitz type II atrioventricular block     Leukocytosis, unspecified type     JERED (acute kidney injury) (H)     Proctitis     Myeloproliferative disorder (H)     Splenomegaly     Prostate cancer (H)     Polycythemia     JANETH (obstructive sleep apnea)     Acute on chronic respiratory failure with hypoxia (H)       Scheduled Meds:    heparin ANTICOAGULANT  5,000 Units Subcutaneous Q12H     hydroxyurea  500 mg Oral Daily     ipratropium - albuterol 0.5 mg/2.5 mg/3 mL  3 mL Nebulization 4x daily     lactobacillus rhamnosus (GG)  1 capsule Oral BID     levofloxacin  250 mg Intravenous Q24H     metroNIDAZOLE  500 mg Intravenous Q12H     mirtazapine  3.75 mg Oral At Bedtime     pantoprazole  40 mg Oral QAM AC     polyethylene glycol  17 g Oral Daily     predniSONE  40 mg Oral Daily     senna-docusate  1 tablet Oral Daily     sodium phosphate  1 enema Rectal Once     Continuous  Infusions:    PRN Meds:.acetaminophen, albuterol, alum & mag hydroxide-simethicone, benzonatate, bisacodyl, bisacodyl, magnesium hydroxide, melatonin, ondansetron, senna-docusate, sodium phosphate **FOLLOWED BY** sodium phosphate, sodium phosphate    Objective:  Vital signs in last 24 hours:  Temp:  [97.3  F (36.3  C)-98.6  F (37  C)] 97.3  F (36.3  C)  Pulse:  [70-89] 77  Resp:  [16-22] 18  BP: (113-134)/(56-63) 113/56  SpO2:  [86 %-100 %] 100 %      Intake/Output Summary (Last 24 hours) at 11/2/2021 0810  Last data filed at 11/1/2021 1800  Gross per 24 hour   Intake 140 ml   Output 400 ml   Net -260 ml       Physical Exam:  General: Not in obvious distress.  HEENT: Normocephalic, on BiPAP mask  Chest: Decreased but clear to auscultation bilateral anteriorly  Heart: S1S2 normal, regular  Abdomen: Soft. NT, ND. Bowel sounds- active.  Extremities: No legs swelling  Neuro: More alert and awake, follows simple commands, hard of hearing, moves all extremities    Lab Results:(I have personally reviewed the results)    Recent Results (from the past 24 hour(s))   Renal panel    Collection Time: 11/04/21  5:59 AM   Result Value Ref Range    Sodium 142 136 - 145 mmol/L    Potassium 4.6 3.5 - 5.0 mmol/L    Chloride 106 98 - 107 mmol/L    Carbon Dioxide (CO2) 27 22 - 31 mmol/L    Anion Gap 9 5 - 18 mmol/L    Urea Nitrogen 43 (H) 8 - 28 mg/dL    Creatinine 1.85 (H) 0.70 - 1.30 mg/dL    Calcium 9.8 8.5 - 10.5 mg/dL    Glucose 144 (H) 70 - 125 mg/dL    Albumin 3.2 (L) 3.5 - 5.0 g/dL    Phosphorus 3.3 2.5 - 4.5 mg/dL    GFR Estimate 31 (L) >60 mL/min/1.73m2   CBC with platelets and differential    Collection Time: 11/04/21  5:59 AM   Result Value Ref Range    WBC Count 47.6 (H) 4.0 - 11.0 10e3/uL    RBC Count 2.39 (L) 4.40 - 5.90 10e6/uL    Hemoglobin 8.0 (L) 13.3 - 17.7 g/dL    Hematocrit 26.6 (L) 40.0 - 53.0 %     (H) 78 - 100 fL    MCH 33.5 (H) 26.5 - 33.0 pg    MCHC 30.1 (L) 31.5 - 36.5 g/dL    RDW 17.0 (H) 10.0 - 15.0  %    Platelet Count 549 (H) 150 - 450 10e3/uL   Manual Differential    Collection Time: 11/04/21  5:59 AM   Result Value Ref Range    % Neutrophils 97 %    % Lymphocytes 1 %    % Monocytes 2 %    % Eosinophils 0 %    % Basophils 0 %    Absolute Neutrophils 46.2 (H) 1.6 - 8.3 10e3/uL    Absolute Lymphocytes 0.5 (L) 0.8 - 5.3 10e3/uL    Absolute Monocytes 1.0 0.0 - 1.3 10e3/uL    Absolute Eosinophils 0.0 0.0 - 0.7 10e3/uL    Absolute Basophils 0.0 0.0 - 0.2 10e3/uL    RBC Morphology Confirmed RBC Indices     Platelet Assessment  Automated Count Confirmed. Platelet morphology is normal.     Automated Count Confirmed. Platelet morphology is normal.    Stomatocytes Slight (A) None Seen         Serum Glucose range:   Recent Labs   Lab 11/04/21  0559 11/03/21  0553 11/02/21  0527 11/01/21  0808   * 152* 96 94     ABG:   Recent Labs   Lab 11/01/21  2308   PH 7.28*   PCO2 55*   PO2 58*   HCO3 23   O2PER 50     CBC:   Recent Labs   Lab 11/04/21  0559 11/03/21  0553 11/02/21  0527   WBC 47.6* 30.0* 25.6*   HGB 8.0* 7.8* 7.8*   HCT 26.6* 26.7* 27.4*   * 115* 117*   * 548* 573*   NEUTROPHIL 97  --   --    LYMPH 1  --   --    MONOCYTE 2  --   --    EOSINOPHIL 0  --   --      Chemistry:   Recent Labs   Lab 11/04/21  0559 11/03/21  0553 11/02/21  1641 11/02/21  0527 11/01/21  0808 11/01/21  0808 10/30/21  0838 10/29/21  2310    142  --  144   < > 143   < > 141   POTASSIUM 4.6 5.2*  --  5.2*   < > 4.9   < > 4.9   CHLORIDE 106 107  --  109*   < > 109*   < > 103   CO2 27 25  --  27   < > 27   < > 29   BUN 43* 34*  --  28   < > 28   < > 36*   CR 1.85* 1.60*  --  1.51*   < > 1.28   < > 1.61*   GFRESTIMATED 31* 37*  --  39*   < > 48*   < > 36*   CIRA 9.8 9.5  --  8.9   < > 9.2   < > 10.0   PROTTOTAL  --   --  5.9* 5.4*  --  5.7*  --  6.4   ALBUMIN 3.2*  --   --  3.2*  --  3.4*  --  3.9   AST  --   --   --  21  --  21  --  19   ALT  --   --   --  <9  --  <9  --  <9   ALKPHOS  --   --   --  142*  --  146*  --   122*   BILITOTAL  --   --   --  0.5  --  0.5  --  0.6    < > = values in this interval not displayed.     Coags:  No results for input(s): INR, PROTIME, PTT in the last 168 hours.    Invalid input(s): APTT  Cardiac Markers:  Recent Labs   Lab 10/30/21  0838   TROPONINI 0.04        Chest XR,  PA & LAT    Result Date: 10/30/2021  EXAM: XR CHEST 2 VW LOCATION: [facility] DATE/TIME: 10/30/2021 12:01 AM INDICATION: fatigue COMPARISON: 2019     IMPRESSION: Heart size prominent on this AP view but unchanged. Pulmonary vascularity normal. Subsegmental atelectasis or scarring in the bases. Small bilateral pleural effusions posteriorly in the costophrenic angles. Upper lung fields are clear. Pacemaker lead tips right atrium and right ventricle. Clips upper abdomen.    Echocardiogram Complete    Result Date: 10/30/2021  [id number]^[email or web address]^KAIDEN^[place] [address]  Name: GEE OGDEN MRN: [id number] : 1928 Study Date: 10/30/2021 10:42 AM Age: 89 yrs Gender: Male Patient Location: [facility] Reason For Study: Chest Discomfort Ordering Physician: KAIDEN LUCAS Performed By: JUANA  BSA: 2.2 m2 Height: 74 in Weight: 203 lb HR: 78 ______________________________________________________________________________ Procedure Definity (NDC #57331-945) given intravenously. ______________________________________________________________________________ Interpretation Summary  Definity contrast utilized The left ventricle is mildly dilated. There is mild to moderate concentric left ventricular hypertrophy. LV systolic function appears lower limits of normal The visual ejection fraction is 50-55%. Septal wall motion abnormality may reflect pacemaker activation. TAPSE is normal, which is consistent with normal right ventricular systolic function. Pacemaker lead in the right heart There is mild (1+) tricuspid regurgitation. Estimate of RV  systolic pressure is elevated at 54mmhg plus right atrial pressure Mild to moderate valvular aortic stenosis.Peak velocity 2.6 m/s,mean gradient 14mmhg.2D visualization of the aortic valve suggests the potential for more significant aortic stenosis then calculated by doppler examination There is mild (1+) aortic regurgitation. The aortic root is moderately dilated.Measures approxmately 4.6cm Mild to moderate enlargement of the proximal ascending aorta measures approximately 4.4cm Consider CT,GABE or MRI (if pacer compatible) to further define the aortic valve and thoracic aorta No prior study available for comparison ______________________________________________________________________________ Left Ventricle The left ventricle is mildly dilated. There is mild to moderate concentric left ventricular hypertrophy. Diastolic Doppler findings (E/E' ratio and/or other parameters) suggest left ventricular filling pressures are normal. The visual ejection fraction is 50-55%. LV systolic function appears lower limits of normal. Septal wall motion abnormality may reflect pacemaker activation.  Right Ventricle The right ventricle is not well visualized. The right ventricle is mildly dilated. TAPSE is normal, which is consistent with normal right ventricular systolic function. The right ventricular systolic function is normal. There is a pacemaker lead in the right ventricle.  Atria The left atrium is mild to moderately dilated. Pacer wire in right atrium. Right atrium not well visualized. The right atrium is mildly dilated.  Mitral Valve The mitral valve leaflets appear thickened, but open well. There is moderate mitral annular calcification. There is mild (1+) mitral regurgitation.  Tricuspid Valve There is mild (1+) tricuspid regurgitation. Moderate (46-55mmHg) pulmonary hypertension is present. The right ventricular systolic pressure is approximated at 53.5 mmHg plus the right atrial pressure.  Aortic Valve Moderate to  severe aortic valve leaflet calcification. There is mild (1+) aortic regurgitation. Mild to moderate valvular aortic stenosis.  Pulmonic Valve There is mild (1+) pulmonic valvular regurgitation.  Vessels The aortic root is moderately dilated.Measures approxmately 4.6cm. Mild to moderate enlargement of the proximal ascending aorta measures approximately 4.4cm. IVC diameter <2.1 cm collapsing >50% with sniff suggests a normal RA pressure of 3 mmHg.  Pericardium Trivial pericardial effusion.  ______________________________________________________________________________ MMode/2D Measurements & Calculations IVSd: 1.4 cm LVIDd: 6.9 cm LVIDs: 4.0 cm LVPWd: 1.4 cm  FS: 42.1 % LV mass(C)d: 479.8 grams LV mass(C)dI: 219.4 grams/m2 Ao root diam: 4.6 cm LA dimension: 4.6 cm asc Aorta Diam: 4.7 cm LA/Ao: 1.0 LVOT diam: 3.0 cm LVOT area: 7.2 cm2 RWT: 0.40  Time Measurements MM HR: 74.0 BPM  Doppler Measurements & Calculations MV E max indio: 67.3 cm/sec MV A max indio: 90.3 cm/sec MV E/A: 0.75 MV dec slope: 326.6 cm/sec2 MV dec time: 0.21 sec Ao V2 max: 232.4 cm/sec Ao max P.0 mmHg Ao V2 mean: 177.2 cm/sec Ao mean P.3 mmHg Ao V2 VTI: 54.3 cm DEEPIKA(I,D): 2.5 cm2 DEEPIKA(V,D): 3.2 cm2 AI P1/2t: 518.4 msec LV V1 max P.2 mmHg LV V1 max: 102.3 cm/sec LV V1 VTI: 19.1 cm  SV(LVOT): 137.7 ml SI(LVOT): 63.0 ml/m2 PA acc time: 0.07 sec PI end-d indio: 132.7 cm/sec TR max indio: 365.9 cm/sec TR max P.5 mmHg AV Indio Ratio (DI): 0.44 DEEPIKA Index (cm2/m2): 1.2 E/E' av.9 Lateral E/e': 6.7 Medial E/e': 11.0  ______________________________________________________________________________ Report approved by: Trev Jason 10/30/2021 12:53 PM       XR Chest Port 1 View    Result Date: 2021  EXAM: XR CHEST PORT 1 VIEW LOCATION: [facility] DATE/TIME: 2021 11:10 AM INDICATION: persistent hypoxia COMPARISON: 10/29/2021     IMPRESSION: Stable position multilead left subclavian pacemaker. Stable heart size.  There is cephalization of blood flow, and small pleural effusions, increased from previous. These findings suggest CHF. Also, there is new left mid to inferior chest opacity which could represent asymmetric pulmonary edema, or pneumonia.    CT Abdomen Pelvis w Contrast  Result Date: 10/30/2021  IMPRESSION: 1.  Bibasilar atelectasis or infiltrate and small pleural effusions. 2.  Splenomegaly. 3.  The wall thickening not excluded. Correlate for proctitis. 4.  Diverticulosis. 5.  Remainder similar to previous.    CT Head w/o Contrast  Result Date: 10/31/2021  IMPRESSION: 1.  No CT evidence for acute intracranial process. 2.  Brain atrophy and presumed chronic microvascular ischemic changes as above. 3.  No change.      Latest radiology report personally reviewed.    Note created using dragon voice recognition software so sounds alike errors may have escaped editing.    11/04/2021   SADE RAMSEY MD  HOSPITALIST, [facility]  [facility] [phone]

## 2021-11-04 NOTE — PLAN OF CARE
Problem: Gas Exchange Impaired  Goal: Optimal Gas Exchange  Outcome: Improving   Pt. Is on CPAP mask (Autotitrate) hospital machine with 3 L oxygen bled in. Continue current orders and monitoring.

## 2021-11-04 NOTE — PLAN OF CARE
Problem: Adult Inpatient Plan of Care  Goal: Plan of Care Review  Outcome: Improving  Goal: Patient-Specific Goal (Individualized)  Outcome: Improving  Goal: Absence of Hospital-Acquired Illness or Injury  Outcome: Improving  Intervention: Identify and Manage Fall Risk  Recent Flowsheet Documentation  Taken 11/4/2021 0150 by Aubrey Montano RN  Safety Promotion/Fall Prevention: bed alarm on  Intervention: Prevent Infection  Recent Flowsheet Documentation  Taken 11/4/2021 0150 by Aubrey Montano RN  Infection Prevention:   hand hygiene promoted   rest/sleep promoted  Goal: Optimal Comfort and Wellbeing  Outcome: Improving  Goal: Readiness for Transition of Care  Outcome: Improving     Problem: Gas Exchange Impaired  Goal: Optimal Gas Exchange  Outcome: Improving     Problem: Oral Intake Inadequate COPD (Chronic Obstructive Pulmonary Disease)  Goal: Improved Nutrition Intake  Outcome: Improving     Problem: Risk for Delirium  Goal: Optimal Coping  Outcome: Improving  Goal: Improved Behavioral Control  Outcome: Improving  Goal: Improved Attention and Thought Clarity  Outcome: Improving  Goal: Improved Sleep  Outcome: Improving     Problem: OT General Care Plan  Goal: Transfer (OT)  Description: Transfer (OT)  Outcome: Improving     Problem: Adjustment to Illness COPD (Chronic Obstructive Pulmonary Disease)  Goal: Optimal Chronic Illness Coping  Outcome: Improving     Problem: Functional Ability Impaired COPD (Chronic Obstructive Pulmonary Disease)  Goal: Optimal Level of Functional Elbert  Outcome: Improving     Problem: Infection COPD (Chronic Obstructive Pulmonary Disease)  Goal: Absence of Infection Signs and Symptoms  Outcome: Improving     Problem: Respiratory Compromise COPD (Chronic Obstructive Pulmonary Disease)  Goal: Effective Oxygenation and Ventilation  Outcome: Improving  Intervention: Promote Airway Secretion Clearance  Recent Flowsheet Documentation  Taken 11/4/2021 0150 by Aubrey Montano  RN  Cough And Deep Breathing: done independently per patient   Patient remained on 3L into CPAP overnite with sats between 90 to 95% until about 0430 whewriter observed desating to 85-88%. Increased O2 to 5L per RT recommendations.

## 2021-11-04 NOTE — PROGRESS NOTES
Pulmonary Progress Note  11/3/2021      Admit Date: 10/29/2021  CODE: No CPR- Pre-arrest intubation OK    Reason for Consult: acute on chronic respiratory failure with hypoxemia. CHF. Pleural effusions. COPD exacerbation.    Assessment/Plan:   89 year old man with history of COPD/emphysema, followed by Dr. Frank ([facility]), chronic hypoxemic respiratory failure on 2Lpm home O2, CAD, chronic leukocytosis 2/2 myeloproliferative disorder? HFpEF, presented on 10/30 with failure to thrive, weakness. Pulmonary consulted for worsening respiratory failure with hypoxemia and hypercapnia, requiring NIPPV and up to 8Lpm via oxymask.  Imaging reviewed in detail. Has bilateral pleural effusions and evidence of possible consolidated lung on bedside ultrasound. Likely has both HFpEF and COPD exacerbations.  Bedside thoracentesis performed with improvement in symptoms and oxygen requirement. Total of 1.8L serosanguinous fluid removed so far, 900cc from each side.   The right pleural fluid is c/w transudative process with low pleural LDH and protein to serum LDH protein ratios, likely due to volume overload from CHF.     He's had marked improvement over the last few days with diuretics (though not making much urine), thoracentesis and steroids. He appears to be at his respiratory baseline.        Plan:  - continue home O2 2Lpm, SpO2 goal 88-92%, avoid hyperoxia.  - duonebs qid, albuterol nebs prn  - stop IV steroids. Give po prednisone 40mg for 2 more days.  - defer diuretics to cardiology service.   - follow up RIGHT pleural fluid cytology, cultures  - agree with levaquin for CAP/bronchitis. Would give 5-7 days total.  - encourage OOB, PT/OT, push IS  - consider addressing code status at some point.   - EGD is going to have some risk of complications to him, no matter what in him, given heart and lung issues, age and frailty. Defer to GI whether EGD is indicated this admission.  - Since Dr. Frank has retired, he would like to see me in  "clinic for pulmonary follow up. Our office will call him to set up an appointment.     No further recommendations and no contraindications to discharge to TCU from pulmonary perspective.   Will sign off. Please call with additional questions.     Kai (Oliverio) MD [facility]  Pager [phone]  Clinic [phone]  Fax [phone]     Subjective/Interim Events:   Doing well on baseline 2Lpm  Up in chair working with PT    He has no complaints.  Not really making much urine with Lasix, metolazone.       Medications:       heparin ANTICOAGULANT  5,000 Units Subcutaneous Q12H     hydroxyurea  500 mg Oral Daily     ipratropium - albuterol 0.5 mg/2.5 mg/3 mL  3 mL Nebulization 4x daily     lactobacillus rhamnosus (GG)  1 capsule Oral BID     levofloxacin  250 mg Intravenous Q24H     metroNIDAZOLE  500 mg Intravenous Q12H     mirtazapine  3.75 mg Oral At Bedtime     pantoprazole  40 mg Oral QAM AC     polyethylene glycol  17 g Oral Daily     predniSONE  40 mg Oral Daily     senna-docusate  1 tablet Oral Daily     sodium phosphate  1 enema Rectal Once         Exam/Data:   Vitals  /56 (BP Location: Left arm)   Pulse 77   Temp 97.3  F (36.3  C) (Axillary)   Resp 18   Ht 1.88 m (6' 2\")   Wt 93.7 kg (206 lb 9.6 oz)   SpO2 100%   BMI 26.53 kg/m       I/O last 3 completed shifts:  In: 435 [P.O.:320; I.V.:115]  Out: 600 [Urine:600]  Weight change:   [unfilled]  FiO2 (%): (S) 40 %  Resp: 18      EXAM:  Physical Exam  Gen: awake, alert, oriented, no distress  HEENT: NT, no ARMIDA  CV: RRR, no m/g/r  Resp: diminished at bases. No wheezing or rhonchi.   Abd: soft, nontender, BS+  Skin: no rashes or lesions  Ext: no edema  Neuro: PERRL, nonfocal exam    ROS:  A 10-system review was obtained and is negative with the exception of the symptoms noted above.    DATA:    Micro  PCT neg on 10/30  RVP neg for resp viruses  Covid, influenza A/B neg    Pleural fluid 11/2  Wbc " 907, 8% lymph, 80% mono/mac, 12% PMN  Glucose 110    Ph 8.0  Protein 2.1  Cytology pending  Cultures pending, gram stain neg    Pleural fluid LDH/serum LDH 0.33  Pleural fluid protein/ serum total protein 0.36     IMAGING:   Chest XR,  PA & LAT    Result Date: 10/30/2021  EXAM: XR CHEST 2 VW LOCATION: [facility] DATE/TIME: 10/30/2021 12:01 AM INDICATION: fatigue COMPARISON: 2019     IMPRESSION: Heart size prominent on this AP view but unchanged. Pulmonary vascularity normal. Subsegmental atelectasis or scarring in the bases. Small bilateral pleural effusions posteriorly in the costophrenic angles. Upper lung fields are clear. Pacemaker lead tips right atrium and right ventricle. Clips upper abdomen.    Echocardiogram Complete    Result Date: 10/30/2021  [id number]^SHAYY^KAIDEN^[place] [address]  Name: GEE OGDEN MRN: [id number] : 1928 Study Date: 10/30/2021 10:42 AM Age: 89 yrs Gender: Male Patient Location: [facility], [place] Reason For Study: Chest Discomfort Ordering Physician: KAIDEN LUCAS Performed By: JUANA  BSA: 2.2 m2 Height: 74 in Weight: 203 lb HR: 78 ______________________________________________________________________________ Procedure Definity (NDC #23183-319) given intravenously. ______________________________________________________________________________ Interpretation Summary  Definity contrast utilized The left ventricle is mildly dilated. There is mild to moderate concentric left ventricular hypertrophy. LV systolic function appears lower limits of normal The visual ejection fraction is 50-55%. Septal wall motion abnormality may reflect pacemaker activation. TAPSE is normal, which is consistent with normal right ventricular systolic function. Pacemaker lead in the right heart There is mild (1+) tricuspid regurgitation. Estimate of RV systolic pressure is elevated at 54mmhg plus right  atrial pressure Mild to moderate valvular aortic stenosis.Peak velocity 2.6 m/s,mean gradient 14mmhg.2D visualization of the aortic valve suggests the potential for more significant aortic stenosis then calculated by doppler examination There is mild (1+) aortic regurgitation. The aortic root is moderately dilated.Measures approxmately 4.6cm Mild to moderate enlargement of the proximal ascending aorta measures approximately 4.4cm Consider CT,GABE or MRI (if pacer compatible) to further define the aortic valve and thoracic aorta No prior study available for comparison ______________________________________________________________________________ Left Ventricle The left ventricle is mildly dilated. There is mild to moderate concentric left ventricular hypertrophy. Diastolic Doppler findings (E/E' ratio and/or other parameters) suggest left ventricular filling pressures are normal. The visual ejection fraction is 50-55%. LV systolic function appears lower limits of normal. Septal wall motion abnormality may reflect pacemaker activation.  Right Ventricle The right ventricle is not well visualized. The right ventricle is mildly dilated. TAPSE is normal, which is consistent with normal right ventricular systolic function. The right ventricular systolic function is normal. There is a pacemaker lead in the right ventricle.  Atria The left atrium is mild to moderately dilated. Pacer wire in right atrium. Right atrium not well visualized. The right atrium is mildly dilated.  Mitral Valve The mitral valve leaflets appear thickened, but open well. There is moderate mitral annular calcification. There is mild (1+) mitral regurgitation.  Tricuspid Valve There is mild (1+) tricuspid regurgitation. Moderate (46-55mmHg) pulmonary hypertension is present. The right ventricular systolic pressure is approximated at 53.5 mmHg plus the right atrial pressure.  Aortic Valve Moderate to severe aortic valve leaflet calcification. There is  mild (1+) aortic regurgitation. Mild to moderate valvular aortic stenosis.  Pulmonic Valve There is mild (1+) pulmonic valvular regurgitation.  Vessels The aortic root is moderately dilated.Measures approxmately 4.6cm. Mild to moderate enlargement of the proximal ascending aorta measures approximately 4.4cm. IVC diameter <2.1 cm collapsing >50% with sniff suggests a normal RA pressure of 3 mmHg.  Pericardium Trivial pericardial effusion.  ______________________________________________________________________________ MMode/2D Measurements & Calculations IVSd: 1.4 cm LVIDd: 6.9 cm LVIDs: 4.0 cm LVPWd: 1.4 cm  FS: 42.1 % LV mass(C)d: 479.8 grams LV mass(C)dI: 219.4 grams/m2 Ao root diam: 4.6 cm LA dimension: 4.6 cm asc Aorta Diam: 4.7 cm LA/Ao: 1.0 LVOT diam: 3.0 cm LVOT area: 7.2 cm2 RWT: 0.40  Time Measurements MM HR: 74.0 BPM  Doppler Measurements & Calculations MV E max indio: 67.3 cm/sec MV A max indio: 90.3 cm/sec MV E/A: 0.75 MV dec slope: 326.6 cm/sec2 MV dec time: 0.21 sec Ao V2 max: 232.4 cm/sec Ao max P.0 mmHg Ao V2 mean: 177.2 cm/sec Ao mean P.3 mmHg Ao V2 VTI: 54.3 cm DEEPIKA(I,D): 2.5 cm2 DEEPIKA(V,D): 3.2 cm2 AI P1/2t: 518.4 msec LV V1 max P.2 mmHg LV V1 max: 102.3 cm/sec LV V1 VTI: 19.1 cm  SV(LVOT): 137.7 ml SI(LVOT): 63.0 ml/m2 PA acc time: 0.07 sec PI end-d indio: 132.7 cm/sec TR max indio: 365.9 cm/sec TR max P.5 mmHg AV Indio Ratio (DI): 0.44 DEEPIKA Index (cm2/m2): 1.2 E/E' av.9 Lateral E/e': 6.7 Medial E/e': 11.0  ______________________________________________________________________________ Report approved by: Trev Jason 10/30/2021 12:53 PM       CT Abdomen Pelvis w Contrast    Result Date: 10/30/2021  EXAM: CT ABDOMEN PELVIS W CONTRAST LOCATION: [facility] DATE/TIME: 10/30/2021 12:11 AM INDICATION: Leukocytosis and fatigue COMPARISON: 2021 TECHNIQUE: CT scan of the abdomen and pelvis was performed following injection of IV contrast. Multiplanar reformats  were obtained. Dose reduction techniques were used. CONTRAST: isovue 370 75ml FINDINGS: LOWER CHEST: Bibasilar atelectasis or infiltrate and small pleural effusions. Cardiac leads. HEPATOBILIARY: Cholecystectomy. PANCREAS: Fatty atrophy. SPLEEN: Splenomegaly, 18.4 cm. ADRENAL GLANDS: Stable. KIDNEYS/BLADDER: Small renal cysts. BOWEL: Normal caliber. Diverticulosis. Postsurgical change sigmoid colon. Rectal wall thickening not excluded. LYMPH NODES: Normal. VASCULATURE: Atherosclerotic vascular calcification. 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 Procedural changes. Mild periaortic stranding similar. Ectatic iliac vasculature. PELVIC ORGANS: Prostatectomy. Presacral edema. MUSCULOSKELETAL: Fat-containing right inguinal hernia. Degenerative change osseous structures. Tiny helical hernia containing fat and knuckle of nondistended small bowel.     IMPRESSION: 1.  Bibasilar atelectasis or infiltrate and small pleural effusions. 2.  Splenomegaly. 3.  The wall thickening not excluded. Correlate for proctitis. 4.  Diverticulosis. 5.  Remainder similar to previous.

## 2021-11-04 NOTE — PLAN OF CARE
Problem: Adult Inpatient Plan of Care  Goal: Plan of Care Review  Outcome: Improving  Goal: Patient-Specific Goal (Individualized)  Outcome: Improving  Goal: Absence of Hospital-Acquired Illness or Injury  Outcome: Improving  Intervention: Identify and Manage Fall Risk  Recent Flowsheet Documentation  Taken 11/3/2021 1553 by Betty Encarnacion RN  Safety Promotion/Fall Prevention: bed alarm on  Taken 11/3/2021 0735 by Betty Encarnacion RN  Safety Promotion/Fall Prevention: bed alarm on  Intervention: Prevent Skin Injury  Recent Flowsheet Documentation  Taken 11/3/2021 1553 by Betty Encarnacion RN  Body Position: turned  Taken 11/3/2021 0735 by Betty Encarnacion RN  Body Position: turned  Intervention: Prevent Infection  Recent Flowsheet Documentation  Taken 11/3/2021 1553 by Betty Encarnacion RN  Infection Prevention: single patient room provided  Taken 11/3/2021 0735 by Betty Encarnacion RN  Infection Prevention: single patient room provided  Goal: Optimal Comfort and Wellbeing  Outcome: Improving  Goal: Readiness for Transition of Care  Outcome: Improving     Problem: Gas Exchange Impaired  Goal: Optimal Gas Exchange  Outcome: Improving  Intervention: Optimize Oxygenation and Ventilation  Recent Flowsheet Documentation  Taken 11/3/2021 1553 by Betty Encarnacion RN  Head of Bed (HOB) Positioning: HOB at 20 degrees  Taken 11/3/2021 0735 by Betty Encarnacion RN  Head of Bed (HOB) Positioning: HOB at 20 degrees     Problem: Oral Intake Inadequate COPD (Chronic Obstructive Pulmonary Disease)  Goal: Improved Nutrition Intake  Outcome: Improving     Problem: Risk for Delirium  Goal: Optimal Coping  Outcome: Improving  Goal: Improved Behavioral Control  Outcome: Improving  Intervention: Prevent and Manage Agitation  Recent Flowsheet Documentation  Taken 11/3/2021 1553 by Betty Encarnacion RN  Environment Familiarity/Consistency: familiar objects from home provided  Taken 11/3/2021 0735 by Betty Encarnacion RN  Environment  Familiarity/Consistency: familiar objects from home provided  Goal: Improved Attention and Thought Clarity  Outcome: Improving  Intervention: Maximize Cognitive Function  Recent Flowsheet Documentation  Taken 11/3/2021 1553 by Betty Encarnacion RN  Reorientation Measures: reorientation provided  Taken 11/3/2021 0735 by Betty Encarnacion RN  Reorientation Measures: reorientation provided  Goal: Improved Sleep  Outcome: Improving     Problem: OT General Care Plan  Goal: Transfer (OT)  Description: Transfer (OT)  Outcome: Improving     Problem: Adjustment to Illness COPD (Chronic Obstructive Pulmonary Disease)  Goal: Optimal Chronic Illness Coping  Outcome: Improving     Problem: Functional Ability Impaired COPD (Chronic Obstructive Pulmonary Disease)  Goal: Optimal Level of Functional Gateway  Outcome: Improving  Intervention: Optimize Functional Ability  Recent Flowsheet Documentation  Taken 11/3/2021 1553 by Betty Encarnacion RN  Activity Management: bedrest  Environmental Support: calm environment promoted  Taken 11/3/2021 0735 by Betty Encarnacion RN  Activity Management: bedrest  Environmental Support: calm environment promoted     Problem: Infection COPD (Chronic Obstructive Pulmonary Disease)  Goal: Absence of Infection Signs and Symptoms  Outcome: Improving     Problem: Respiratory Compromise COPD (Chronic Obstructive Pulmonary Disease)  Goal: Effective Oxygenation and Ventilation  Outcome: Improving  Intervention: Promote Airway Secretion Clearance  Recent Flowsheet Documentation  Taken 11/3/2021 1553 by Betty Encarnacion RN  Cough And Deep Breathing: done independently per patient  Activity Management: bedrest  Taken 11/3/2021 0735 by Betty Encarnacion RN  Cough And Deep Breathing: done independently per patient  Activity Management: bedrest  Intervention: Optimize Oxygenation and Ventilation  Recent Flowsheet Documentation  Taken 11/3/2021 1553 by Betty Encarnacion, RN  Head of Bed (HOB) Positioning: HOB at 20  degrees  Taken 11/3/2021 0735 by Betty Encarnacion, RN  Head of Bed (HOB) Positioning: HOB at 20 degrees   Pt tolerated procedure, daughter present for fluid removal with Dr. Christina.  Pt reports feeling much better, did have a couple bites of food for lunch, reports poor appetite.  Pt's azul patent and draining.  Pt has been weaned off of bipap, now on NC, 2LPM of O2.  Cardiologist has increased lasix.  Will continue to monitor and update as needed.

## 2021-11-04 NOTE — PROGRESS NOTES
Duo nebs given as scheduled, sats 97% on 2L.  Breath sounds coarse at times and diminished. Pt using flutter valve when directed to.  Strong dry cough.

## 2021-11-04 NOTE — PROGRESS NOTES
Impression and Plan     Impression:   1. Acute on chronic respiratory failure. Likely multifactorial, with underlying chronic obstructive pulmonary disease and suspected pneumonia certainly contributing. Status post right-sided thoracentesis on 11/2/2021 and left-sided thoracentesis on 11/3/2021. May be near euvolemic, with worsening renal function with diuresis.  2. Acute on chronic congestive heart failure with preserved left ventricular ejection fraction, mostly recently noted to be 50%.   3. Nonsustained ventricular tachycardia. This has been seen on prior device checks. He is not having any obvious symptoms or hemodynamic instability from this. He was noted to have normal coronary arteries many years ago.  4. Paroxysmal atrial fibrillation. [id number] score is at least 3.  5. Suggestion of aortic stenosis on echocardiogram. His physical examination is not consistent with significant aortic stenosis.  6. Dilated ascending thoracic aorta, stable compared to prior echocardiograms.  7. High-grade atrioventricular block and underlying left bundle branch block status post [employer] biventricular pacemaker and defibrillator placement on 2/8/2013. Normal device function.  8. Obstructive sleep apnea with history of CPAP noncompliance.  9. JAK2-positive myeloproliferative disorder.    Plan:    Hold on additional diuresis for now    Can remove King catheter unless there is another medical reason for it outside of accurate urine output monitoring     Given his age and co-morbidities, lack of clear symptoms from his ventricular tachycardia or anginal symptoms, and the fact that this has been seen on prior device interrogations, I would not consider an ischemic evaluation at this time    He previously was weaned off beta blocker therapy due to lightheadedness. As he is not clearly symptomatic from his ventricular tachycardia, we can continue to hold this.    He was previously taken off oral anticoagulation  "due to concern for falls.    We will sign off at this time. Please do not hesitate to contact us with additional questions or concerns    Primary Cardiologist: Dr. Prashanth Bull with [facility]    Subjective     - s/p left thoracentesis yesterday  - breathing better today    Cardiac Diagnostics     ECG 10/29/2021: Personally reviewed and interpreted: A-sensed, BiV paced     Device interrogation 10/30/2021:  2 years battery life remaining  Several episodes of NSVT, occasional episodes of pacemaker-mediated tachycardia since last device check Dec 2020.  No sustained arrhythmias requiring therapy.  98% Bi-V paced     Most recent:  Echocardiogram 10/31/2021 (results reviewed):   The left ventricle is mildly dilated.  There is mild to moderate concentric left ventricular hypertrophy.  LV systolic function appears lower limits of normal  The visual ejection fraction is 50-55%.  Septal wall motion abnormality may reflect pacemaker activation.  TAPSE is normal, which is consistent with normal right ventricular systolic  function.  Pacemaker lead in the right heart  There is mild (1+) tricuspid regurgitation.  Estimate of RV systolic pressure is elevated at 54mmhg plus right atrial  pressure  Mild to moderate valvular aortic stenosis. Peak velocity 2.6 m/s,mean gradient  14mmhg. 2D visualization of the aortic valve suggests the potential for more  significant aortic stenosis then calculated by doppler examination  There is mild (1+) aortic regurgitation.  The aortic root is moderately dilated.Measures approxmately 4.6cm  Mild to moderate enlargement of the proximal ascending aorta measures  approximately 4.4cm    Physical Examination       /56 (BP Location: Left arm)   Pulse 77   Temp 97.3  F (36.3  C) (Axillary)   Resp 18   Ht 1.88 m (6' 2\")   Wt 93.7 kg (206 lb 9.6 oz)   SpO2 100%   BMI 26.53 kg/m          Wt Readings from Last 3 Encounters:   11/04/21 93.7 kg (206 lb 9.6 oz) "   09/09/19 101.3 kg (223 lb 6.4 oz)   10/11/19 102.8 kg (226 lb 9.6 oz)               Intake/Output Summary (Last 24 hours) at 11/4/2021 1458  Last data filed at 11/4/2021 1347  Gross per 24 hour   Intake 735 ml   Output 1000 ml   Net -265 ml       General: pleasant male. No acute distress.   HENT: external ears normal. Nares patent. Mucous membranes moist.  Eyes: perrla, extraocular muscles intact. No scleral icterus.   Neck: JVP does not appear significantly elevated.  Lungs: diminished breath sounds at lung bases  COR:  regular rate and rhythm, No murmurs, rubs, or gallops  Abd: nondistended, BS present  Extrem: No edema         Imaging      CXR 11/3/2021 (report reviewed):  EXAM: XR CHEST PORT 1 VIEW  LOCATION: [facility]  DATE/TIME: 11/3/2021 12:28 PM     INDICATION: s/p left thoracentesis. eval for interval change, rule out pneumo  COMPARISON: 11/02/2021.                                                                      IMPRESSION: There has been interval decrease in left pleural effusion with improved aeration of the retrocardiac left lower lobe. No pneumothorax. Heart size and pulmonary vascularity are unchanged. Mild pulmonary vascular congestion has improved.   Pacemaker defibrillator with leads over the right atrium, right ventricle and coronary sinus.    Lab Results   Lab Results   Component Value Date     11/04/2021    CO2 27 11/04/2021    BUN 43 11/04/2021     Lab Results   Component Value Date    WBC 47.6 11/04/2021    HGB 8.0 11/04/2021    HCT 26.6 11/04/2021     11/04/2021     11/04/2021     Lab Results   Component Value Date    INR 1.25 08/12/2019     Lab Results   Component Value Date     08/12/2019     Lab Results   Component Value Date    TROPONINI 0.04 10/30/2021    TROPONINI 0.05 10/29/2021    TROPONINI <0.01 08/12/2019     Lab Results   Component Value Date    TSH 0.73 05/19/2021           Current Inpatient Scheduled Medications    Scheduled Meds:    heparin ANTICOAGULANT  5,000 Units Subcutaneous Q12H     hydroxyurea  500 mg Oral Daily     ipratropium - albuterol 0.5 mg/2.5 mg/3 mL  3 mL Nebulization 4x daily     lactobacillus rhamnosus (GG)  1 capsule Oral BID     levofloxacin  250 mg Intravenous Q24H     metroNIDAZOLE  500 mg Intravenous Q12H     mirtazapine  3.75 mg Oral At Bedtime     pantoprazole  40 mg Oral QAM AC     polyethylene glycol  17 g Oral Daily     predniSONE  40 mg Oral Daily     senna-docusate  1 tablet Oral Daily     [START ON 11/5/2021] sodium phosphate  1 enema Rectal Once     Continuous Infusions:         Medications Prior to Admission   Prior to Admission medications    Medication Sig Start Date End Date Taking? Authorizing Provider   acetaminophen (TYLENOL) 500 MG tablet [ACETAMINOPHEN (TYLENOL) 500 MG TABLET] Take 500-1,000 mg by mouth every 6 (six) hours as needed for pain. 8/12/19  Yes Provider, Historical   albuterol (PROAIR HFA;PROVENTIL HFA;VENTOLIN HFA) 90 mcg/actuation inhaler [ALBUTEROL (PROAIR HFA;PROVENTIL HFA;VENTOLIN HFA) 90 MCG/ACTUATION INHALER] Inhale 2 puffs every 6 (six) hours as needed for wheezing. 8/12/19  Yes Provider, Historical   celecoxib (CELEBREX) 200 MG capsule Take 200 mg by mouth 2 times daily as needed  5/18/21  Yes Provider, Historical   hydroxyurea (HYDREA) 500 mg capsule [HYDROXYUREA (HYDREA) 500 MG CAPSULE] Take 500 mg by mouth daily. Monday through Friday 8/12/19  Yes Provider, Historical   mirtazapine (REMERON) 7.5 MG tablet Take 3.75 mg by mouth At Bedtime  8/12/19  Yes Provider, Historical   senna-docusate (SENOKOT-S/PERICOLACE) 8.6-50 MG tablet Take 1 tablet by mouth daily   Yes Unknown, Entered By History   vitamin C (ASCORBIC ACID) 1000 MG TABS Take 1,000 mg by mouth daily   Yes Unknown, Entered By History   Vitamin D3 (CHOLECALCIFEROL) 125 MCG (5000 UT) tablet Take 125 mcg by mouth daily   Yes Unknown, Entered By History          Janes Khalil MD [facility]  Non-Invasive  Cardiologist  [facility]  Pager [phone]

## 2021-11-04 NOTE — CONSULTS
NEPHROLOGY CONSULTATION    REASON FOR CONSULTATION:    Acute kidney injury    HISTORY OF PRESENT ILLNESS:    Baseline creatinine 0.78 (5/19/2021)  Patient is a 89-year-old male with known history of normal baseline renal function as above, he is known to have history of chronic respiratory failure with chronic obstructive pulmonary disease, previous episodes of acute kidney injury, proctitis, he also has history of chronic atrial fibrillation, nonischemic cardiomyopathy, previous pneumonia, he also has history of JAK2 positive myeloproliferative disorder, with macrocytic anemia, he has myelodysplastic syndrome versus myelo fibrosis, he is known to have splenomegaly which has been progressively worsening, chronic leukocytosis, he has been on therapy with hydroxyurea 500 mg daily.    This time patient presented to the hospital on 10/31 with whole body weakness which has been present over the past 2 months border present worsening, also nausea, vomiting, low-grade fevers, he has chronic shortness of breath, also reported poor appetite.    On initial evaluation patient was considered to have probable pneumonia, chest CT at that time demonstrated an infiltrate versus fluid versus atelectasis, he was initiated on IV antibiotics.    At the time of presentation to the emergency department serum creatinine was 1.4, patient received IV fluids with improvement in creatinine down to 1.07 on 10/31 however since that time serum creatinine has been steadily going up, 1.28, 1.51 (11/2), 1.6 (11/3), 1.85 (11/4)    Urine output remains adequate but has been drifting down, when initial presentation urine output was 2100 cc, patient only urinated 150 mL on 11/3, urine output however is improving on 11/4 with a total of 620 mL of urine so far, King catheter shows at least additional 400 mL of clear urine.      REVIEW OF SYSTEMS:   The time of visit patient denies chest pain, shortness of breath is at baseline.  No uremic  symptoms.    Past Medical History:   Diagnosis Date     A-fib (H) 2012     Biventricular ICD (implantable cardioverter-defibrillator) in place 3/30/2009     CHB (complete heart block) (H) 2012     Chronic respiratory failure (H) 2021     Colovesical fistula 3/26/2009     Congestive heart failure (H) 2007    Overview:  Systolic with EF 30-35% on ECHO 2007      COPD, group B, 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 (H) 2013     Dizziness 2019     Episodic mood disorder (H) 2020     Left foot drop 2009     NICM (nonischemic cardiomyopathy) (H) 3/26/2009     Obstructive airway disease (H) 2013     JANETH (obstructive sleep apnea) 10/30/2021     Polycythemia 10/30/2021     Prostate cancer (H) 10/30/2021     Splenomegaly 10/30/2021     Ventricular bigeminy        Social History     Socioeconomic History     Marital status:      Spouse name: Not on file     Number of children: Not on file     Years of education: Not on file     Highest education level: Not on file   Occupational History     Not on file   Tobacco Use     Smoking status: Former Smoker     Packs/day: 1.00     Years: 56.00     Pack years: 56.00     Types: Cigarettes     Quit date:      Years since quittin.8     Smokeless tobacco: Never Used   Substance and Sexual Activity     Alcohol use: Not Currently     Drug use: Never     Sexual activity: Not on file   Other Topics Concern     Not on file   Social History Narrative     Not on file     Social Determinants of Health     Financial Resource Strain:      Difficulty of Paying Living Expenses:    Food Insecurity:      Worried About Running Out of Food in the Last Year:      Ran Out of Food in the Last Year:    Transportation Needs:      Lack of Transportation (Medical):      Lack of Transportation (Non-Medical):    Physical Activity:      Days of Exercise per Week:      Minutes of Exercise per Session:    Stress:      Feeling of Stress :    Social Connections:       "Frequency of Communication with Friends and Family:      Frequency of Social Gatherings with Friends and Family:      Attends [employer] Services:      Active Member of Clubs or Organizations:      Attends Club or Organization Meetings:      Marital Status:    Intimate Partner Violence:      Fear of Current or Ex-Partner:      Emotionally Abused:      Physically Abused:      Sexually Abused:        Family History   Family history unknown: Yes       Allergies   Allergen Reactions     Blood-Group Specific Substance Other (See Comments)     Patient has an anti-C.  Blood product orders may be delayed.  Please draw one red top tube and two lavender top tubes for all Type and Screens/ Type and Crossmatch orders.      Thiopental Nausea and Vomiting       MEDICATIONS:    heparin ANTICOAGULANT  5,000 Units Subcutaneous Q12H     hydroxyurea  500 mg Oral Daily     ipratropium - albuterol 0.5 mg/2.5 mg/3 mL  3 mL Nebulization 4x daily     lactobacillus rhamnosus (GG)  1 capsule Oral BID     levofloxacin  250 mg Intravenous Q24H     metroNIDAZOLE  500 mg Intravenous Q12H     mirtazapine  3.75 mg Oral At Bedtime     pantoprazole  40 mg Oral QAM AC     polyethylene glycol  17 g Oral Daily     predniSONE  40 mg Oral Daily     senna-docusate  1 tablet Oral Daily     [START ON 11/5/2021] sodium phosphate  1 enema Rectal Once         PHYSICAL EXAM    /56 (BP Location: Left arm)   Pulse 77   Temp 97.3  F (36.3  C) (Axillary)   Resp 18   Ht 1.88 m (6' 2\")   Wt 93.7 kg (206 lb 9.6 oz)   SpO2 100%   BMI 26.53 kg/m        Intake/Output Summary (Last 24 hours) at 11/4/2021 1428  Last data filed at 11/4/2021 1347  Gross per 24 hour   Intake 735 ml   Output 1000 ml   Net -265 ml     FiO2 (%): (S) 40 %  Resp: 18      GENERAL: Chronically ill and debilitated  HEAD: Normal  HEENT: Equal pupils. Normal hearing. No eyelid edema.  CARDIOVASCULAR: Able to assess jugular vein pressure, chronic atrial fibrillation present, trace of " peripheral edema present.  PULMONARY: No respiratory distress at rest. No cyanosis, he is receiving oxygen via nasal cannula at 5 L/min, most recent oxygen saturation 100%.  GASTROINTESTINAL: No ascites present  : King catheter in place, clear urine in King bag.  MSK: Diffuse muscle wasting.   NEURO: Alert, no gross focal findings  SKIN: Pale, no jaundice, no rash.    LABORATORIES    Recent Labs   Lab 11/04/21 0559 11/03/21  0553 11/02/21 0527   WBC 47.6* 30.0* 25.6*   HGB 8.0* 7.8* 7.8*   HCT 26.6* 26.7* 27.4*   * 548* 573*     Recent Labs   Lab 11/04/21  0559 11/03/21  0553 11/02/21  0527 11/01/21  0808 11/01/21  0808 10/30/21  0838 10/29/21  2310    142 144   < > 143   < > 141   CO2 27 25 27   < > 27   < > 29   BUN 43* 34* 28   < > 28   < > 36*   ALKPHOS  --   --  142*  --  146*  --  122*   ALT  --   --  <9  --  <9  --  <9   AST  --   --  21  --  21  --  19    < > = values in this interval not displayed.     No results for input(s): INR, PTT in the last 168 hours.    Invalid input(s): APTT  Invalid input(s): FERRITIN  No results for input(s): IRON in the last 168 hours.    Invalid input(s): TIBC    RADIOLOGY    ECHOCARDIOGRAM    ASSESSMENT/PLAN:    1. Acute kidney injury.  Likely acute tubular necrosis secondary to infection vs. Intravascular depletion from diuresis of edema and heart failure.  We will check urine indices in an effort to clarify if the nature of JERED.    Baseline creatinine 0.78 (5/19/2021)    Creatinine 1.6 (10/29)-->-->1.51 (11/2)-->1.6 (11/3)-->, 1.85 (11/4)    Urine output improving.    Urine indices - ordered    Urinalysis (10/30/2021) shows adequate urine specific gravity, albumin 3 mg/dL, no evidence of infection, hyaline casts 6 paraffin suggestive of intravascular depletion.    Renal  ultrasound back in 2019 demonstrated normal renal size, will hold off on repeating ultrasound since a King catheter is in place and good urine is present in King bag at present  (11/4)    Monitor urine output, volume status, electrolytes, renal function    Hold furosemide for now, will reassess in the morning of 11/5.  2. Acute on chronic respiratory failure with hypoxia.  She has underlying chronic obstructive pulmonary disease on home oxygen, presented with findings consistent with congestive heart failure, respiratory distress has improved with administration of IV furosemide.  He seems to be responding well to IV diuretics.    See comments above, patient likely needs some degree of diuretic support.  Edema still present    Holding diuretics at present  3. Chronic Diastolic heart failure.  Left ventricular ejection fraction 50-55%.  Left ventricular hypertrophy present.  Patient has pulmonary hypertension as well.  4. Pulmonary hypertension.  Secondary to chronic obstructive pulmonary disease with some probable additional contribution from heart failure.  5. 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  Chronic anemia.  Known splenomegaly.  Continue management as per [facility].      Cardiac echo 10/30/2021:  Interpretation Summary     Definity contrast utilized  The left ventricle is mildly dilated.  There is mild to moderate concentric left ventricular hypertrophy.  LV systolic function appears lower limits of normal  The visual ejection fraction is 50-55%.  Septal wall motion abnormality may reflect pacemaker activation.  TAPSE is normal, which is consistent with normal right ventricular systolic  function.  Pacemaker lead in the right heart  There is mild (1+) tricuspid regurgitation.  Estimate of RV systolic pressure is elevated at 54mmhg plus right atrial  pressure  Mild to moderate valvular aortic stenosis.Peak velocity 2.6 m/s,mean gradient  14mmhg.2D visualization of the aortic valve suggests the potential for more  significant aortic stenosis then calculated by doppler examination  There is mild (1+) aortic regurgitation.  The  aortic root is moderately dilated.Measures approxmately 4.6cm  Mild to moderate enlargement of the proximal ascending aorta measures  approximately 4.4cm  Consider CT,GABE or MRI (if pacer compatible) to further define the aortic  valve and thoracic aorta  No prior study available for comparison        Chente Ramos MD  Nephrology.

## 2021-11-04 NOTE — PROGRESS NOTES
RESPIRATORY CARE NOTE     Patient Name: Efe Huertas  Today's Date: 11/4/2021       Pt continues to receive duo neb and flutter. BS are dem. Pt is on 2lpm of oxygen via NC, SpO2 is 94%. Post treatment there is increased aeration. Pt also perceives improvement.  RT encouraged deep breathing and coughing techniques .  RT will continue to monitor and assess.

## 2021-11-04 NOTE — PLAN OF CARE
Problem: Gas Exchange Impaired  Goal: Optimal Gas Exchange  Outcome: Improving  Noted O2 85-88% this morning requiring O2 increase to 5L. Able to wean own to 2L currently while sitting in chair. Using IS independently. Denies increases in shortness of breath. Whispers with speaking. Scheduled Solu-Medrol.    Problem: Infection (Pneumonia)  Goal: Resolution of Infection Signs and Symptoms  Outcome: Improving  Afebrile. Denies chills. Continues with scheduled Flagyl and Levaquin.

## 2021-11-04 NOTE — PROGRESS NOTES
Progress Note     Primary Oncologist/Hematologist:  Dr. Zepeda          Assessment and Plan:     IMPRESSION:   COPD/Emphysema with hypoxemic respiratory failure  Jak2 positive myeloproliferative disorder, stable on Hydroxyurea 500 mg oral daily  Macrocytic anemia:  MDS vs myelofibrosis, stable around Hb 8.0 g/dL  Leukocytosis -Infectious workup negative. Could be inflammatory vs. malignancy  Splenomegaly, progressively worsening     PLAN:  1. Leukocytosis may be malignancy related - will check peripheral smear today  2. Would not complete further work-up while inpatient.  Plan for patient to discharge following resolve of acute illness and follow-up in outpatient setting to discuss.   2. Continue Hydroxyurea 500 mg daily    Patient and son seen at bedside.  Discussed the possibility of further work-up depending on peripheral smear.  I indicated that that work-up would Include bone marrow biopsy, however we would complete this in outpatient setting.  We then discussed what would ensue following possible findings of malignancy which would include chemotherapy.  Both son and patient are leaning towards not wanting to undergo treatment if malignancy is identified, therefore they would likely forego bone marrow biopsy. Again, they understand that conversation will continue in outpatient setting with Dr. Zepeda.    Laurel Alonso  [facility]  Office: [phone]  Cell: [phone] Monday through Friday, 8AM-5PM. After hours and weekends, please use answering service.         Interval History:     Patient sitting up eating. Son lying in patient's bed. Both confirm the question of doing bone marrow biopsy if they likely would not want to pursue treatment. Otherwise, patient feels he is slowly improving.               Review of Systems:     The 5 point Review of Systems is negative other than noted in the HPI             Medications:   Scheduled Medications    heparin ANTICOAGULANT  5,000 Units  "Subcutaneous Q12H     hydroxyurea  500 mg Oral Daily     ipratropium - albuterol 0.5 mg/2.5 mg/3 mL  3 mL Nebulization 4x daily     lactobacillus rhamnosus (GG)  1 capsule Oral BID     levofloxacin  250 mg Intravenous Q24H     metroNIDAZOLE  500 mg Intravenous Q12H     mirtazapine  3.75 mg Oral At Bedtime     pantoprazole  40 mg Oral QAM AC     polyethylene glycol  17 g Oral Daily     predniSONE  40 mg Oral Daily     senna-docusate  1 tablet Oral Daily     sodium phosphate  1 enema Rectal Once     PRN Medications  acetaminophen, albuterol, alum & mag hydroxide-simethicone, benzonatate, bisacodyl, bisacodyl, magnesium hydroxide, melatonin, ondansetron, senna-docusate, sodium phosphate **FOLLOWED BY** sodium phosphate, sodium phosphate               Physical Exam:   Vitals were reviewed  Blood pressure 113/56, pulse 77, temperature 97.3  F (36.3  C), temperature source Axillary, resp. rate 18, height 1.88 m (6' 2\"), weight 93.7 kg (206 lb 9.6 oz), SpO2 100 %.  Wt Readings from Last 4 Encounters:   11/04/21 93.7 kg (206 lb 9.6 oz)   09/09/19 101.3 kg (223 lb 6.4 oz)   10/11/19 102.8 kg (226 lb 9.6 oz)   09/03/19 103 kg (227 lb 1.6 oz)       I/O last 3 completed shifts:  In: 435 [P.O.:320; I.V.:115]  Out: 600 [Urine:600]    Constitutional: Awake, alert, cooperative, no apparent distress   Lungs: Moist cough on exam.  Breathing unlabored, on nasal cannula       Abdomen: soft, non-distended, non-tender   Skin: No rashes, no cyanosis, no edema   Other:               Data:   All laboratory data and imaging studies reviewed.    CMP  Recent Labs   Lab 11/04/21  0559 11/03/21  0553 11/02/21  1641 11/02/21  0527 11/01/21  0808 10/30/21  0838 10/29/21  2310    142  --  144 143   < > 141   POTASSIUM 4.6 5.2*  --  5.2* 4.9   < > 4.9   CHLORIDE 106 107  --  109* 109*   < > 103   CO2 27 25  --  27 27   < > 29   ANIONGAP 9 10  --  8 7   < > 9   * 152*  --  96 94   < > 98   BUN 43* 34*  --  28 28   < > 36*   CR 1.85* " 1.60*  --  1.51* 1.28   < > 1.61*   GFRESTIMATED 31* 37*  --  39* 48*   < > 36*   CIRA 9.8 9.5  --  8.9 9.2   < > 10.0   PHOS 3.3  --   --   --   --   --   --    PROTTOTAL  --   --  5.9* 5.4* 5.7*  --  6.4   ALBUMIN 3.2*  --   --  3.2* 3.4*  --  3.9   BILITOTAL  --   --   --  0.5 0.5  --  0.6   ALKPHOS  --   --   --  142* 146*  --  122*   AST  --   --   --  21 21  --  19   ALT  --   --   --  <9 <9  --  <9    < > = values in this interval not displayed.     CBC  Recent Labs   Lab 11/04/21  0559 11/03/21  0553 11/02/21  0527 11/01/21  0808   WBC 47.6* 30.0* 25.6* 32.6*   RBC 2.39* 2.33* 2.34* 2.44*   HGB 8.0* 7.8* 7.8* 8.2*   HCT 26.6* 26.7* 27.4* 28.5*   * 115* 117* 117*   MCH 33.5* 33.5* 33.3* 33.6*   MCHC 30.1* 29.2* 28.5* 28.8*   RDW 17.0* 17.2* 17.2* 17.4*   * 548* 573* 672*     INRNo lab results found in last 7 days.  Data   Results for orders placed or performed during the hospital encounter of 10/29/21 (from the past 24 hour(s))   XR Chest Port 1 View    Narrative    EXAM: XR CHEST PORT 1 VIEW  LOCATION: [facility]  DATE/TIME: 11/3/2021 12:28 PM    INDICATION: s/p left thoracentesis. eval for interval change, rule out pneumo  COMPARISON: 11/02/2021.      Impression    IMPRESSION: There has been interval decrease in left pleural effusion with improved aeration of the retrocardiac left lower lobe. No pneumothorax. Heart size and pulmonary vascularity are unchanged. Mild pulmonary vascular congestion has improved.   Pacemaker defibrillator with leads over the right atrium, right ventricle and coronary sinus.   CBC with Platelets & Differential    Narrative    The following orders were created for panel order CBC with Platelets & Differential.  Procedure                               Abnormality         Status                     ---------                               -----------         ------                     CBC with platelets and d...[[id number]]  Abnormal             Final result               Manual Differential[[id number]]          Abnormal            Final result                 Please view results for these tests on the individual orders.   Renal panel   Result Value Ref Range    Sodium 142 136 - 145 mmol/L    Potassium 4.6 3.5 - 5.0 mmol/L    Chloride 106 98 - 107 mmol/L    Carbon Dioxide (CO2) 27 22 - 31 mmol/L    Anion Gap 9 5 - 18 mmol/L    Urea Nitrogen 43 (H) 8 - 28 mg/dL    Creatinine 1.85 (H) 0.70 - 1.30 mg/dL    Calcium 9.8 8.5 - 10.5 mg/dL    Glucose 144 (H) 70 - 125 mg/dL    Albumin 3.2 (L) 3.5 - 5.0 g/dL    Phosphorus 3.3 2.5 - 4.5 mg/dL    GFR Estimate 31 (L) >60 mL/min/1.73m2   CBC with platelets and differential   Result Value Ref Range    WBC Count 47.6 (H) 4.0 - 11.0 10e3/uL    RBC Count 2.39 (L) 4.40 - 5.90 10e6/uL    Hemoglobin 8.0 (L) 13.3 - 17.7 g/dL    Hematocrit 26.6 (L) 40.0 - 53.0 %     (H) 78 - 100 fL    MCH 33.5 (H) 26.5 - 33.0 pg    MCHC 30.1 (L) 31.5 - 36.5 g/dL    RDW 17.0 (H) 10.0 - 15.0 %    Platelet Count 549 (H) 150 - 450 10e3/uL   Manual Differential   Result Value Ref Range    % Neutrophils 97 %    % Lymphocytes 1 %    % Monocytes 2 %    % Eosinophils 0 %    % Basophils 0 %    Absolute Neutrophils 46.2 (H) 1.6 - 8.3 10e3/uL    Absolute Lymphocytes 0.5 (L) 0.8 - 5.3 10e3/uL    Absolute Monocytes 1.0 0.0 - 1.3 10e3/uL    Absolute Eosinophils 0.0 0.0 - 0.7 10e3/uL    Absolute Basophils 0.0 0.0 - 0.2 10e3/uL    RBC Morphology Confirmed RBC Indices     Platelet Assessment  Automated Count Confirmed. Platelet morphology is normal.     Automated Count Confirmed. Platelet morphology is normal.    Stomatocytes Slight (A) None Seen

## 2021-11-04 NOTE — PROGRESS NOTES
Care Management Follow Up    Length of Stay (days): 4    Expected Discharge Date: 11/05/2021 pending medical status and bed availability at TCU. Would benefit from being vaccinated for COVID-19 prior to discharge (otherwise will need to quarantine for 14 days at a TCU which is extremely hard to find).    Concerns to be Addressed:   Alteration in respiratory status requiring IV Methylprednisolone, IV Levaquin, IV Flagyl, now on CPAP (but oxygen back down to 3 liters this morning).   Care post thoracentesis on 11/3/21. Would benefit from being vaccinated for COVID-19 prior to discharge (otherwise will need to quarantine for 14 days at a TCU which is extremely hard to find).  Patient plan of care discussed at interdisciplinary rounds: Yes    Follow up from rounds/notes:   IV Lasix and IV Methylprednisolone discontinued.     Anticipated Discharge Disposition:  Transitional care (TCU) is recommended.      Anticipated Discharge Services:  Daily therapy and skilled nursing. May need oxygen weaning also (typically on oxygen at 2 liters at baseline).   Anticipated Discharge DME:  Per therapy (if indicated).    Patient/family educated on Medicare website which has current facility and service quality ratings:  yes  Education Provided on the Discharge Plan:  Per team  Patient/Family in Agreement with the Plan:  Yes    Referrals Placed by CM/SW:  See below  Private pay costs discussed: Not applicable     Additional Information:  Patient lives with his son at baseline. His son provides care for patient who is ordinarily on oxygen at 2 liters. However, patient is now requiring heavy/max assist of two to transfer and transitional care is recommended. OF NOTE: patient has not been vaccinated for COVID-19. He will need a vaccination in order to go to a transitional care. His daughter Iliana is primary family contact. On 11/2/21, writer left a message for patient's daughter lIiana to confirm COVID-19 vaccination status. No call  back yet as of this morning.   2:50 PM:  Referrals for TCU have been re-sent to the facilities below (except [facility]).     TCU referrals have been sent to:    [facility] (Declined: closed to admissions this week. Not considering any unvaccinated patients.)    [facility] (left a message for admissions)    [facility] (Declined: not accepting unvaccinated patients. Must wait 2 weeks after receiving the vaccine before they will consider.)    [facility] (currently not taking admissions but will review. However, not accepting unvaccinated patients at this time.)    [facility] (Left a message for admissions)    [facility] (Declined: Closed to admissions at this time)     Lisa Raymundo RN

## 2021-11-05 NOTE — PLAN OF CARE
Problem: Gas Exchange Impaired  Goal: Optimal Gas Exchange  Outcome: Improving   Patient's O2 titrated to 4L NC from 5L NC.

## 2021-11-05 NOTE — PLAN OF CARE
Problem: Adult Inpatient Plan of Care  Goal: Patient-Specific Goal (Individualized)  Outcome: Improving  Pt up in chair with therapy this morning. Later brought down for EGD procedure, drowsy upon arrival back to floor. Able to be awakened by voice and light touch. Pt noted to be mouth breathing, switched from nasal cannula to mask for some time post procedure. Later able to tolerate nasal cannula and maintain O2 sats.     Problem: Adult Inpatient Plan of Care  Goal: Optimal Comfort and Wellbeing  Outcome: Improving   Pt denied any pain this shift. Repositioned to offload pressure from coccyx while in bed.

## 2021-11-05 NOTE — ANESTHESIA POSTPROCEDURE EVALUATION
Patient: Efe Huertas    Procedure: Procedure(s):  ESOPHAGOGASTRODUODENOSCOPY  SIGMOIDOSCOPY, FLEXIBLE       Diagnosis:Proctitis [K62.89]  Dysphagia, unspecified type [R13.10]  Diagnosis Additional Information: No value filed.    Anesthesia Type:  MAC    Note:     Postop Pain Control: Uneventful            Sign Out: Well controlled pain   PONV: No   Neuro/Psych: Uneventful            Sign Out: Acceptable/Baseline neuro status   Airway/Respiratory: Uneventful            Sign Out: Acceptable/Baseline resp. status   CV/Hemodynamics: Uneventful            Sign Out: Acceptable CV status; No obvious hypovolemia; No obvious fluid overload   Other NRE: NONE   DID A NON-ROUTINE EVENT OCCUR? No           Last vitals:  Vitals Value Taken Time   /69 11/05/21 1158   Temp 35.8  C (96.4  F) 11/05/21 1144   Pulse 70 11/05/21 1158   Resp 16 11/05/21 1158   SpO2 95 % 11/05/21 1158       Electronically Signed By: Miguel Zuniga MD  November 5, 2021  12:10 PM

## 2021-11-05 NOTE — ANESTHESIA PREPROCEDURE EVALUATION
Anesthesia Pre-Procedure Evaluation    Patient: Efe Huertas   MRN: [id number] : 1928        Preoperative Diagnosis: Proctitis [K62.89]  Dysphagia, unspecified type [R13.10]    Procedure : Procedure(s):  ESOPHAGOGASTRODUODENOSCOPY  SIGMOIDOSCOPY, FLEXIBLE          Past Medical History:   Diagnosis Date     A-fib (H) 2012     Biventricular ICD (implantable cardioverter-defibrillator) in place 3/30/2009     CHB (complete heart block) (H) 2012     Chronic respiratory failure (H) 2021     Colovesical fistula 3/26/2009     Congestive heart failure (H) 2007    Overview:  Systolic with EF 30-35% on ECHO 2007      COPD, group B, 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 (H) 2013     Dizziness 2019     Episodic mood disorder (H) 2020     Left foot drop 2009     NICM (nonischemic cardiomyopathy) (H) 3/26/2009     Obstructive airway disease (H) 2013     JANETH (obstructive sleep apnea) 10/30/2021     Polycythemia 10/30/2021     Prostate cancer (H) 10/30/2021     Splenomegaly 10/30/2021     Ventricular bigeminy       Past Surgical History:   Procedure Laterality Date     ABDOMINAL AORTIC ANEURYSM REPAIR       AS REPAIR 1 COLLAT ANKLE LIGMNT,PRIMARY       CHOLECYSTECTOMY       COLECTOMY WITH COLOSTOMY, COMBINED       EP ICD / PACEMAKER / LOOP RECORDER      Pacemaker placement     HERNIA REPAIR       HRW VEIN STRIPPER       IMPLANTED DEVICE       MT ANESTH,REMOVAL OF ADRENAL       PROSTATECTOMY       REPAIR BLADDER       TAKEDOWN COLOSTOMY        Allergies   Allergen Reactions     Blood-Group Specific Substance Other (See Comments)     Patient has an anti-C.  Blood product orders may be delayed.  Please draw one red top tube and two lavender top tubes for all Type and Screens/ Type and Crossmatch orders.      Thiopental Nausea and Vomiting      Social History     Tobacco Use     Smoking status: Former Smoker     Packs/day: 1.00     Years: 56.00     Pack years: 56.00     Types: Cigarettes      Quit date:      Years since quittin.8     Smokeless tobacco: Never Used   Substance Use Topics     Alcohol use: Not Currently      Wt Readings from Last 1 Encounters:   21 93.7 kg (206 lb 9.6 oz)        Anesthesia Evaluation            ROS/MED HX  ENT/Pulmonary: Comment: Recent significant pulmonary edema s/p bilateral pleurocentesis (900 ml)    (+) sleep apnea, uses CPAP, severe,  COPD, O2 dependent, recent URI,     Neurologic:  - neg neurologic ROS     Cardiovascular:     (+) -----CHF ARORA. pacemaker, Reason placed: Complete heart block. - Patient is dependent on pacemaker. ICD dysrhythmias, a-fib,     METS/Exercise Tolerance:     Hematologic:  - neg hematologic  ROS     Musculoskeletal:  - neg musculoskeletal ROS     GI/Hepatic: Comment: Dysphagia      Renal/Genitourinary:     (+) renal disease, type: CRI, Pt does not require dialysis,     Endo: Comment: Myeloproliferative disorder      Psychiatric/Substance Use:  - neg psychiatric ROS     Infectious Disease:  - neg infectious disease ROS     Malignancy: Comment: Hx of prostate cancer s/p prostatectomy       Other:  - neg other ROS          Physical Exam    Airway  airway exam normal      Mallampati: II   TM distance: > 3 FB   Neck ROM: limited   Mouth opening: > 3 cm    Respiratory Devices and Support     Nasal Canula      Dental       (+) upper dentures and lower dentures      Cardiovascular   cardiovascular exam normal       Rhythm and rate: regular and normal     Pulmonary   pulmonary exam normal        breath sounds clear to auscultation           OUTSIDE LABS:  CBC:   Lab Results   Component Value Date    WBC 62.3 (HH) 2021    WBC 56.5 (HH) 2021    HGB 8.4 (L) 2021    HGB 8.4 (L) 2021    HCT 28.2 (L) 2021    HCT 28.2 (L) 2021     (H) 2021     (H) 2021     BMP:   Lab Results   Component Value Date     2021     2021    POTASSIUM 4.6 2021    POTASSIUM  4.6 11/04/2021    CHLORIDE 106 11/05/2021    CHLORIDE 106 11/04/2021    CO2 25 11/05/2021    CO2 27 11/04/2021    BUN 51 (H) 11/05/2021    BUN 43 (H) 11/04/2021    CR 1.96 (H) 11/05/2021    CR 1.85 (H) 11/04/2021     11/05/2021     (H) 11/04/2021     COAGS:   Lab Results   Component Value Date    PTT 34 08/12/2019    INR 1.25 (H) 08/12/2019     POC: No results found for: BGM, HCG, HCGS  HEPATIC:   Lab Results   Component Value Date    ALBUMIN 3.2 (L) 11/05/2021    PROTTOTAL 5.9 (L) 11/02/2021    ALT <9 11/02/2021    AST 21 11/02/2021    ALKPHOS 142 (H) 11/02/2021    BILITOTAL 0.5 11/02/2021     OTHER:   Lab Results   Component Value Date    PH 7.28 (L) 11/01/2021    LACT 1.8 11/03/2021    CIRA 9.7 11/05/2021    PHOS 3.2 11/05/2021    MAG 2.1 05/19/2021    TSH 0.73 05/19/2021       Anesthesia Plan    ASA Status:  4   NPO Status:  NPO Appropriate    Anesthesia Type: MAC.     - Reason for MAC: straight local not clinically adequate              Consents    Anesthesia Plan(s) and associated risks, benefits, and realistic alternatives discussed. Questions answered and patient/representative(s) expressed understanding.     - Discussed with:  Patient         Postoperative Care    Pain management: IV analgesics, Oral pain medications, Multi-modal analgesia.   PONV prophylaxis: Ondansetron (or other 5HT-3), Dexamethasone or Solumedrol, Background Propofol Infusion     Comments:                IRENE GAVIN MD

## 2021-11-05 NOTE — PROGRESS NOTES
"Patient is a 89yr M ,admitted on 10/29/2021 for pneumonia, hx of COPD and CHF.  Patient uses home O2 at 2 L NC.  He was started on auto titrating CPAP on 11/1/21 at night but due to increase O2 needs patient switched to BiPAP.  Patient switched back to CPAP auto titrating on 11/4/21.  Currently patient is using Duoneb/Flutter Valve 4 times daily.  Today patient received nebulizer this morning.  He had a Esophagogastroduodenoscopy in surgery today.  He recieved anesthesia for the procedure.  Treatment not given at 12:48 & 15:40 patient's son refused therapy for him at this time, patient sleeping.    /61 (BP Location: Left arm)   Pulse 70   Temp 97.5  F (36.4  C) (Axillary)   Resp 18   Ht 1.88 m (6' 2\")   Wt 93.7 kg (206 lb 9.6 oz)   SpO2 93%   BMI 26.53 kg/m      Continue current treatment and monitor patient for respiratory distress and hypoxemia, titrate O2.      "

## 2021-11-05 NOTE — PROGRESS NOTES
"CLINICAL NUTRITION SERVICES - REASSESSMENT NOTE     Nutrition Prescription    RECOMMENDATIONS FOR MDs/PROVIDERS TO ORDER:  Advance diet as able    Malnutrition Status:    Severe    Recommendations already ordered by Registered Dietitian (RD):  mvi with minerals    Future/Additional Recommendations:  Adjust supplements pending intake/acceptance     EVALUATION OF THE PROGRESS TOWARD GOALS   Diet: NPO for procedure - now CL and ADAT  Puree (level 4) thin liquids, 2 g Na prior  Supplement: Ensure Enlive bid    Intake: Meal intake is fair,  15-75% of meals yesterday with estimated intake 997 kcal, 37 g protein, meeting 40% of estimated kcal and 30% of estimated protein needs   Meal intake not documented 11/3  Receiving LR IVF today     Intake </= 50% x 7 days  Pt sleepy post procedure. Son in room unsure if pt has taken any Ensure. May have taken 1 yesterday with 1 unopened in the room.      NEW FINDINGS   Pt having EGD today with possible esophageal dilation  On 4 L O2 NC    ANTHROPOMETRICS  Height: 188 cm (6' 2\")  Most Recent Weight: 93.4 kg (206 lb) 11/4. Stable  IBW: 86.3 kg  BMI: Overweight BMI 25-29.9  Weight History:       Wt Readings from Last 10 Encounters:   11/01/21 06/01/21 05/19/21 93.4 kg (206 lb)  100.9 kg (222 lb 6.4 oz)  97.6 kg (215 lb )   09/09/19 101.3 kg (223 lb 6.4 oz)   10/11/19 102.8 kg (226 lb 9.6 oz)   09/03/19 103 kg (227 lb 1.6 oz)   08/19/19 101.2 kg (223 lb)   Weight fluctuates with CHF. 7.2% weight loss x 2 months per family report    Dosing Weight: 93.4 kg     ASSESSED NUTRITION NEEDS  Estimated Energy Needs: 6673-1911 kcals/day (25 - 30 kcals/kg)  Justification: Maintenance  Estimated Protein Needs: 112-140 grams protein/day (1.2 - 1.5 grams of pro/kg)  Justification: Repletion  Estimated Fluid Needs: 5354-0249 mL/day (25 - 30 mL/kg)   Justification: Maintenance    PHYSICAL FINDINGS  See malnutrition section below.    GI CONCERNS  Difficultly swallowing-foods getting stuck in " esophogus  Last BM 11/2    LABS  Reviewed  BUN/CR 51/1.9, increased  WBCs rising    MEDICATIONS  Reviewed    MALNUTRITION:  % Weight Loss:  > 5% in 1 month (severe malnutrition)  % Intake: </= 50% > 5 days  Subcutaneous Fat Loss/Muscle loss:  Dtr notices general losses in his face and body. None visible to writer but likely losses from baseline. Would consider mild to moderate based on report  Fluid Retention:  Moderate -with CHF    Malnutrition: Patient meets diagnostic criteria for severe protein calorie malnutrition in the context of acute illness as evidenced by  unintentional weight loss and poor nutrient intake       Previous Goals   Intake > 75% of estimated needs  Maintain weight  Evaluation: Not met    Previous Nutrition Diagnosis  Malnutrition related to acute and chronic illness as evidenced by intake < 75% x 2 months, intake </= 50% > 5 days  and 7.2% weight loss x 2 months  Evaluation: No change    CURRENT NUTRITION DIAGNOSIS  Malnutrition related to chronic illness as evidenced by intake < 75% x 2 months and 7.2% weight loss x 2 months    INTERVENTIONS  Implementation  Multivitamin/mineral supplement therapy  Continue supplements    Goals  Intake > 75% of estimated needs  Maintain weight    Monitoring/Evaluation  Progress toward goals will be monitored and evaluated per protocol.

## 2021-11-05 NOTE — PROGRESS NOTES
RENAL PROGRESS NOTE     CC: Acute kidney injury    ROS: No new issues today.  Patient is very somnolent, he does not respond when calling his name, not able to participate in a review of systems, he is resting in bed, he is receiving oxygen via facemask.    Assessment and Plan:     1. Acute kidney injury - Acute Tubular Necrosis.  Acute tubular necrosis secondary to infection vs. Intravascular depletion from diuresis of edema and heart failure.  We will check urine indices in an effort to clarify if the nature of JERED.    Baseline creatinine 0.78 (5/19/2021)    Creatinine 1.6 (10/29)-->-->1.51 (11/2)-->1.6 (11/3)-->, 1.85 (11/4)-->1.95 (11/5)    Urine output 1500 mL (11/4), so far 450 mL on 11/5    Random urine sodium 66, urine osmolality 403 - Confirm Acute Tubular Necrosis (11/4)    Urinalysis (10/30/2021) shows adequate urine specific gravity, albumin 3 mg/dL, no evidence of infection, hyaline casts 6  suggestive of intravascular depletion at that time.    Renal  ultrasound back in 2019 demonstrated normal renal size, will hold off on repeating ultrasound since a King catheter is in place and good urine is present in King bag at present (11/4)    ATN requires supportive care, no dialysis indication. NO additional IVFs needed.    Gentle diuretic support due to heart failure, Furosemide 40 mg IV daily.  2. Acute on chronic respiratory failure with hypoxia.  She has underlying chronic obstructive pulmonary disease on home oxygen, presented with findings consistent with congestive heart failure, respiratory distress has improved with administration of IV furosemide.  He seems to be responding well to IV diuretics.    See comments above, patient likely needs some degree of diuretic support.  Edema still present    Holding diuretics at present  3. Chronic Diastolic heart failure.  Left ventricular ejection fraction 50-55%.  Left ventricular hypertrophy present.  Patient has pulmonary hypertension as well.  "Diuretic support (see above)  4. Pulmonary hypertension.  Secondary to chronic obstructive pulmonary disease with some probable additional contribution from heart failure.  5. 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  Chronic anemia.  Known splenomegaly.  Continue management as per [facility].        Chente Ramos MD  Nephrology    PHYSICAL EXAM  /62 (BP Location: Left arm)   Pulse 70   Temp 97.3  F (36.3  C) (Axillary)   Resp 18   Ht 1.88 m (6' 2\")   Wt 93.7 kg (206 lb 9.6 oz)   SpO2 97%   BMI 26.53 kg/m        Intake/Output Summary (Last 24 hours) at 11/5/2021 1501  Last data filed at 11/5/2021 1121  Gross per 24 hour   Intake 500 ml   Output 1250 ml   Net -750 ml     Resp: 18    Wt Readings from Last 3 Encounters:   11/04/21 93.7 kg (206 lb 9.6 oz)   09/09/19 101.3 kg (223 lb 6.4 oz)   10/11/19 102.8 kg (226 lb 9.6 oz)       GENERAL: Chronically ill and debilitated.  HEAD: Normal  HEENT: No eyelid edema.  CARDIOVASCULAR: No JVD present.  + peripheral dependent edema  PULMONARY: No respiratory distress while receiving oxygen via FM, No cyanosis  GASTROINTESTINAL: No ascites present  MSK: Diffuse muscle wasting, no joint deformities  NEURO: Very somnolent.   SKIN: Pale, no jaundice, no rash.    LABORATORIES  Recent Labs   Lab 11/05/21  0644 11/04/21  1322 11/04/21  0559   WBC 62.3* 56.5* 47.6*   HGB 8.4* 8.4* 8.0*   HCT 28.2* 28.2* 26.6*   * 545* 549*     Recent Labs   Lab 11/05/21  0644 11/04/21  0559 11/03/21  0553 11/02/21  0527 11/02/21  0527 11/01/21  0808 11/01/21  0808 10/30/21  0838 10/29/21  2310    142 142   < > 144   < > 143   < > 141   CO2 25 27 25   < > 27   < > 27   < > 29   BUN 51* 43* 34*   < > 28   < > 28   < > 36*   ALKPHOS  --   --   --   --  142*  --  146*  --  122*   ALT  --   --   --   --  <9  --  <9  --  <9   AST  --   --   --   --  21  --  21  --  19    < > = values in this interval not displayed.     No results " for input(s): INR, PTT in the last 168 hours.    Invalid input(s): APTT  No results for input(s): ABORH in the last 168 hours.  Invalid input(s): MG    RADIOLOGY REPORTS    ECHOCARDIOGRAM  Cardiac echo 10/30/2021:  Interpretation Summary     Definity contrast utilized  The left ventricle is mildly dilated.  There is mild to moderate concentric left ventricular hypertrophy.  LV systolic function appears lower limits of normal  The visual ejection fraction is 50-55%.  Septal wall motion abnormality may reflect pacemaker activation.  TAPSE is normal, which is consistent with normal right ventricular systolic  function.  Pacemaker lead in the right heart  There is mild (1+) tricuspid regurgitation.  Estimate of RV systolic pressure is elevated at 54mmhg plus right atrial  pressure  Mild to moderate valvular aortic stenosis.Peak velocity 2.6 m/s,mean gradient  14mmhg.2D visualization of the aortic valve suggests the potential for more  significant aortic stenosis then calculated by doppler examination  There is mild (1+) aortic regurgitation.  The aortic root is moderately dilated.Measures approxmately 4.6cm  Mild to moderate enlargement of the proximal ascending aorta measures  approximately 4.4cm  Consider CT,GABE or MRI (if pacer compatible) to further define the aortic  valve and thoracic aorta  No prior study available for comparison          MEDICATIONS    heparin ANTICOAGULANT  5,000 Units Subcutaneous Q12H     hydroxyurea  1,000 mg Oral Daily     ipratropium - albuterol 0.5 mg/2.5 mg/3 mL  3 mL Nebulization 4x daily     lactobacillus rhamnosus (GG)  1 capsule Oral BID     levofloxacin  250 mg Intravenous Q24H     metroNIDAZOLE  500 mg Intravenous Q12H     mirtazapine  3.75 mg Oral At Bedtime     pantoprazole  40 mg Oral QAM AC     polyethylene glycol  17 g Oral Daily     senna-docusate  1 tablet Oral Daily     acetaminophen, albuterol, alum & mag hydroxide-simethicone, benzonatate, bisacodyl, bisacodyl,  flumazenil, magnesium hydroxide, melatonin, naloxone, naloxone, naloxone, naloxone, ondansetron, senna-docusate, [COMPLETED] sodium phosphate **FOLLOWED BY** sodium phosphate, sodium phosphate      Above laboratories and medications were personally reviewed during evaluation today.

## 2021-11-05 NOTE — PROGRESS NOTES
Daily Progress Note        CODE STATUS:  No CPR- Pre-arrest intubation OK    11/02/21  Assessment/Plan:  Efe Huertas is a 89 year old old male with past medical history of atrial fibrillation, COPD on 2 L nasal cannula, JANTEH on CPAP, CHF, myeloproliferative disorder who presented to ED for evaluation of generalized malaise, fatigue, poor appetite and admitted for further management.    Acute on chronic respiratory failure with hypoxia; likely multifactorial-possible pneumonia, acute CHF with preserved EF, COPD exacerbation, pleural effusion, physical deconditioning.    Patient with history of COPD on 2-3 L nasal cannula at home and JANETH on CPAP  --On admission, CT  A/P reported bilateral atelectasis or infiltrate and small pleural effusions.  --On 11/1, CXR reported findings consistent with CHF  --On admission normal procalcitonin and normal lactic acid.  --On admission, elevated WBC count.   --On 11/1 night required BiPAP for worsening respiratory status.  --S/p right thoracocentesis on 11/2 and left thoracocentesis on 11/3 with removal of 900 cc from each side  --Patient initiated on DuoNeb, titrate.  IV Solu-Medrol changed to oral prednisone on 11/4.   --On Levaquin, continue for total of 5 to 7 days per pulmonary.  Of note, normal procalcitonin  --Received IV Lasix 40 mg every 6 hour and also dose of metolazone on 11/3 per cardiology, now creatinine trending up, Lasix discontinued.  --Breathing status improved to almost baseline now.  --Appreciated cardiology and pulmonary input.  Discussed with pulmonologist, Dr Christina reported pleural fluid analysis so far fairly unremarkable and can follow-up with him as an outpatient and signed off.    Dysphagia, unspecified;  Possible rectal wall thickening on CT, possible proctitis.  However, no complaint of abdominal pain  --Discussed with gastroenterologist today, reported plan for EGD and flex sigmoidoscopy tomorrow.  Discussed risk of respiratory complication post  procedure.  --On IV Levaquin and Flagyl, continue.     Myeloproliferative disorder;  Microcytic anemia;  Leukocytosis; worsening  --Appreciated oncology input, reported MDS versus myelofibrosis, may need bone marrow biopsy to clarify in future.  --On hydroxyurea, continue  --WBC elevated on admission, seems elevated since 5/2021.  However, now significantly worsening which seems correlating with initiation of steroid.  Of note, patient afebrile and clinically improving, UC no growth, BC no growth, procalcitonin negative.  Per pulmonary, pleural fluid analysis fairly unremarkable for infection.  Respiratory panel PCR negative.  --Will defer management to oncology    Acute kidney injury; ? Cause.  CHF exacerbation versus overdiuresis  --Home Celebrex on hold  --Admission CT abdomen/pelvis negative for hydronephrosis  --On admission, received IV fluid with improvement on creatinine but concern for CHF and initiated on IV diuretics, creatinine trending up.  Did received a schedule IV Lasix and also dose of metolazone on 11/3.  Currently diuretic discontinued.  Appreciated nephrology input.    H/o Afib;  --has ICD in place  --not anticoagulated due to h/o multiple falls in the past per cardiology note.     Nonsustained ventricular tachycardia on device check per cardiology;  --Appreciated cardiology input, reported no ischemic evaluation at this time, also reported previously weaned off beta-blocker due to lightheadedness and recommended continue to hold at this time.    Severe protein malnutrition per RD;  --Continue supplements.    DVT prophylaxis; subcu heparin      Disposition; anticipate TCU  Barrier to discharge; procedures     LOS: 2 days     Subjective:  Interval History: Patient seen and examined. Notes, labs, imaging reports personally reviewed.  Overnight patient refused CPAP.  This morning patient reported breathing improving, no chest pain, cough, abdominal pain, asking for urinal.  Denied feeling feverish.   Discussed with nursing staffs.  Patient is scheduled to have EGD and flexible sigmoidoscopy later today.  Addendum;  Patient underwent EGD and flex sigmoidoscopy, procedure note reviewed, also received message from gastroenterologist.  Seen again in afternoon.  Checked few times in her room as used to see family members at bedside most of the time, no family members at bedside, no acute or urgent issues, will discuss with family members tomorrow.    Review of Systems:   As mentioned in subjective.    Patient Active Problem List   Diagnosis     A-fib (H)     NICM (nonischemic cardiomyopathy) (H)     CHB (complete heart block) (H)     Colovesical fistula     Congestive heart failure (H)     Encounter for servicing of implantable cardioverter-defibrillator (ICD) at end of battery life     Biventricular ICD (implantable cardioverter-defibrillator) in place     Obstructive airway disease (H)     Dizziness     Pre-syncope     Ventricular bigeminy     Constipation     Pneumonia of both lungs due to infectious organism, unspecified part of lung     Chronic respiratory failure (H)     COPD, group B, 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 (H)     Episodic mood disorder (H)     Left foot drop     Mobitz type II atrioventricular block     Leukocytosis, unspecified type     JERED (acute kidney injury) (H)     Proctitis     Myeloproliferative disorder (H)     Splenomegaly     Prostate cancer (H)     Polycythemia     JANETH (obstructive sleep apnea)     Acute on chronic respiratory failure with hypoxia (H)       Scheduled Meds:    heparin ANTICOAGULANT  5,000 Units Subcutaneous Q12H     hydroxyurea  500 mg Oral Daily     ipratropium - albuterol 0.5 mg/2.5 mg/3 mL  3 mL Nebulization 4x daily     lactobacillus rhamnosus (GG)  1 capsule Oral BID     levofloxacin  250 mg Intravenous Q24H     metroNIDAZOLE  500 mg Intravenous Q12H     mirtazapine  3.75 mg Oral At Bedtime     pantoprazole  40 mg Oral QAM AC     polyethylene glycol  17 g Oral Daily      predniSONE  40 mg Oral Daily     senna-docusate  1 tablet Oral Daily     sodium phosphate  1 enema Rectal Once     Continuous Infusions:    PRN Meds:.acetaminophen, albuterol, alum & mag hydroxide-simethicone, benzonatate, bisacodyl, bisacodyl, magnesium hydroxide, melatonin, ondansetron, senna-docusate, sodium phosphate **FOLLOWED BY** sodium phosphate, sodium phosphate    Objective:  Vital signs in last 24 hours:  Temp:  [97.3  F (36.3  C)-97.4  F (36.3  C)] 97.4  F (36.3  C)  Pulse:  [70-77] 70  Resp:  [18-20] 18  BP: (113-143)/(56-70) 143/70  SpO2:  [90 %-96 %] 90 %      Intake/Output Summary (Last 24 hours) at 11/2/2021 0810  Last data filed at 11/1/2021 1800  Gross per 24 hour   Intake 140 ml   Output 400 ml   Net -260 ml       Physical Exam:  General: Not in obvious distress.  HEENT: Normocephalic, normocephalic  Chest: Clear to auscultation bilateral anteriorly, no wheezing, unlabored breathing  Heart: S1S2 normal, regular  Abdomen: Soft. NT, ND. Bowel sounds- active.  Extremities: No legs swelling  Neuro: More alert and awake, follows simple commands, hard of hearing, moves all extremities    Lab Results:(I have personally reviewed the results)    Recent Results (from the past 24 hour(s))   CBC with platelets and differential    Collection Time: 11/04/21  1:22 PM   Result Value Ref Range    WBC Count 56.5 (HH) 4.0 - 11.0 10e3/uL    RBC Count 2.56 (L) 4.40 - 5.90 10e6/uL    Hemoglobin 8.4 (L) 13.3 - 17.7 g/dL    Hematocrit 28.2 (L) 40.0 - 53.0 %     (H) 78 - 100 fL    MCH 32.8 26.5 - 33.0 pg    MCHC 29.8 (L) 31.5 - 36.5 g/dL    RDW 17.0 (H) 10.0 - 15.0 %    Platelet Count 545 (H) 150 - 450 10e3/uL   Reticulocyte count    Collection Time: 11/04/21  1:22 PM   Result Value Ref Range    % Reticulocyte 3.2 (H) 0.8 - 2.7 %    Absolute Reticulocyte 0.081 0.010 - 0.110 10e6/uL   Manual Differential    Collection Time: 11/04/21  1:22 PM   Result Value Ref Range    % Neutrophils 98 %    % Lymphocytes 0 %    %  Monocytes 2 %    % Eosinophils 0 %    % Basophils 0 %    Absolute Neutrophils 55.4 (H) 1.6 - 8.3 10e3/uL    Absolute Lymphocytes 0.0 (L) 0.8 - 5.3 10e3/uL    Absolute Monocytes 1.1 0.0 - 1.3 10e3/uL    Absolute Eosinophils 0.0 0.0 - 0.7 10e3/uL    Absolute Basophils 0.0 0.0 - 0.2 10e3/uL    RBC Morphology Confirmed RBC Indices     Platelet Assessment  Automated Count Confirmed. Platelet morphology is normal.     Automated Count Confirmed. Platelet morphology is normal.    Polychromasia Slight (A) None Seen    Stomatocytes Slight (A) None Seen   Sodium random urine    Collection Time: 11/04/21  3:15 PM   Result Value Ref Range    Sodium Urine mmol/L 66 mmol/L   Osmolality urine    Collection Time: 11/04/21  3:15 PM   Result Value Ref Range    Osmolality Urine 402 300 - 900 mOsm/kg   CBC with platelets    Collection Time: 11/05/21  6:44 AM   Result Value Ref Range    WBC Count 62.3 (HH) 4.0 - 11.0 10e3/uL    RBC Count 2.53 (L) 4.40 - 5.90 10e6/uL    Hemoglobin 8.4 (L) 13.3 - 17.7 g/dL    Hematocrit 28.2 (L) 40.0 - 53.0 %     (H) 78 - 100 fL    MCH 33.2 (H) 26.5 - 33.0 pg    MCHC 29.8 (L) 31.5 - 36.5 g/dL    RDW 17.0 (H) 10.0 - 15.0 %    Platelet Count 550 (H) 150 - 450 10e3/uL   B-Type Natriuretic Peptide ([facility])    Collection Time: 11/05/21  6:44 AM   Result Value Ref Range     (H) 0 - 93 pg/mL   Renal panel    Collection Time: 11/05/21  6:44 AM   Result Value Ref Range    Sodium 140 136 - 145 mmol/L    Potassium 4.6 3.5 - 5.0 mmol/L    Chloride 106 98 - 107 mmol/L    Carbon Dioxide (CO2) 25 22 - 31 mmol/L    Anion Gap 9 5 - 18 mmol/L    Urea Nitrogen 51 (H) 8 - 28 mg/dL    Creatinine 1.96 (H) 0.70 - 1.30 mg/dL    Calcium 9.7 8.5 - 10.5 mg/dL    Glucose 113 70 - 125 mg/dL    Albumin 3.2 (L) 3.5 - 5.0 g/dL    Phosphorus 3.2 2.5 - 4.5 mg/dL    GFR Estimate 29 (L) >60 mL/min/1.73m2         Serum Glucose range:   Recent Labs   Lab 11/05/21  0644 11/04/21  0559 11/03/21  0553 11/02/21  0527     144* 152* 96     ABG:   Recent Labs   Lab 11/01/21  2308   PH 7.28*   PCO2 55*   PO2 58*   HCO3 23   O2PER 50     CBC:   Recent Labs   Lab 11/05/21  0644 11/04/21  1322 11/04/21  0559   WBC 62.3* 56.5* 47.6*   HGB 8.4* 8.4* 8.0*   HCT 28.2* 28.2* 26.6*   * 110* 111*   * 545* 549*   NEUTROPHIL  --  98 97   LYMPH  --  0 1   MONOCYTE  --  2 2   EOSINOPHIL  --  0 0     Chemistry:   Recent Labs   Lab 11/05/21  0644 11/04/21  0559 11/03/21  0553 11/02/21  1641 11/02/21  0527 11/02/21  0527 11/01/21  0808 11/01/21  0808 10/30/21  0838 10/29/21  2310    142 142  --    < > 144   < > 143   < > 141   POTASSIUM 4.6 4.6 5.2*  --    < > 5.2*   < > 4.9   < > 4.9   CHLORIDE 106 106 107  --    < > 109*   < > 109*   < > 103   CO2 25 27 25  --    < > 27   < > 27   < > 29   BUN 51* 43* 34*  --    < > 28   < > 28   < > 36*   CR 1.96* 1.85* 1.60*  --    < > 1.51*   < > 1.28   < > 1.61*   GFRESTIMATED 29* 31* 37*  --    < > 39*   < > 48*   < > 36*   CIRA 9.7 9.8 9.5  --    < > 8.9   < > 9.2   < > 10.0   PROTTOTAL  --   --   --  5.9*  --  5.4*  --  5.7*  --  6.4   ALBUMIN 3.2* 3.2*  --   --   --  3.2*   < > 3.4*  --  3.9   AST  --   --   --   --   --  21  --  21  --  19   ALT  --   --   --   --   --  <9  --  <9  --  <9   ALKPHOS  --   --   --   --   --  142*  --  146*  --  122*   BILITOTAL  --   --   --   --   --  0.5  --  0.5  --  0.6    < > = values in this interval not displayed.     Coags:  No results for input(s): INR, PROTIME, PTT in the last 168 hours.    Invalid input(s): APTT  Cardiac Markers:  Recent Labs   Lab 10/30/21  0838   TROPONINI 0.04        Chest XR,  PA & LAT    Result Date: 10/30/2021  EXAM: XR CHEST 2 VW LOCATION: [facility] DATE/TIME: 10/30/2021 12:01 AM INDICATION: fatigue COMPARISON: 08/12/2019     IMPRESSION: Heart size prominent on this AP view but unchanged. Pulmonary vascularity normal. Subsegmental atelectasis or scarring in the bases. Small bilateral pleural  effusions posteriorly in the costophrenic angles. Upper lung fields are clear. Pacemaker lead tips right atrium and right ventricle. Clips upper abdomen.    Echocardiogram Complete    Result Date: 10/30/2021  [id number]^[place] [address]  Name: GEE OGDEN MRN: [id number] : 1928 Study Date: 10/30/2021 10:42 AM Age: 89 yrs Gender: Male Patient Location: [facility] Reason For Study: Chest Discomfort Ordering Physician: KAIDEN LUCAS Performed By: JUANA  BSA: 2.2 m2 Height: 74 in Weight: 203 lb HR: 78 ______________________________________________________________________________ Procedure Definity ([facility]C #98862-983) given intravenously. ______________________________________________________________________________ Interpretation Summary  Definity contrast utilized The left ventricle is mildly dilated. There is mild to moderate concentric left ventricular hypertrophy. LV systolic function appears lower limits of normal The visual ejection fraction is 50-55%. Septal wall motion abnormality may reflect pacemaker activation. TAPSE is normal, which is consistent with normal right ventricular systolic function. Pacemaker lead in the right heart There is mild (1+) tricuspid regurgitation. Estimate of RV systolic pressure is elevated at 54mmhg plus right atrial pressure Mild to moderate valvular aortic stenosis.Peak velocity 2.6 m/s,mean gradient 14mmhg.2D visualization of the aortic valve suggests the potential for more significant aortic stenosis then calculated by doppler examination There is mild (1+) aortic regurgitation. The aortic root is moderately dilated.Measures approxmately 4.6cm Mild to moderate enlargement of the proximal ascending aorta measures approximately 4.4cm Consider CT,GABE or MRI (if pacer compatible) to further define the aortic valve and thoracic aorta No prior study available for comparison  ______________________________________________________________________________ Left Ventricle The left ventricle is mildly dilated. There is mild to moderate concentric left ventricular hypertrophy. Diastolic Doppler findings (E/E' ratio and/or other parameters) suggest left ventricular filling pressures are normal. The visual ejection fraction is 50-55%. LV systolic function appears lower limits of normal. Septal wall motion abnormality may reflect pacemaker activation.  Right Ventricle The right ventricle is not well visualized. The right ventricle is mildly dilated. TAPSE is normal, which is consistent with normal right ventricular systolic function. The right ventricular systolic function is normal. There is a pacemaker lead in the right ventricle.  Atria The left atrium is mild to moderately dilated. Pacer wire in right atrium. Right atrium not well visualized. The right atrium is mildly dilated.  Mitral Valve The mitral valve leaflets appear thickened, but open well. There is moderate mitral annular calcification. There is mild (1+) mitral regurgitation.  Tricuspid Valve There is mild (1+) tricuspid regurgitation. Moderate (46-55mmHg) pulmonary hypertension is present. The right ventricular systolic pressure is approximated at 53.5 mmHg plus the right atrial pressure.  Aortic Valve Moderate to severe aortic valve leaflet calcification. There is mild (1+) aortic regurgitation. Mild to moderate valvular aortic stenosis.  Pulmonic Valve There is mild (1+) pulmonic valvular regurgitation.  Vessels The aortic root is moderately dilated.Measures approxmately 4.6cm. Mild to moderate enlargement of the proximal ascending aorta measures approximately 4.4cm. IVC diameter <2.1 cm collapsing >50% with sniff suggests a normal RA pressure of 3 mmHg.  Pericardium Trivial pericardial effusion.  ______________________________________________________________________________ MMode/2D Measurements & Calculations IVSd: 1.4 cm  LVIDd: 6.9 cm LVIDs: 4.0 cm LVPWd: 1.4 cm  FS: 42.1 % LV mass(C)d: 479.8 grams LV mass(C)dI: 219.4 grams/m2 Ao root diam: 4.6 cm LA dimension: 4.6 cm asc Aorta Diam: 4.7 cm LA/Ao: 1.0 LVOT diam: 3.0 cm LVOT area: 7.2 cm2 RWT: 0.40  Time Measurements MM HR: 74.0 BPM  Doppler Measurements & Calculations MV E max indio: 67.3 cm/sec MV A max indio: 90.3 cm/sec MV E/A: 0.75 MV dec slope: 326.6 cm/sec2 MV dec time: 0.21 sec Ao V2 max: 232.4 cm/sec Ao max P.0 mmHg Ao V2 mean: 177.2 cm/sec Ao mean P.3 mmHg Ao V2 VTI: 54.3 cm DEEPIKA(I,D): 2.5 cm2 DEEPIKA(V,D): 3.2 cm2 AI P1/2t: 518.4 msec LV V1 max P.2 mmHg LV V1 max: 102.3 cm/sec LV V1 VTI: 19.1 cm  SV(LVOT): 137.7 ml SI(LVOT): 63.0 ml/m2 PA acc time: 0.07 sec PI end-d indio: 132.7 cm/sec TR max indio: 365.9 cm/sec TR max P.5 mmHg AV Indio Ratio (DI): 0.44 DEEPIKA Index (cm2/m2): 1.2 E/E' av.9 Lateral E/e': 6.7 Medial E/e': 11.0  ______________________________________________________________________________ Report approved by: Trev Jason 10/30/2021 12:53 PM       XR Chest Port 1 View    Result Date: 2021  EXAM: XR CHEST PORT 1 VIEW LOCATION: [facility] DATE/TIME: 2021 11:10 AM INDICATION: persistent hypoxia COMPARISON: 10/29/2021     IMPRESSION: Stable position multilead left subclavian pacemaker. Stable heart size. There is cephalization of blood flow, and small pleural effusions, increased from previous. These findings suggest CHF. Also, there is new left mid to inferior chest opacity which could represent asymmetric pulmonary edema, or pneumonia.    CT Abdomen Pelvis w Contrast  Result Date: 10/30/2021  IMPRESSION: 1.  Bibasilar atelectasis or infiltrate and small pleural effusions. 2.  Splenomegaly. 3.  The wall thickening not excluded. Correlate for proctitis. 4.  Diverticulosis. 5.  Remainder similar to previous.    CT Head w/o Contrast  Result Date: 10/31/2021  IMPRESSION: 1.  No CT evidence for acute intracranial process.  2.  Brain atrophy and presumed chronic microvascular ischemic changes as above. 3.  No change.      Latest radiology report personally reviewed.    Note created using dragon voice recognition software so sounds alike errors may have escaped editing.    11/05/2021   SADE RAMSEY MD  HOSPITALIST, [facility]  PAGER NO. [phone]

## 2021-11-05 NOTE — H&P
GENERAL PRE-PROCEDURE:   Procedure:  EGD and Flexible sigmoidoscopy  Date/Time:  11/5/2021 9:53 AM    Verbal consent obtained?: Yes    Written consent obtained?: Yes    Risks and benefits: Risks, benefits and alternatives were discussed    Consent given by:  Patient  Patient states understanding of procedure being performed: Yes    Patient's understanding of procedure matches consent: Yes    Procedure consent matches procedure scheduled: Yes    Expected level of sedation:  Moderate  Appropriately NPO:  Yes  ASA Class:  3  Mallampati  :  Grade 1- soft palate, uvula, tonsillar pillars, and posterior pharyngeal wall visible  Lungs:  Other (comment)  Lung exam comment:  Equal air entry b/l.   Heart:  Normal heart sounds and rate  History & Physical reviewed:  History and physical reviewed and no updates needed  Statement of review:  I have reviewed the lab findings, diagnostic data, medications, and the plan for sedation

## 2021-11-05 NOTE — PROGRESS NOTES
Progress Note     Primary Oncologist/Hematologist:  Dr. 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 With rapid rise in WBC - this is likely leukemoid reaction, need to increase Hydrea back up to 1000 mg to reduce risk for circulatory complications.   2. Await peripheral smear.     Discussed with Dr. Hernandez. He is fairly confident this is just a reaction versus active infection or malignancy.  Hoping to see stabilization of leukocytosis over the weekend.    Laurel Alonso  [facility]  Office: [phone]  Cell: [phone] Monday through Friday, 8AM-5PM. After hours and weekends, please use answering service.         Interval History:     Patient sleeping comfortably. Spoke with son and described as best I could what we feel is going on with white counts. I also wrote info on the board.                Review of Systems:     The 5 point Review of Systems is negative other than noted in the HPI             Medications:   Scheduled Medications    [Auto Hold] heparin ANTICOAGULANT  5,000 Units Subcutaneous Q12H     [Auto Hold] ipratropium - albuterol 0.5 mg/2.5 mg/3 mL  3 mL Nebulization 4x daily     [Auto Hold] lactobacillus rhamnosus (GG)  1 capsule Oral BID     [Auto Hold] levofloxacin  250 mg Intravenous Q24H     [Auto Hold] metroNIDAZOLE  500 mg Intravenous Q12H     [Auto Hold] mirtazapine  3.75 mg Oral At Bedtime     [Auto Hold] pantoprazole  40 mg Oral QAM AC     [Auto Hold] polyethylene glycol  17 g Oral Daily     [Auto Hold] senna-docusate  1 tablet Oral Daily     sodium chloride (PF)  3 mL Intracatheter Q8H     sodium chloride (PF)  3 mL  "Intracatheter Q8H     [Auto Hold] sodium phosphate  1 enema Rectal Once     PRN Medications  [Auto Hold] acetaminophen, [Auto Hold] albuterol, [Auto Hold] alum & mag hydroxide-simethicone, [Auto Hold] benzonatate, [Auto Hold] bisacodyl, [Auto Hold] bisacodyl, lidocaine 4%, lidocaine 4%, lidocaine (buffered or not buffered), lidocaine (buffered or not buffered), [Auto Hold] magnesium hydroxide, [Auto Hold] melatonin, ondansetron, [Auto Hold] ondansetron, [Auto Hold] senna-docusate, sodium chloride (PF), sodium chloride (PF), [Auto Hold] sodium phosphate **FOLLOWED BY** [Auto Hold] sodium phosphate, [Auto Hold] sodium phosphate               Physical Exam:   Vitals were reviewed  Blood pressure (!) 149/74, pulse 70, temperature 97.5  F (36.4  C), temperature source Oral, resp. rate 18, height 1.88 m (6' 2\"), weight 93.7 kg (206 lb 9.6 oz), SpO2 97 %.  Wt Readings from Last 4 Encounters:   11/04/21 93.7 kg (206 lb 9.6 oz)   09/09/19 101.3 kg (223 lb 6.4 oz)   10/11/19 102.8 kg (226 lb 9.6 oz)   09/03/19 103 kg (227 lb 1.6 oz)       I/O last 3 completed shifts:  In: 600 [P.O.:600]  Out: 1500 [Urine:1500]    Constitutional: Sleeping comfortably    Lungs: Breathing unlabored, on mask                              Data:   All laboratory data and imaging studies reviewed.    CMP  Recent Labs   Lab 11/05/21  0644 11/04/21  0559 11/03/21  0553 11/02/21  1641 11/02/21  0527 11/01/21  0808 11/01/21  0808 10/30/21  0838 10/29/21  2310    142 142  --  144   < > 143   < > 141   POTASSIUM 4.6 4.6 5.2*  --  5.2*   < > 4.9   < > 4.9   CHLORIDE 106 106 107  --  109*   < > 109*   < > 103   CO2 25 27 25  --  27   < > 27   < > 29   ANIONGAP 9 9 10  --  8   < > 7   < > 9    144* 152*  --  96   < > 94   < > 98   BUN 51* 43* 34*  --  28   < > 28   < > 36*   CR 1.96* 1.85* 1.60*  --  1.51*   < > 1.28   < > 1.61*   GFRESTIMATED 29* 31* 37*  --  39*   < > 48*   < > 36*   CIRA 9.7 9.8 9.5  --  8.9   < > 9.2   < > 10.0   PHOS 3.2 3.3  " --   --   --   --   --   --   --    PROTTOTAL  --   --   --  5.9* 5.4*  --  5.7*  --  6.4   ALBUMIN 3.2* 3.2*  --   --  3.2*  --  3.4*  --  3.9   BILITOTAL  --   --   --   --  0.5  --  0.5  --  0.6   ALKPHOS  --   --   --   --  142*  --  146*  --  122*   AST  --   --   --   --  21  --  21  --  19   ALT  --   --   --   --  <9  --  <9  --  <9    < > = values in this interval not displayed.     CBC  Recent Labs   Lab 11/05/21  0644 11/04/21  1322 11/04/21  0559 11/03/21  0553   WBC 62.3* 56.5* 47.6* 30.0*   RBC 2.53* 2.56* 2.39* 2.33*   HGB 8.4* 8.4* 8.0* 7.8*   HCT 28.2* 28.2* 26.6* 26.7*   * 110* 111* 115*   MCH 33.2* 32.8 33.5* 33.5*   MCHC 29.8* 29.8* 30.1* 29.2*   RDW 17.0* 17.0* 17.0* 17.2*   * 545* 549* 548*     INRNo lab results found in last 7 days.  Data   Results for orders placed or performed during the hospital encounter of 10/29/21 (from the past 24 hour(s))   Lab Blood Morphology Pathologist Review    Narrative    The following orders were created for panel order Lab Blood Morphology Pathologist Review.  Procedure                               Abnormality         Status                     ---------                               -----------         ------                     Bld morphology pathology...[[id number]]                      In process                 CBC with platelets and d...[[id number]]  Abnormal            Final result               Manual Differential[[id number]]          Abnormal            Final result               Reticulocyte count[[id number]]           Abnormal            Final result               Morphology Tracking[[id number]]                              Final result                 Please view results for these tests on the individual orders.   CBC with platelets and differential   Result Value Ref Range    WBC Count 56.5 () 4.0 - 11.0 10e3/uL    RBC Count 2.56 (L) 4.40 - 5.90 10e6/uL    Hemoglobin 8.4 (L) 13.3 - 17.7 g/dL    Hematocrit 28.2 (L) 40.0 - 53.0 %    MCV  110 (H) 78 - 100 fL    MCH 32.8 26.5 - 33.0 pg    MCHC 29.8 (L) 31.5 - 36.5 g/dL    RDW 17.0 (H) 10.0 - 15.0 %    Platelet Count 545 (H) 150 - 450 10e3/uL   Reticulocyte count   Result Value Ref Range    % Reticulocyte 3.2 (H) 0.8 - 2.7 %    Absolute Reticulocyte 0.081 0.010 - 0.110 10e6/uL   Manual Differential   Result Value Ref Range    % Neutrophils 98 %    % Lymphocytes 0 %    % Monocytes 2 %    % Eosinophils 0 %    % Basophils 0 %    Absolute Neutrophils 55.4 (H) 1.6 - 8.3 10e3/uL    Absolute Lymphocytes 0.0 (L) 0.8 - 5.3 10e3/uL    Absolute Monocytes 1.1 0.0 - 1.3 10e3/uL    Absolute Eosinophils 0.0 0.0 - 0.7 10e3/uL    Absolute Basophils 0.0 0.0 - 0.2 10e3/uL    RBC Morphology Confirmed RBC Indices     Platelet Assessment  Automated Count Confirmed. Platelet morphology is normal.     Automated Count Confirmed. Platelet morphology is normal.    Polychromasia Slight (A) None Seen    Stomatocytes Slight (A) None Seen   Sodium random urine   Result Value Ref Range    Sodium Urine mmol/L 66 mmol/L   Osmolality urine   Result Value Ref Range    Osmolality Urine 402 300 - 900 mOsm/kg   CBC with platelets   Result Value Ref Range    WBC Count 62.3 (HH) 4.0 - 11.0 10e3/uL    RBC Count 2.53 (L) 4.40 - 5.90 10e6/uL    Hemoglobin 8.4 (L) 13.3 - 17.7 g/dL    Hematocrit 28.2 (L) 40.0 - 53.0 %     (H) 78 - 100 fL    MCH 33.2 (H) 26.5 - 33.0 pg    MCHC 29.8 (L) 31.5 - 36.5 g/dL    RDW 17.0 (H) 10.0 - 15.0 %    Platelet Count 550 (H) 150 - 450 10e3/uL   B-Type Natriuretic Peptide ([facility] Only)   Result Value Ref Range     (H) 0 - 93 pg/mL   Renal panel   Result Value Ref Range    Sodium 140 136 - 145 mmol/L    Potassium 4.6 3.5 - 5.0 mmol/L    Chloride 106 98 - 107 mmol/L    Carbon Dioxide (CO2) 25 22 - 31 mmol/L    Anion Gap 9 5 - 18 mmol/L    Urea Nitrogen 51 (H) 8 - 28 mg/dL    Creatinine 1.96 (H) 0.70 - 1.30 mg/dL    Calcium 9.7 8.5 - 10.5 mg/dL    Glucose 113 70 - 125 mg/dL    Albumin 3.2 (L) 3.5 - 5.0  g/dL    Phosphorus 3.2 2.5 - 4.5 mg/dL    GFR Estimate 29 (L) >60 mL/min/1.73m2

## 2021-11-05 NOTE — PLAN OF CARE
Problem: Oral Intake Inadequate COPD (Chronic Obstructive Pulmonary Disease)  Goal: Improved Nutrition Intake  Outcome: No Change     Problem: Adjustment to Illness COPD (Chronic Obstructive Pulmonary Disease)  Goal: Optimal Chronic Illness Coping  Outcome: No Change     Problem: Respiratory Compromise (Pneumonia)  Goal: Effective Oxygenation and Ventilation  Outcome: No Change  Intervention: Optimize Oxygenation and Ventilation  Recent Flowsheet Documentation  Taken 11/5/2021 0131 by Carlee Hernandez, RN  Head of Bed ([facility]) Positioning: HOB at 15 degrees  Taken 11/4/2021 1957 by Carlee Hernandez, RN  Head of Bed ([facility]) Positioning: HOB at 15 degrees   Pt alert  and confused,removed CPAP and refused to put back on , de-sating low 80s,increased oxygen to 4L per NC, sat improved to low 90s  Pt voided in urinal 200 ml and 100 ml this shift and PVR was 0  Npo for EGD,heparin held per HOUse officer.

## 2021-11-05 NOTE — ANESTHESIA CARE TRANSFER NOTE
Patient: Efe Huertas    Procedure: Procedure(s):  ESOPHAGOGASTRODUODENOSCOPY  SIGMOIDOSCOPY, FLEXIBLE       Diagnosis: Proctitis [K62.89]  Dysphagia, unspecified type [R13.10]  Diagnosis Additional Information: No value filed.    Anesthesia Type:   MAC     Note:    Oropharynx: oropharynx clear of all foreign objects  Level of Consciousness: awake  Oxygen Supplementation: nasal cannula  Level of Supplemental Oxygen (L/min / FiO2): 6  Independent Airway: airway patency satisfactory and stable  Dentition: dentition unchanged  Vital Signs Stable: post-procedure vital signs reviewed and stable  Report to RN Given: handoff report given  Patient transferred to: Medical/Surgical Unit  Comments: Pt transferred back to unit. Report given to RN via phone.  Spontaneously breathing with sufficient gas exchange. On 6L O2 via NC. VSS.   GREY Lira CRNA   Handoff Report: Identifed the Patient, Identified the Reponsible Provider, Reviewed the pertinent medical history, Discussed the surgical course, Reviewed Intra-OP anesthesia mangement and issues during anesthesia, Set expectations for post-procedure period and Allowed opportunity for questions and acknowledgement of understanding      Vitals:  Vitals Value Taken Time   /62 11/05/21 1132   Temp 36.2  C (97.2  F) 11/05/21 1132   Pulse 70 11/05/21 1132   Resp 12 11/05/21 1132   SpO2 97 % 11/05/21 1132       Electronically Signed By: GREY Holcomb CRNA  November 5, 2021  11:33 AM

## 2021-11-05 NOTE — TREATMENT PLAN
[facility]AT Treatment Plan    Patient Score: 21  Patient Acuity: 1    Clinical Indication for Therapy: rhonchi on auscultation and unable to cough spontaneously    Therapy Ordered: Duoneb 4x daily. Albuterol Q4H PRN  And Flutter QID.    Assessment Summary: Continue the current therapy based on Breath sounds Diminished all lung, MARGARET coarse, crackles. lethargic  from procedure due to   Anesthesia.      Fanny Cardozo, RT  11/5/2021

## 2021-11-05 NOTE — PROGRESS NOTES
Patient declining overnight CPAP at this time. Hospital auto unit in room on standby with 2L bleed. Instructed to call if patient wants to use.     Kacey Templeton, RT

## 2021-11-06 NOTE — PROGRESS NOTES
RESPIRATORY CARE NOTE   Patient is on 4L NC, BS diminished, gave duoneb treatment x1, BS post treatment no change, patient perceives no improvement, patient tolerated well  Patient was coached on the flutter valve and gave a good effert.     Kenneth H. Kjellberg

## 2021-11-06 NOTE — PROGRESS NOTES
Daily Progress Note    Addendum;  Patient had elevated WBC count which thought to be leukemoid reaction per oncologist as patient has history of  Clemente 2+ myeloproliferative disorder.  Due to elevated WBC count sepsis BPA fired and lactic acid was checked by RN which came back 2.5.  Overall patient's clinical condition is improving, respiratory status improving and already on antibiotics for possible pneumonia though procalcitonin negative, BC no growth, UC no growth and bilateral thoracocentesis fluid analysis so far no growth.  Follow-up chest x-ray reported improvement.  Patient did received aggressive diuretics for CHF exacerbation and creatinine trending up, likely patient is more dry/dehydration contributing mild lactic acidosis, discontinue IV Lasix, give 200 mL normal saline bolus then start gentle hydration with normal saline 50ml/hr. Continue clinical/vitals monitoring.  Discussed with nephrologist.  Discussed with patient's nurse about plan of care, nurse reported patient not voiding today, recommended bladder protocol..     CODE STATUS:  No CPR- Pre-arrest intubation OK    11/02/21  Assessment/Plan:  Efe Huertas is a 89 year old old male with past medical history of atrial fibrillation, COPD on 2 L nasal cannula, JANETH on CPAP, CHF, myeloproliferative disorder who presented to ED for evaluation of generalized malaise, fatigue, poor appetite and admitted for further management.    Acute on chronic respiratory failure with hypoxia; likely multifactorial-possible pneumonia, acute CHF with preserved EF, COPD exacerbation, pleural effusion, physical deconditioning.    Patient with history of COPD on 2-3 L nasal cannula at home and JANETH on CPAP but noncompliant  --On admission, CT  A/P reported bilateral atelectasis or infiltrate and small pleural effusions.  --On 11/1, CXR reported findings consistent with CHF  --On admission normal procalcitonin and normal lactic acid.  --On admission, elevated WBC count.    --On 11/1 night required BiPAP for worsening respiratory status.  --S/p right thoracocentesis on 11/2 and left thoracocentesis on 11/3 with removal of 900 cc from each side  --Patient initiated on DuoNeb, titrate.  IV Solu-Medrol changed to oral prednisone on 11/4, completed course.   --On Levaquin, continue for total of 5 to 7 days per pulmonary.  Of note, normal procalcitonin  --Received IV Lasix 40 mg every 6 hour and also dose of metolazone on 11/3 per cardiology, now creatinine trending up, Lasix discontinued.  --Appreciated cardiology and pulmonary input.  Discussed with pulmonologist, Dr Christina reported pleural fluid analysis so far fairly unremarkable and can follow-up with him as an outpatient and signed off.    Dysphagia, likely due to stricture;  Possible rectal wall thickening on CT, possible proctitis.  However, no complaint of abdominal pain.  --On 11/5 EGD reportedly benign-appearing esophageal stenosis status post dilatation, recommended omeprazole 20 mg daily indefinitely.  Also esophageal ulcer with no stigmata of recent bleeding.  Biopsy pending, defer to GI  --On 11/5, flexible sigmoidoscopy unremarkable  --Appreciated GI input.  --Discussed with gastroenterologist today, reported plan for EGD and flex sigmoidoscopy tomorrow.  Discussed risk of respiratory complication post procedure.     Myeloproliferative disorder;  Microcytic anemia;  Leukocytosis; worsening  --Appreciated oncology input, reported MDS versus myelofibrosis, may need bone marrow biopsy to clarify in future.  --WBC elevated on admission, seems elevated since 5/2021.  However, now significantly worsening which seems correlating with initiation of steroid.  Of note, patient afebrile and clinically improving, UC no growth, BC no growth, procalcitonin negative.  Per pulmonary, pleural fluid analysis fairly unremarkable for infection.  Respiratory panel PCR negative.  --On 11/5, discussed with oncology team, they increased  hydroxyurea from 500 to 1000 mg.      Acute kidney injury; ? Cause.  CHF exacerbation versus overdiuresis  --Home Celebrex on hold  --Admission CT abdomen/pelvis negative for hydronephrosis  --On admission, received IV fluid with improvement on creatinine but concern for CHF and initiated on IV diuretics, creatinine trending up.  Did received a schedule IV Lasix and also dose of metolazone on 11/3.  -- Appreciated nephrology input.  Monitor labs, intake/output, weight closely    H/o Afib;  --has ICD in place  --not anticoagulated due to h/o multiple falls in the past per cardiology note.     Nonsustained ventricular tachycardia on device check per cardiology;  --Appreciated cardiology input, reported no ischemic evaluation at this time, also reported previously weaned off beta-blocker due to lightheadedness and recommended continue to hold at this time.    Severe protein malnutrition per RD;  --Continue supplements.    DVT prophylaxis; subcu heparin      Disposition; anticipate TCU  Barrier to discharge; clinical progress, lab monitoring     LOS: 2 days     Subjective:  Interval History: Patient seen and examined. Notes, labs, imaging reports personally reviewed.  Patient refused CPAP last night.  Discussed with patient's daughter at bedside, reported patient was noncompliance at home too and reported due to mask making noise.  Patient was sleepy but following commands, hard of hearing.  Patient had breakfast and daughter reported was alert.  Discussed with RT and advised to place in a CPAP when taking naps during the day today as patient did not use CPAP for last 2 nights.  Discussed with nursing staffs.   Weekly Covid test popped up, order placed.  Again seen around late morning, placed on a CPAP as patient taking naps.  Discussed with patient's daughter again.  Discussed with respiratory therapist multiple times and requested to update MD if any respiratory status change.    Review of Systems:   As mentioned in  subjective.    Patient Active Problem List   Diagnosis     A-fib (H)     NICM (nonischemic cardiomyopathy) (H)     CHB (complete heart block) (H)     Colovesical fistula     Congestive heart failure (H)     Encounter for servicing of implantable cardioverter-defibrillator (ICD) at end of battery life     Biventricular ICD (implantable cardioverter-defibrillator) in place     Obstructive airway disease (H)     Dizziness     Pre-syncope     Ventricular bigeminy     Constipation     Pneumonia of both lungs due to infectious organism, unspecified part of lung     Chronic respiratory failure (H)     COPD, group B, 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 (H)     Episodic mood disorder (H)     Left foot drop     Mobitz type II atrioventricular block     Leukocytosis, unspecified type     JERED (acute kidney injury) (H)     Proctitis     Myeloproliferative disorder (H)     Splenomegaly     Prostate cancer (H)     Polycythemia     JANETH (obstructive sleep apnea)     Acute on chronic respiratory failure with hypoxia (H)       Scheduled Meds:    furosemide  40 mg Intravenous Daily     heparin ANTICOAGULANT  5,000 Units Subcutaneous Q12H     hydroxyurea  1,000 mg Oral Daily     ipratropium - albuterol 0.5 mg/2.5 mg/3 mL  3 mL Nebulization 4x daily     lactobacillus rhamnosus (GG)  1 capsule Oral BID     levofloxacin  250 mg Intravenous Q24H     mirtazapine  3.75 mg Oral At Bedtime     multivitamin, therapeutic  1 tablet Oral Daily     pantoprazole  40 mg Oral QAM AC     polyethylene glycol  17 g Oral Daily     senna-docusate  1 tablet Oral Daily     Continuous Infusions:    PRN Meds:.acetaminophen, albuterol, alum & mag hydroxide-simethicone, benzonatate, bisacodyl, bisacodyl, magnesium hydroxide, melatonin, naloxone, naloxone, naloxone, naloxone, ondansetron, senna-docusate, [COMPLETED] sodium phosphate **FOLLOWED BY** sodium phosphate, sodium phosphate    Objective:  Vital signs in last 24 hours:  Temp:  [96.4  F (35.8  C)-98.3  F (36.8  C)]  97.2  F (36.2  C)  Pulse:  [70-74] 71  Resp:  [12-24] 22  BP: (100-164)/(52-71) 100/58  SpO2:  [90 %-97 %] 97 %      Intake/Output Summary (Last 24 hours) at 11/2/2021 0810  Last data filed at 11/1/2021 1800  Gross per 24 hour   Intake 140 ml   Output 400 ml   Net -260 ml       Physical Exam:  General: Not in obvious distress.  HEENT: Normocephalic, normocephalic  Chest: Decreased but clear to auscultation bilateral anteriorly, no wheezing, unlabored breathing  Heart: S1S2 normal, regular  Abdomen: Soft. NT, ND. Bowel sounds- active.  Extremities: No legs swelling  Neuro: Sleeping, easily arousable, follows simple commands, hard of hearing, moves all extremities    Lab Results:(I have personally reviewed the results)    Recent Results (from the past 24 hour(s))   UPPER GI ENDOSCOPY    Collection Time: 11/05/21 11:30 AM   Result Value Ref Range    Upper GI Endoscopy       [facility]  [address] [place] [address]  _______________________________________________________________________________  Patient Name: Efe Huertas         Procedure Date: 11/5/2021 11:30 AM  MRN: [id number]                       Account Number: [id number]  YOB: 1928               Admit Type: Inpatient  Age: 89                                Note Status: Finalized  Attending MD: Leighton Tony DO Instrument Name: EGD Scope [id number]  _______________________________________________________________________________     Procedure:             Upper GI endoscopy  Indications:           Dysphagia  Providers:             Leighton Tony DO  Referring MD:            Medicines:             Monitored Anesthesia Care  Complications:         No immediate complications.  _______________________________________________________________________________  Procedure:             Pre-Anesthesia Assessment:                         - Prior to  the procedure, a History and Physical was                           performed, and patient medications and allergies were                          reviewed. The patient is competent. The risks and                          benefits of the procedure and the sedation options and                          risks were discussed with the patient. All questions                          were answered and informed consent was obtained.                          Patient identification and proposed procedure were                          verified by the physician, the nurse and the                          anesthetist in the procedure room. Mental Status                          Examination: alert and oriented. Airway Examination:                          normal oropharyngeal airway and neck mobility.                          Respiratory Examination: clear to auscultation. CV                          Examination: normal. Prophylactic Antibiotics: The                          patient does not require  prophylactic antibiotics.                          Prior Anticoagulants: The patient has taken no                          anticoagulant or antiplatelet agents. ASA Grade                          Assessment: III - A patient with severe systemic                          disease. After reviewing the risks and benefits, the                          patient was deemed in satisfactory condition to                          undergo the procedure. The anesthesia plan was to use                          monitored anesthesia care (MAC). Immediately prior to                          administration of medications, the patient was                          re-assessed for adequacy to receive sedatives. The                          heart rate, respiratory rate, oxygen saturations,                          blood pressure, adequacy of pulmonary ventilation, and                          response to care were monitored throughout the                          procedure. The physical status of the patient  was                           re-assessed after the procedure.                         After obtaining informed consent, the endoscope was                          passed under direct vision. Throughout the procedure,                          the patient's blood pressure, pulse, and oxygen                          saturations were monitored continuously. The endoscope                          was introduced through the mouth, and advanced to the                          third part of duodenum. The upper GI endoscopy was                          accomplished without difficulty. The patient tolerated                          the procedure well. (There was a problem with the                          provation software so pictures were not able to be                          captured for this study)                                                                                   Findings:       Two superficial esophageal ulcers with no stigmata of recent bleeding        were found 30 to 35  cm from the incisors. These were in the suspected        area of the aortic arch. There was ulcerated tissue on both sides of the        esophagus. These could have been from pill esophagitis. Biopsies were        taken with a cold forceps for histology. Verification of patient        identification for the specimen was done. Estimated blood loss was        minimal.       One benign-appearing, intrinsic stenosis (schatzki was found 44 cm from        the incisors. This stenosis measured 1.4 cm (inner diameter) x less than        one cm (in length). The stenosis was traversed. A TTS dilator was passed        through the scope. Dilation with a 15 mm balloon dilator was performed.        This was biopsied with a cold forceps for distrupt the schatzki ring.       The entire examined stomach was normal.       The examined duodenum was normal.                                                                                   Moderate Sedation:        Moderate (conscious) sedation was personall y administered by an        anesthesia professional. The following parameters were monitored: oxygen        saturation, heart rate, blood pressure, and response to care.  Impression:            - Esophageal ulcers with no stigmata of recent                          bleeding. Biopsied.                         - Benign-appearing esophageal stenosis. Dilated.                          Biopsied.                         - Normal stomach.                         - Normal examined duodenum.  Recommendation:        - Await pathology results.                         - Use Prilosec (omeprazole) 20 mg PO daily                          indefinitely.                         - Repeat upper endoscopy PRN for repeat dilatation.                         - Proceed with the flexible sigmoidoscopy as planned.                         - Advance diet as tolerated.                                                                                     Leighton Tony MD  __________________________  Leighton desouza DO  11/5/2021 11:44:54 AM  I was physically present for the entire viewing portion of the exam.  __________________________  Signature of teaching physician  Michael/Oanh Tony DO  Number of Addenda: 0    Note Initiated On: 11/5/2021 11:30 AM     FLEXIBLE SIGMOIDOSCOPY    Collection Time: 11/05/21 11:45 AM   Result Value Ref Range    [facility]  [address] [place] [address]  _______________________________________________________________________________  Patient Name: Efe Huertas         Procedure Date: 11/5/2021 11:45 AM  MRN: [id number]                       Account Number: [id number]  YOB: 1928               Admit Type: Inpatient  Age: 89                                Note Status: Finalized  Attending MD: Leighton Tony DO Instrument Name: EGD Scope  1129  _______________________________________________________________________________     Procedure:             Flexible Sigmoidoscopy  Indications:           Abnormal CT of the GI tract, (Possible rectal wall                          thickening)  Providers:             DO Giovanni Dale MD:            Medicines:             Monitored Anesthesia Care  Complications:         No immediate complications.  _______________________________________________________________________________  Pro cedure:             Pre-Anesthesia Assessment:                         - Prior to the procedure, a History and Physical was                          performed, and patient medications and allergies were                          reviewed. The patient is competent. The risks and                          benefits of the procedure and the sedation options and                          risks were discussed with the patient. All questions                          were answered and informed consent was obtained.                          Patient identification and proposed procedure were                          verified by the physician, the nurse and the                          anesthetist in the procedure room. Mental Status                          Examination: alert and oriented. Airway Examination:                          normal oropharyngeal airway and neck mobility.                          Respiratory Examination: clear to auscultation. CV                          Examination: normal . Prophylactic Antibiotics: The                          patient does not require prophylactic antibiotics.                          Prior Anticoagulants: The patient has taken no                          anticoagulant or antiplatelet agents. ASA Grade                          Assessment: III - A patient with severe systemic                          disease. After reviewing the risks and benefits, the                           patient was deemed in satisfactory condition to                          undergo the procedure. The anesthesia plan was to use                          monitored anesthesia care (MAC). Immediately prior to                          administration of medications, the patient was                          re-assessed for adequacy to receive sedatives. The                          heart rate, respiratory rate, oxygen saturations,                          blood pressure, adequacy of pulmonary ventilation, and                          response to care were monitored thro ughout the                          procedure. The physical status of the patient was                          re-assessed after the procedure.                         After obtaining informed consent, the scope was passed                          under direct vision. The endoscope was introduced                          through the anus and advanced to the descending colon.                          The flexible sigmoidoscopy was accomplished without                          difficulty. The patient tolerated the procedure well.                          The quality of the bowel preparation was poor. (Images                          were not able to be captured during this study due to                          a problem with the provation software)                                                                                   Findings:       The perianal and digital rectal examinations were normal.       The entire examined colon appeared normal. Although there was a large        amou nt of brown liquid stool seen throughout the colon, aggressive        lavage allowed me to see large portions of the colon mucosa and this all        appeared normal. No evidence of inflammation or large polyps.                                                                                   Moderate Sedation:       Moderate (conscious) sedation was personally administered  by an        anesthesia professional. The following parameters were monitored: oxygen        saturation, heart rate, blood pressure, and response to care.  Impression:            - Preparation of the colon was poor.                         - The entire examined colon is normal.                         - No specimens collected.  Recommendation:        - Advance diet as tolerated.                         - No further endoscopic evaluation is necessary for                          this.                         - GI will now sign off. Please page with additional                          questions or concerns.                                                                                      Leighton Tony MD  __________________________  Leighton Tony DO  11/5/2021 11:49:46 AM  I was physically present for the entire viewing portion of the exam.  __________________________  Signature of teaching physician  Michael/Oanh Tony DO  Number of Addenda: 0    Note Initiated On: 11/5/2021 11:45 AM     CBC with platelets    Collection Time: 11/06/21  6:10 AM   Result Value Ref Range    WBC Count 60.6 (HH) 4.0 - 11.0 10e3/uL    RBC Count 2.56 (L) 4.40 - 5.90 10e6/uL    Hemoglobin 8.5 (L) 13.3 - 17.7 g/dL    Hematocrit 29.2 (L) 40.0 - 53.0 %     (H) 78 - 100 fL    MCH 33.2 (H) 26.5 - 33.0 pg    MCHC 29.1 (L) 31.5 - 36.5 g/dL    RDW 17.3 (H) 10.0 - 15.0 %    Platelet Count 533 (H) 150 - 450 10e3/uL   Basic metabolic panel    Collection Time: 11/06/21  6:10 AM   Result Value Ref Range    Sodium 143 136 - 145 mmol/L    Potassium 4.3 3.5 - 5.0 mmol/L    Chloride 107 98 - 107 mmol/L    Carbon Dioxide (CO2) 28 22 - 31 mmol/L    Anion Gap 8 5 - 18 mmol/L    Urea Nitrogen 59 (H) 8 - 28 mg/dL    Creatinine 2.22 (H) 0.70 - 1.30 mg/dL    Calcium 9.7 8.5 - 10.5 mg/dL    Glucose 90 70 - 125 mg/dL    GFR Estimate 25 (L) >60 mL/min/1.73m2   Glucose by meter    Collection Time: 11/06/21  6:31 AM   Result Value Ref Range     GLUCOSE BY METER POCT 86 70 - 99 mg/dL         Serum Glucose range:   Recent Labs   Lab 11/06/21  0631 11/06/21  0610 11/05/21  0644 11/04/21  0559   GLC 86 90 113 144*     ABG:   Recent Labs   Lab 11/01/21  2308   PH 7.28*   PCO2 55*   PO2 58*   HCO3 23   O2PER 50     CBC:   Recent Labs   Lab 11/06/21  0610 11/05/21  0644 11/04/21  1322 11/04/21  0559 11/04/21  0559   WBC 60.6* 62.3* 56.5*   < > 47.6*   HGB 8.5* 8.4* 8.4*   < > 8.0*   HCT 29.2* 28.2* 28.2*   < > 26.6*   * 112* 110*   < > 111*   * 550* 545*   < > 549*   NEUTROPHIL  --   --  98  --  97   LYMPH  --   --  0  --  1   MONOCYTE  --   --  2  --  2   EOSINOPHIL  --   --  0  --  0    < > = values in this interval not displayed.     Chemistry:   Recent Labs   Lab 11/06/21  0610 11/05/21  0644 11/04/21  0559 11/03/21  0553 11/02/21  1641 11/02/21  0527 11/01/21  0808 11/01/21  0808    140 142   < >  --  144   < > 143   POTASSIUM 4.3 4.6 4.6   < >  --  5.2*   < > 4.9   CHLORIDE 107 106 106   < >  --  109*   < > 109*   CO2 28 25 27   < >  --  27   < > 27   BUN 59* 51* 43*   < >  --  28   < > 28   CR 2.22* 1.96* 1.85*   < >  --  1.51*   < > 1.28   GFRESTIMATED 25* 29* 31*   < >  --  39*   < > 48*   CIRA 9.7 9.7 9.8   < >  --  8.9   < > 9.2   PROTTOTAL  --   --   --   --  5.9* 5.4*  --  5.7*   ALBUMIN  --  3.2* 3.2*  --   --  3.2*   < > 3.4*   AST  --   --   --   --   --  21  --  21   ALT  --   --   --   --   --  <9  --  <9   ALKPHOS  --   --   --   --   --  142*  --  146*   BILITOTAL  --   --   --   --   --  0.5  --  0.5    < > = values in this interval not displayed.     Coags:  No results for input(s): INR, PROTIME, PTT in the last 168 hours.    Invalid input(s): APTT  Cardiac Markers:  No results for input(s): CKTOTAL, TROPONINI in the last 168 hours.     Chest XR,  PA & LAT    Result Date: 10/30/2021  EXAM: XR CHEST 2 VW LOCATION: [facility] DATE/TIME: 10/30/2021 12:01 AM INDICATION: fatigue COMPARISON: 08/12/2019      IMPRESSION: Heart size prominent on this AP view but unchanged. Pulmonary vascularity normal. Subsegmental atelectasis or scarring in the bases. Small bilateral pleural effusions posteriorly in the costophrenic angles. Upper lung fields are clear. Pacemaker lead tips right atrium and right ventricle. Clips upper abdomen.    Echocardiogram Complete    Result Date: 10/30/2021  [id number]^SHAYY^KAIDEN^[place] [address]  Name: GEE OGDEN MRN: [id number] : 1928 Study Date: 10/30/2021 10:42 AM Age: 89 yrs Gender: Male Patient Location: [facility] Reason For Study: Chest Discomfort Ordering Physician: KAIDEN LUCAS Performed By: JUANA  BSA: 2.2 m2 Height: 74 in Weight: 203 lb HR: 78 ______________________________________________________________________________ Procedure Definity ([facility]C #57003-498) given intravenously. ______________________________________________________________________________ Interpretation Summary  Definity contrast utilized The left ventricle is mildly dilated. There is mild to moderate concentric left ventricular hypertrophy. LV systolic function appears lower limits of normal The visual ejection fraction is 50-55%. Septal wall motion abnormality may reflect pacemaker activation. TAPSE is normal, which is consistent with normal right ventricular systolic function. Pacemaker lead in the right heart There is mild (1+) tricuspid regurgitation. Estimate of RV systolic pressure is elevated at 54mmhg plus right atrial pressure Mild to moderate valvular aortic stenosis.Peak velocity 2.6 m/s,mean gradient 14mmhg.2D visualization of the aortic valve suggests the potential for more significant aortic stenosis then calculated by doppler examination There is mild (1+) aortic regurgitation. The aortic root is moderately dilated.Measures approxmately 4.6cm Mild to moderate enlargement of the proximal ascending aorta measures  approximately 4.4cm Consider CT,GABE or MRI (if pacer compatible) to further define the aortic valve and thoracic aorta No prior study available for comparison ______________________________________________________________________________ Left Ventricle The left ventricle is mildly dilated. There is mild to moderate concentric left ventricular hypertrophy. Diastolic Doppler findings (E/E' ratio and/or other parameters) suggest left ventricular filling pressures are normal. The visual ejection fraction is 50-55%. LV systolic function appears lower limits of normal. Septal wall motion abnormality may reflect pacemaker activation.  Right Ventricle The right ventricle is not well visualized. The right ventricle is mildly dilated. TAPSE is normal, which is consistent with normal right ventricular systolic function. The right ventricular systolic function is normal. There is a pacemaker lead in the right ventricle.  Atria The left atrium is mild to moderately dilated. Pacer wire in right atrium. Right atrium not well visualized. The right atrium is mildly dilated.  Mitral Valve The mitral valve leaflets appear thickened, but open well. There is moderate mitral annular calcification. There is mild (1+) mitral regurgitation.  Tricuspid Valve There is mild (1+) tricuspid regurgitation. Moderate (46-55mmHg) pulmonary hypertension is present. The right ventricular systolic pressure is approximated at 53.5 mmHg plus the right atrial pressure.  Aortic Valve Moderate to severe aortic valve leaflet calcification. There is mild (1+) aortic regurgitation. Mild to moderate valvular aortic stenosis.  Pulmonic Valve There is mild (1+) pulmonic valvular regurgitation.  Vessels The aortic root is moderately dilated.Measures approxmately 4.6cm. Mild to moderate enlargement of the proximal ascending aorta measures approximately 4.4cm. IVC diameter <2.1 cm collapsing >50% with sniff suggests a normal RA pressure of 3 mmHg.  Pericardium  Trivial pericardial effusion.  ______________________________________________________________________________ MMode/2D Measurements & Calculations IVSd: 1.4 cm LVIDd: 6.9 cm LVIDs: 4.0 cm LVPWd: 1.4 cm  FS: 42.1 % LV mass(C)d: 479.8 grams LV mass(C)dI: 219.4 grams/m2 Ao root diam: 4.6 cm LA dimension: 4.6 cm asc Aorta Diam: 4.7 cm LA/Ao: 1.0 LVOT diam: 3.0 cm LVOT area: 7.2 cm2 RWT: 0.40  Time Measurements MM HR: 74.0 BPM  Doppler Measurements & Calculations MV E max indio: 67.3 cm/sec MV A max indio: 90.3 cm/sec MV E/A: 0.75 MV dec slope: 326.6 cm/sec2 MV dec time: 0.21 sec Ao V2 max: 232.4 cm/sec Ao max P.0 mmHg Ao V2 mean: 177.2 cm/sec Ao mean P.3 mmHg Ao V2 VTI: 54.3 cm DEEPIKA(I,D): 2.5 cm2 DEEPIKA(V,D): 3.2 cm2 AI P1/2t: 518.4 msec LV V1 max P.2 mmHg LV V1 max: 102.3 cm/sec LV V1 VTI: 19.1 cm  SV(LVOT): 137.7 ml SI(LVOT): 63.0 ml/m2 PA acc time: 0.07 sec PI end-d indio: 132.7 cm/sec TR max indio: 365.9 cm/sec TR max P.5 mmHg AV Indio Ratio (DI): 0.44 DEEPIKA Index (cm2/m2): 1.2 E/E' av.9 Lateral E/e': 6.7 Medial E/e': 11.0  ______________________________________________________________________________ Report approved by: Trev Jason 10/30/2021 12:53 PM       XR Chest Port 1 View    Result Date: 2021  EXAM: XR CHEST PORT 1 VIEW LOCATION: [facility] DATE/TIME: 2021 11:10 AM INDICATION: persistent hypoxia COMPARISON: 10/29/2021     IMPRESSION: Stable position multilead left subclavian pacemaker. Stable heart size. There is cephalization of blood flow, and small pleural effusions, increased from previous. These findings suggest CHF. Also, there is new left mid to inferior chest opacity which could represent asymmetric pulmonary edema, or pneumonia.    CT Abdomen Pelvis w Contrast  Result Date: 10/30/2021  IMPRESSION: 1.  Bibasilar atelectasis or infiltrate and small pleural effusions. 2.  Splenomegaly. 3.  The wall thickening not excluded. Correlate for proctitis. 4.   Diverticulosis. 5.  Remainder similar to previous.    CT Head w/o Contrast  Result Date: 10/31/2021  IMPRESSION: 1.  No CT evidence for acute intracranial process. 2.  Brain atrophy and presumed chronic microvascular ischemic changes as above. 3.  No change.      Latest radiology report personally reviewed.    Note created using dragon voice recognition software so sounds alike errors may have escaped editing.    11/06/2021   SADE RAMSEY MD  HOSPITALIST, [facility]  PAGER NO. [phone]

## 2021-11-06 NOTE — PLAN OF CARE
Problem: Adult Inpatient Plan of Care  Goal: Plan of Care Review  Outcome: No Change   Patient slept well over night. He had no complaints of pain. O2 sats in the mid 90's on 5L oxymask. He refused his CPAP over night. Repositioned q 3 hours in bed.

## 2021-11-06 NOTE — PROGRESS NOTES
RESPIRATORY CARE NOTE   Patient is on 4L oxymask, BS diminished, gave duoneb treatment x1, BS post treatment diminished, patient perceives no improvement, patient tolerated well.     Kenneth H. Kjellberg

## 2021-11-06 NOTE — PLAN OF CARE
Problem: Adult Inpatient Plan of Care  Goal: Patient-Specific Goal (Individualized)  Outcome: Improving   Daughter at bedside feeding patient.     Problem: Fluid Imbalance (Pneumonia)  Goal: Fluid Balance  Outcome: Improving   IV fluids running, oral fluids encouraged. BP improved.  Last straight cath'd at 1515, will pass on to oncoming shift to monitor output or bladder scan at 1915 and continue to monitor.

## 2021-11-06 NOTE — PROGRESS NOTES
RESPIRATORY CARE NOTE   Patient is on 4L nasal canula, BS diminished, gave duoneb treatment x1, BS post treatment no change, patient perceives no improvement, patient tolerated well. Patient was on cpap with 12 L O2 bleed in when I arrived, woke patient up gave treatment and placed him on 4L nasal cannula. .     Kenneth H. Kjellberg

## 2021-11-06 NOTE — PROGRESS NOTES
RENAL PROGRESS NOTE     CC: Acute kidney injury    ROS: I was called by Hospitalist to see him.  Dtr here now. Had had HoTN today, getting a fluid bolus. Not eating or drinking much, has restrictions and swallowing issues.  Patient is very somnolent,  not able to participate in a review of systems, he is resting in bed.    Assessment and Plan:       Acute kidney injury - Acute Tubular Necrosis.  Acute tubular necrosis secondary to infection vs. Intravascular depletion from diuresis of edema and heart failure.      Baseline creatinine 0.78 (5/19/2021)    Creatinine 1.6 (10/29)-->-->1.51 (11/2)-->1.6 (11/3)-->, 1.85 (11/4)-->1.95 (11/5)---> 2.21    Random urine sodium 66, urine osmolality 403 - C/w Acute Tubular Necrosis (11/4)    Urinalysis (10/30/2021) shows adequate urine specific gravity, albumin 3 mg/dL, no evidence of infection, hyaline casts 6  suggestive of intravascular depletion at that time.    Renal  ultrasound back in 2019 demonstrated normal renal size, will hold off on repeating ultrasound since a King catheter is in place and good urine is present in King bag at present (11/4)    ATN requires supportive care, no dialysis indication.     Got Furosemide 40 mg IV daily. Cr worse, looks dry, HoTN. Holding diuretics, getting a bolus now  2. Acute on chronic respiratory failure with hypoxia.  She has underlying chronic obstructive pulmonary disease on home oxygen, presented with findings consistent with congestive heart failure, respiratory distress has improved with administration of IV furosemide.  He seems to be responding well to IV diuretics.    See comments above, patient likely needs some degree of diuretic support.  Edema still present    Holding diuretics at present  3. Chronic Diastolic heart failure.  Left ventricular ejection fraction 50-55%.  Left ventricular hypertrophy present.  Patient has pulmonary hypertension as well. Very sensitive to Diuretic rx reveals poor GFR  4. Pulmonary  "hypertension.  Secondary to chronic obstructive pulmonary disease with some probable additional contribution from heart failure.  5. 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  Chronic anemia.  Known splenomegaly.  Continue management as per [place] [facility].  6. Disp-Very poor prognosis. Is DNR. I rec Palliative Consult.      d/w dtr  D/w Dr. HEMAL Aguirre MD  [facility]  [phone]    PHYSICAL EXAM  BP (!) 89/53 (BP Location: Right arm)   Pulse 70   Temp 97.9  F (36.6  C) (Oral)   Resp 24   Ht 1.88 m (6' 2\")   Wt 93.7 kg (206 lb 9.6 oz)   SpO2 96%   BMI 26.53 kg/m        Wt Readings from Last 3 Encounters:   11/04/21 93.7 kg (206 lb 9.6 oz)   09/09/19 101.3 kg (223 lb 6.4 oz)   10/11/19 102.8 kg (226 lb 9.6 oz)       GENERAL: Chronically ill and debilitated. elderly  HEAD: Normal  HEENT: No eyelid edema.  CARDIOVASCULAR: No JVD present.  Some sacral dependent edema  PULMONARY: No respiratory distress while receiving oxygen via FM, No cyanosis  GASTROINTESTINAL: No ascites present  MSK: Diffuse muscle wasting, no joint deformities  NEURO: Very somnolent.   SKIN: Pale, no jaundice, no rash.    LABORATORIES  Recent Labs   Lab 11/06/21  0610 11/05/21  0644 11/04/21  1322   WBC 60.6* 62.3* 56.5*   HGB 8.5* 8.4* 8.4*   HCT 29.2* 28.2* 28.2*   * 550* 545*     Recent Labs   Lab 11/06/21  0610 11/05/21  0644 11/04/21  0559 11/03/21  0553 11/02/21  0527 11/01/21  0808 11/01/21  0808    140 142   < > 144   < > 143   CO2 28 25 27   < > 27   < > 27   BUN 59* 51* 43*   < > 28   < > 28   ALKPHOS  --   --   --   --  142*  --  146*   ALT  --   --   --   --  <9  --  <9   AST  --   --   --   --  21  --  21    < > = values in this interval not displayed.       RADIOLOGY REPORTS    ECHOCARDIOGRAM  Cardiac echo 10/30/2021:  Interpretation Summary     Definity contrast utilized  The left ventricle is mildly dilated.  There is mild to moderate " concentric left ventricular hypertrophy.  LV systolic function appears lower limits of normal  The visual ejection fraction is 50-55%.  Septal wall motion abnormality may reflect pacemaker activation.  TAPSE is normal, which is consistent with normal right ventricular systolic  function.  Pacemaker lead in the right heart  There is mild (1+) tricuspid regurgitation.  Estimate of RV systolic pressure is elevated at 54mmhg plus right atrial  pressure  Mild to moderate valvular aortic stenosis.Peak velocity 2.6 m/s,mean gradient  14mmhg.2D visualization of the aortic valve suggests the potential for more  significant aortic stenosis then calculated by doppler examination  There is mild (1+) aortic regurgitation.  The aortic root is moderately dilated.Measures approxmately 4.6cm  Mild to moderate enlargement of the proximal ascending aorta measures  approximately 4.4cm  Consider CT,GABE or MRI (if pacer compatible) to further define the aortic  valve and thoracic aorta  No prior study available for comparison          MEDICATIONS    heparin ANTICOAGULANT  5,000 Units Subcutaneous Q12H     hydroxyurea  1,000 mg Oral Daily     ipratropium - albuterol 0.5 mg/2.5 mg/3 mL  3 mL Nebulization 4x daily     lactobacillus rhamnosus (GG)  1 capsule Oral BID     levofloxacin  250 mg Intravenous Q24H     mirtazapine  3.75 mg Oral At Bedtime     multivitamin, therapeutic  1 tablet Oral Daily     pantoprazole  40 mg Oral QAM AC     polyethylene glycol  17 g Oral Daily     senna-docusate  1 tablet Oral Daily         Above laboratories and medications were personally reviewed during evaluation today.

## 2021-11-07 NOTE — PROGRESS NOTES
Respiratory Care Note     Placed patient hospital provided cpap at night with 4 oxygen bleed in. Patient tolerating ok so far. Will continue to monitor.

## 2021-11-07 NOTE — PROGRESS NOTES
Patient received three treatments of duoneb tolerated them well while maintaining sats of 93-96%. At 1715 patient started to de sat down to mid 80s while on 6L oxymask, placed patient on cpap with 10L O2 bleed in liquicel in place. On cpap patient is resting comfortably with sats of 93-94%. Continue to monitor and give treratments.      Kenneth H. Kjellberg on 11/7/2021 at 5:31 PM

## 2021-11-07 NOTE — SIGNIFICANT EVENT
Significant Event Note    Time of event: 1:03 AM November 7, 2021    Description of event:  Paged by RN for low blood pressures. Briefly, patient is admitted for acute kidney injury, acute on chronic respiratory failure due to COPD and CHF. Pressures 90s/50s, patient asymptomatic. Has switched between fluid resuscitation and IV diuresis during this hospital stay. Was hypotensive earlier in the day and received small bolus of IV fluids.     Plan:  Ordered 250 ml bolus of normal saline, which brought his pressures up to the low 100s over 50s. Continues to be asymptomatic. Will continue to monitor. Want to obviously be careful about fluid resuscitation given his complicated fluid status.    Discussed with: bedside nurse    Carleen Garcia MD

## 2021-11-07 NOTE — PLAN OF CARE
Problem: Adult Inpatient Plan of Care  Goal: Patient-Specific Goal (Individualized)  Outcome: No Change  Pt drowsy this shift, arousable to voice. Has periods of alertness.     Problem: Adult Inpatient Plan of Care  Goal: Optimal Comfort and Wellbeing  Outcome: No Change  Pt c/o pain in coccyx frequently throughout the shift, repositioning utilized.      Problem: Fluid Imbalance (Pneumonia)  Goal: Fluid Balance  Outcome: No Change  IV fluids running, Pt bladder scanned at 1030 for 109mL. Edema noted in extremities. Later had incontinent episode. Will continue to monitor.

## 2021-11-07 NOTE — PROGRESS NOTES
Respiratory Care Note      Placed patient hospital provided cpap at night with 4 oxygen bleed in. Patient tolerating couple hours and requested to be taken off. NEB given as scheduled. Will continue to monitor as planned.

## 2021-11-07 NOTE — SIGNIFICANT EVENT
Patient remains lethargic but alert. BP soft at 92/51 with a MAP of 65. Patient asymptomatic. House officer notified and 250ml bolus of normal saline given as ordered. He also has been having low urine output. He voided 100ml and then bladder scanned with no residual. BP improved after bolus infused.

## 2021-11-07 NOTE — PLAN OF CARE
Problem: Adult Inpatient Plan of Care  Goal: Plan of Care Review  Outcome: Improving  Goal: Patient-Specific Goal (Individualized)  Outcome: Improving  Goal: Absence of Hospital-Acquired Illness or Injury  Outcome: Improving  Intervention: Identify and Manage Fall Risk  Recent Flowsheet Documentation  Taken 11/6/2021 2000 by Rosa Mckeon RN  Safety Promotion/Fall Prevention:   room door open   room organization consistent  Intervention: Prevent Skin Injury  Recent Flowsheet Documentation  Taken 11/6/2021 1935 by Rosa Mckeon RN  Body Position:   turned   left  Goal: Optimal Comfort and Wellbeing  Outcome: Improving  Goal: Readiness for Transition of Care  Outcome: Improving     Problem: Gas Exchange Impaired  Goal: Optimal Gas Exchange  Outcome: Improving  Intervention: Optimize Oxygenation and Ventilation  Recent Flowsheet Documentation  Taken 11/6/2021 1935 by Rosa Mckeon RN  Head of Bed (HOB) Positioning: HOB at 20-30 degrees     Problem: Oral Intake Inadequate COPD (Chronic Obstructive Pulmonary Disease)  Goal: Improved Nutrition Intake  Outcome: Improving     Problem: Risk for Delirium  Goal: Optimal Coping  Outcome: Improving  Goal: Improved Behavioral Control  Outcome: Improving  Goal: Improved Attention and Thought Clarity  Outcome: Improving  Goal: Improved Sleep  Outcome: Improving     Problem: OT General Care Plan  Goal: Transfer (OT)  Description: Transfer (OT)  Outcome: Improving     Problem: Adjustment to Illness COPD (Chronic Obstructive Pulmonary Disease)  Goal: Optimal Chronic Illness Coping  Outcome: Improving     Problem: Functional Ability Impaired COPD (Chronic Obstructive Pulmonary Disease)  Goal: Optimal Level of Functional Lea  Outcome: Improving     Problem: Infection COPD (Chronic Obstructive Pulmonary Disease)  Goal: Absence of Infection Signs and Symptoms  Outcome: Improving     Problem: Respiratory Compromise COPD (Chronic Obstructive Pulmonary Disease)  Goal:  Effective Oxygenation and Ventilation  Outcome: Improving  Intervention: Optimize Oxygenation and Ventilation  Recent Flowsheet Documentation  Taken 11/6/2021 1935 by Rosa Mckeon RN  Head of Bed (HOB) Positioning: HOB at 20-30 degrees     Problem: Fluid Imbalance (Pneumonia)  Goal: Fluid Balance  Outcome: Improving     Problem: Infection (Pneumonia)  Goal: Resolution of Infection Signs and Symptoms  Outcome: Improving     Problem: Respiratory Compromise (Pneumonia)  Goal: Effective Oxygenation and Ventilation  Outcome: Improving  Intervention: Optimize Oxygenation and Ventilation  Recent Flowsheet Documentation  Taken 11/6/2021 1935 by Rosa Mckeon RN  Head of Bed (HOB) Positioning: HOB at 20-30 degrees     Problem: Obstructive Sleep Apnea Risk or Actual (Comorbidity Management)  Goal: Unobstructed Breathing During Sleep  Outcome: Improving   Pt denied pain this evening,requested for PRN melatonin at bedtime.pt bladder scanned for 100 after wet brief change.Bps soft will continue to monitor.

## 2021-11-07 NOTE — PLAN OF CARE
Problem: Adult Inpatient Plan of Care  Goal: Plan of Care Review  Outcome: No Change   Patient again refused to wear the CPAP over night. He was reproached and encouraged multiple times during the night but refused. His BP's were soft but improved after a small fluid bolus. He continues to have low urine output. Monitoring I/O closely along with q 4 hour bladder scanning. He has been turned in bed q 2-3 hours. His O2 needs have fluctuated between 4-5 L. When he turns in bed his sats drop into the upper 80's and requires increased oxygen and is slow to recover. He was more alert tonight this last night.

## 2021-11-07 NOTE — PROGRESS NOTES
3:06 PM    [facility] left voice mails for pending TCU referrals.    Discussed updates with MD who states Pt is considering getting vaccinated, [facility] states that some facilities are still requiring 2 week quarantine after vaccination, however more options will be available if Pt does decide to be vaccinated.     TRISTA Bernstein

## 2021-11-07 NOTE — PROGRESS NOTES
Daily Progress Note    CODE STATUS:  No CPR- Pre-arrest intubation OK    11/02/21  Assessment/Plan:  Efe Huertas is a 89 year old old male with past medical history of atrial fibrillation, COPD on 2 L nasal cannula, JANETH on CPAP, CHF, myeloproliferative disorder who presented to ED for evaluation of generalized malaise, fatigue, poor appetite and admitted for further management.    Acute on chronic respiratory failure with hypoxia; likely multifactorial-possible pneumonia, acute CHF with preserved EF, COPD exacerbation, pleural effusion, physical deconditioning.    Patient with history of COPD on 2-3 L nasal cannula at home and JANETH on CPAP but noncompliant  --On admission, CT  A/P reported bilateral atelectasis or infiltrate and small pleural effusions.  --On 11/1, CXR reported findings consistent with CHF  --On admission normal procalcitonin and normal lactic acid.  --On admission, elevated WBC count.   --On 11/1 night required BiPAP for worsening respiratory status.  --S/p right thoracocentesis on 11/2 and left thoracocentesis on 11/3 with removal of 900 cc from each side  --Patient initiated on DuoNeb, titrate.  IV Solu-Medrol changed to oral prednisone on 11/4, completed course.   --Completed 7 days course of Levaquin on 11/6.  Of note, normal procalcitonin  --Received IV Lasix 40 mg every 6 hour and also dose of metolazone on 11/3 per cardiology, now creatinine trending up, Lasix discontinued.  --Appreciated cardiology and pulmonary input.  Discussed with pulmonologist, Dr Christina reported pleural fluid analysis so far fairly unremarkable and can follow-up with him as an outpatient and signed off.    Dysphagia, likely due to stricture;  Possible rectal wall thickening on CT, possible proctitis.  However, no complaint of abdominal pain.  --On 11/5 EGD reportedly benign-appearing esophageal stenosis status post dilatation, recommended omeprazole 20 mg daily indefinitely.  Also esophageal ulcer with no stigmata  of recent bleeding.  Biopsy pending, defer to GI  --On 11/5, flexible sigmoidoscopy unremarkable  --Appreciated GI input and they signed off.    Myeloproliferative disorder;  Microcytic anemia;  Leukocytosis; worsening-now trending down on 11/7  --Appreciated oncology input, reported MDS versus myelofibrosis, may need bone marrow biopsy to clarify in future.  --WBC elevated on admission, seems elevated since 5/2021.  However, now significantly worsening which seems correlating with initiation of steroid.  Of note, patient afebrile and clinically improving, UC no growth, BC no growth, procalcitonin negative.  Per pulmonary, pleural fluid analysis fairly unremarkable for infection.  Respiratory panel PCR negative.  --On 11/5, discussed with oncology team, they increased hydroxyurea from 500 to 1000 mg.  WBC count trending down, monitor    Acute kidney injury; ? Cause.  CHF exacerbation versus overdiuresis  --Home Celebrex on hold  --Admission CT abdomen/pelvis negative for hydronephrosis  --On admission, received IV fluid with improvement on creatinine but concern for CHF and initiated on IV diuretics, creatinine trending up.  Did received a schedule IV Lasix and also dose of metolazone on 11/3.  IV diuretics discontinued on 11/6 after discussing with nephrologist.  Patient did received around total of 500 mL IV fluid bolus and currently on D5 RL IV fluid 75 mL/h.  Defer further management to nephrology  --Creatinine is still trending up.  Monitor labs, intake/output, weight closely    H/o Afib;  --has ICD in place  --not anticoagulated due to h/o multiple falls in the past per cardiology note.     Nonsustained ventricular tachycardia on device check per cardiology;  --Appreciated cardiology input, reported no ischemic evaluation at this time, also reported previously weaned off beta-blocker due to lightheadedness and recommended continue to hold at this time.    Severe protein malnutrition per RD;  --Continue  supplements.    DVT prophylaxis; subcu heparin      Disposition; anticipate TCU  Barrier to discharge; clinical progress, lab monitoring     LOS: 2 days     Subjective:  Interval History: Patient seen and examined. Notes, labs, imaging reports personally reviewed.  Patient used CPAP for couple of hours overnight then refused.  This morning on 4 to 5 L oxygen mask (patient seems mouth breather) and saturation in higher 90s on continuous oximetry.  Patient follows simple commands asked how he is feeling, nodded his head saying better.  Discussed with patient's daughter at bedside in detail about ongoing medical issues, worsening creatinine, high WBC count though trending down and oncology recommendation and also mentioned about patient and her brother bone marrow biopsy recommendation and also they discussed about possible finding of malignancy which could include chemotherapy and both not wanting to do treatment if malignancy is identified.  Patient's daughter also reports not wanting to do aggressive treatment.  I did requested patient's daughter to have communication with other family members given patient's multiple comorbidities and his age.  Discussed with nursing staffs.  Discussed with respiratory therapist    Review of Systems:   As mentioned in subjective.    Patient Active Problem List   Diagnosis     A-fib (H)     NICM (nonischemic cardiomyopathy) (H)     CHB (complete heart block) (H)     Colovesical fistula     Congestive heart failure (H)     Encounter for servicing of implantable cardioverter-defibrillator (ICD) at end of battery life     Biventricular ICD (implantable cardioverter-defibrillator) in place     Obstructive airway disease (H)     Dizziness     Pre-syncope     Ventricular bigeminy     Constipation     Pneumonia of both lungs due to infectious organism, unspecified part of lung     Chronic respiratory failure (H)     COPD, group B, 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 (H)     Episodic mood disorder  (H)     Left foot drop     Mobitz type II atrioventricular block     Leukocytosis, unspecified type     JERED (acute kidney injury) (H)     Proctitis     Myeloproliferative disorder (H)     Splenomegaly     Prostate cancer (H)     Polycythemia     JANETH (obstructive sleep apnea)     Acute on chronic respiratory failure with hypoxia (H)       Scheduled Meds:    heparin ANTICOAGULANT  5,000 Units Subcutaneous Q12H     hydroxyurea  1,000 mg Oral Daily     ipratropium - albuterol 0.5 mg/2.5 mg/3 mL  3 mL Nebulization 4x daily     lactobacillus rhamnosus (GG)  1 capsule Oral BID     mirtazapine  3.75 mg Oral At Bedtime     multivitamin, therapeutic  1 tablet Oral Daily     pantoprazole  40 mg Oral QAM AC     polyethylene glycol  17 g Oral Daily     senna-docusate  1 tablet Oral Daily     Continuous Infusions:    dextrose 5% lactated ringers 75 mL/hr at 11/07/21 0627     PRN Meds:.acetaminophen, albuterol, alum & mag hydroxide-simethicone, benzonatate, bisacodyl, bisacodyl, diclofenac, magnesium hydroxide, melatonin, naloxone, naloxone, naloxone, naloxone, ondansetron, senna-docusate, [COMPLETED] sodium phosphate **FOLLOWED BY** sodium phosphate, sodium phosphate    Objective:  Vital signs in last 24 hours:  Temp:  [97.3  F (36.3  C)-98.8  F (37.1  C)] 98.8  F (37.1  C)  Pulse:  [70] 70  Resp:  [18-24] 20  BP: ()/(44-59) 110/59  SpO2:  [90 %-99 %] 95 %      Intake/Output Summary (Last 24 hours) at 11/2/2021 0810  Last data filed at 11/1/2021 1800  Gross per 24 hour   Intake 140 ml   Output 400 ml   Net -260 ml       Physical Exam:  General: Not in obvious distress.  HEENT: Normocephalic, normocephalic  Chest: Decreased but clear to auscultation bilateral anteriorly, no wheezing, unlabored breathing  Heart: S1S2 normal, regular  Abdomen: Soft. NT, ND. Bowel sounds- active.  Neuro: Awake, follows simple commands, hard of hearing, moves all extremities    Lab Results:(I have personally reviewed the results)    Recent  Results (from the past 24 hour(s))   Lactic Acid STAT    Collection Time: 11/06/21  1:00 PM   Result Value Ref Range    Lactic Acid 2.5 (H) 0.7 - 2.0 mmol/L   Basic metabolic panel    Collection Time: 11/06/21  4:47 PM   Result Value Ref Range    Sodium 144 136 - 145 mmol/L    Potassium 4.2 3.5 - 5.0 mmol/L    Chloride 107 98 - 107 mmol/L    Carbon Dioxide (CO2) 26 22 - 31 mmol/L    Anion Gap 11 5 - 18 mmol/L    Urea Nitrogen 62 (H) 8 - 28 mg/dL    Creatinine 2.43 (H) 0.70 - 1.30 mg/dL    Calcium 9.1 8.5 - 10.5 mg/dL    Glucose 134 (H) 70 - 125 mg/dL    GFR Estimate 22 (L) >60 mL/min/1.73m2   Asymptomatic COVID-19 Virus (Coronavirus) by PCR Nasopharyngeal    Collection Time: 11/06/21  5:47 PM    Specimen: Nasopharyngeal; Swab   Result Value Ref Range    SARS CoV2 PCR Negative Negative   CBC with platelets    Collection Time: 11/07/21  5:43 AM   Result Value Ref Range    WBC Count 46.7 (H) 4.0 - 11.0 10e3/uL    RBC Count 2.39 (L) 4.40 - 5.90 10e6/uL    Hemoglobin 7.9 (L) 13.3 - 17.7 g/dL    Hematocrit 27.4 (L) 40.0 - 53.0 %     (H) 78 - 100 fL    MCH 33.1 (H) 26.5 - 33.0 pg    MCHC 28.8 (L) 31.5 - 36.5 g/dL    RDW 17.0 (H) 10.0 - 15.0 %    Platelet Count 432 150 - 450 10e3/uL   Basic metabolic panel    Collection Time: 11/07/21  5:43 AM   Result Value Ref Range    Sodium 143 136 - 145 mmol/L    Potassium 4.2 3.5 - 5.0 mmol/L    Chloride 107 98 - 107 mmol/L    Carbon Dioxide (CO2) 27 22 - 31 mmol/L    Anion Gap 9 5 - 18 mmol/L    Urea Nitrogen 61 (H) 8 - 28 mg/dL    Creatinine 2.34 (H) 0.70 - 1.30 mg/dL    Calcium 9.2 8.5 - 10.5 mg/dL    Glucose 114 70 - 125 mg/dL    GFR Estimate 23 (L) >60 mL/min/1.73m2   RBC and Platelet Morphology    Collection Time: 11/07/21  5:43 AM   Result Value Ref Range    Platelet Assessment  Automated Count Confirmed. Platelet morphology is normal.     Automated Count Confirmed. Platelet morphology is normal.    Basophilic Stippling Present (A) None Seen    RBC Morphology Confirmed  RBC Indices          Serum Glucose range:   Recent Labs   Lab 11/07/21  0543 11/06/21  1647 11/06/21  0631 11/06/21  0610    134* 86 90     ABG:   Recent Labs   Lab 11/01/21  2308   PH 7.28*   PCO2 55*   PO2 58*   HCO3 23   O2PER 50     CBC:   Recent Labs   Lab 11/07/21  0543 11/06/21  0610 11/05/21  0644 11/04/21  1322 11/04/21  0559   WBC 46.7* 60.6* 62.3* 56.5* 47.6*   HGB 7.9* 8.5* 8.4* 8.4* 8.0*   HCT 27.4* 29.2* 28.2* 28.2* 26.6*   * 114* 112* 110* 111*    533* 550* 545* 549*   NEUTROPHIL  --   --   --  98 97   LYMPH  --   --   --  0 1   MONOCYTE  --   --   --  2 2   EOSINOPHIL  --   --   --  0 0     Chemistry:   Recent Labs   Lab 11/07/21  0543 11/06/21  1647 11/06/21  0610 11/05/21  0644 11/04/21  0559 11/03/21  0553 11/02/21  1641 11/02/21  0527 11/01/21  0808    144 143 140 142   < >  --  144 143   POTASSIUM 4.2 4.2 4.3 4.6 4.6   < >  --  5.2* 4.9   CHLORIDE 107 107 107 106 106   < >  --  109* 109*   CO2 27 26 28 25 27   < >  --  27 27   BUN 61* 62* 59* 51* 43*   < >  --  28 28   CR 2.34* 2.43* 2.22* 1.96* 1.85*   < >  --  1.51* 1.28   GFRESTIMATED 23* 22* 25* 29* 31*   < >  --  39* 48*   CIRA 9.2 9.1 9.7 9.7 9.8   < >  --  8.9 9.2   PROTTOTAL  --   --   --   --   --   --  5.9* 5.4* 5.7*   ALBUMIN  --   --   --  3.2* 3.2*  --   --  3.2* 3.4*   AST  --   --   --   --   --   --   --  21 21   ALT  --   --   --   --   --   --   --  <9 <9   ALKPHOS  --   --   --   --   --   --   --  142* 146*   BILITOTAL  --   --   --   --   --   --   --  0.5 0.5    < > = values in this interval not displayed.     Coags:  No results for input(s): INR, PROTIME, PTT in the last 168 hours.    Invalid input(s): APTT  Cardiac Markers:  No results for input(s): CKTOTAL, TROPONINI in the last 168 hours.     Chest XR,  PA & LAT    Result Date: 10/30/2021  EXAM: XR CHEST 2 VW LOCATION: [facility] DATE/TIME: 10/30/2021 12:01 AM INDICATION: fatigue COMPARISON: 08/12/2019     IMPRESSION:  Heart size prominent on this AP view but unchanged. Pulmonary vascularity normal. Subsegmental atelectasis or scarring in the bases. Small bilateral pleural effusions posteriorly in the costophrenic angles. Upper lung fields are clear. Pacemaker lead tips right atrium and right ventricle. Clips upper abdomen.    Echocardiogram Complete    Result Date: 10/30/2021  [id number]^SHAYY^KAIDEN^[place] [address]  Name: GEE OGDEN MRN: [id number] : 1928 Study Date: 10/30/2021 10:42 AM Age: 89 yrs Gender: Male Patient Location: [facility] Reason For Study: Chest Discomfort Ordering Physician: KAIDEN LUCAS Performed By: JUANA  BSA: 2.2 m2 Height: 74 in Weight: 203 lb HR: 78 ______________________________________________________________________________ Procedure Definity ([facility]C #47853-264) given intravenously. ______________________________________________________________________________ Interpretation Summary  Definity contrast utilized The left ventricle is mildly dilated. There is mild to moderate concentric left ventricular hypertrophy. LV systolic function appears lower limits of normal The visual ejection fraction is 50-55%. Septal wall motion abnormality may reflect pacemaker activation. TAPSE is normal, which is consistent with normal right ventricular systolic function. Pacemaker lead in the right heart There is mild (1+) tricuspid regurgitation. Estimate of RV systolic pressure is elevated at 54mmhg plus right atrial pressure Mild to moderate valvular aortic stenosis.Peak velocity 2.6 m/s,mean gradient 14mmhg.2D visualization of the aortic valve suggests the potential for more significant aortic stenosis then calculated by doppler examination There is mild (1+) aortic regurgitation. The aortic root is moderately dilated.Measures approxmately 4.6cm Mild to moderate enlargement of the proximal ascending aorta measures approximately 4.4cm  Consider CT,GABE or MRI (if pacer compatible) to further define the aortic valve and thoracic aorta No prior study available for comparison ______________________________________________________________________________ Left Ventricle The left ventricle is mildly dilated. There is mild to moderate concentric left ventricular hypertrophy. Diastolic Doppler findings (E/E' ratio and/or other parameters) suggest left ventricular filling pressures are normal. The visual ejection fraction is 50-55%. LV systolic function appears lower limits of normal. Septal wall motion abnormality may reflect pacemaker activation.  Right Ventricle The right ventricle is not well visualized. The right ventricle is mildly dilated. TAPSE is normal, which is consistent with normal right ventricular systolic function. The right ventricular systolic function is normal. There is a pacemaker lead in the right ventricle.  Atria The left atrium is mild to moderately dilated. Pacer wire in right atrium. Right atrium not well visualized. The right atrium is mildly dilated.  Mitral Valve The mitral valve leaflets appear thickened, but open well. There is moderate mitral annular calcification. There is mild (1+) mitral regurgitation.  Tricuspid Valve There is mild (1+) tricuspid regurgitation. Moderate (46-55mmHg) pulmonary hypertension is present. The right ventricular systolic pressure is approximated at 53.5 mmHg plus the right atrial pressure.  Aortic Valve Moderate to severe aortic valve leaflet calcification. There is mild (1+) aortic regurgitation. Mild to moderate valvular aortic stenosis.  Pulmonic Valve There is mild (1+) pulmonic valvular regurgitation.  Vessels The aortic root is moderately dilated.Measures approxmately 4.6cm. Mild to moderate enlargement of the proximal ascending aorta measures approximately 4.4cm. IVC diameter <2.1 cm collapsing >50% with sniff suggests a normal RA pressure of 3 mmHg.  Pericardium Trivial pericardial  effusion.  ______________________________________________________________________________ MMode/2D Measurements & Calculations IVSd: 1.4 cm LVIDd: 6.9 cm LVIDs: 4.0 cm LVPWd: 1.4 cm  FS: 42.1 % LV mass(C)d: 479.8 grams LV mass(C)dI: 219.4 grams/m2 Ao root diam: 4.6 cm LA dimension: 4.6 cm asc Aorta Diam: 4.7 cm LA/Ao: 1.0 LVOT diam: 3.0 cm LVOT area: 7.2 cm2 RWT: 0.40  Time Measurements MM HR: 74.0 BPM  Doppler Measurements & Calculations MV E max indio: 67.3 cm/sec MV A max indio: 90.3 cm/sec MV E/A: 0.75 MV dec slope: 326.6 cm/sec2 MV dec time: 0.21 sec Ao V2 max: 232.4 cm/sec Ao max P.0 mmHg Ao V2 mean: 177.2 cm/sec Ao mean P.3 mmHg Ao V2 VTI: 54.3 cm DEEPIKA(I,D): 2.5 cm2 DEEPIKA(V,D): 3.2 cm2 AI P1/2t: 518.4 msec LV V1 max P.2 mmHg LV V1 max: 102.3 cm/sec LV V1 VTI: 19.1 cm  SV(LVOT): 137.7 ml SI(LVOT): 63.0 ml/m2 PA acc time: 0.07 sec PI end-d indio: 132.7 cm/sec TR max indio: 365.9 cm/sec TR max P.5 mmHg AV Indio Ratio (DI): 0.44 DEEPIKA Index (cm2/m2): 1.2 E/E' av.9 Lateral E/e': 6.7 Medial E/e': 11.0  ______________________________________________________________________________ Report approved by: Trev Jason 10/30/2021 12:53 PM       XR Chest Port 1 View    Result Date: 2021  EXAM: XR CHEST PORT 1 VIEW LOCATION: [facility] DATE/TIME: 2021 11:10 AM INDICATION: persistent hypoxia COMPARISON: 10/29/2021     IMPRESSION: Stable position multilead left subclavian pacemaker. Stable heart size. There is cephalization of blood flow, and small pleural effusions, increased from previous. These findings suggest CHF. Also, there is new left mid to inferior chest opacity which could represent asymmetric pulmonary edema, or pneumonia.    CT Abdomen Pelvis w Contrast  Result Date: 10/30/2021  IMPRESSION: 1.  Bibasilar atelectasis or infiltrate and small pleural effusions. 2.  Splenomegaly. 3.  The wall thickening not excluded. Correlate for proctitis. 4.  Diverticulosis. 5.   Remainder similar to previous.    CT Head w/o Contrast  Result Date: 10/31/2021  IMPRESSION: 1.  No CT evidence for acute intracranial process. 2.  Brain atrophy and presumed chronic microvascular ischemic changes as above. 3.  No change.      Latest radiology report personally reviewed.    Note created using dragon voice recognition software so sounds alike errors may have escaped editing.    11/07/2021   SADE RAMSEY MD  HOSPITALIST, [facility]  PAGER NO. [phone]

## 2021-11-07 NOTE — PROGRESS NOTES
"      RENAL PROGRESS NOTE     CC: Acute kidney injury    ROS: Dtr here now. Just got back to bed, was up in the chair, more alert today. Marginal PO intake and has restrictions and swallowing issues.  He denies SOB at rest. No N/V.  Says \"my back side hurts.\"    Assessment and Plan:       Acute kidney injury - Acute Tubular Necrosis.  Acute tubular necrosis secondary to infection vs. Intravascular depletion from diuresis of edema and heart failure.      Baseline creatinine 0.78 (5/19/2021)    Creatinine 1.6 (10/29)-->-->1.51 (11/2)-->1.6 (11/3)-->, 1.85 (11/4)-->1.95 (11/5)---> 2.21    Random urine sodium 66, urine osmolality 403 - C/w Acute Tubular Necrosis (11/4)    Urinalysis (10/30/2021) shows adequate urine specific gravity, albumin 3 mg/dL, no evidence of infection, hyaline casts 6  suggestive of intravascular depletion at that time.    Renal  ultrasound back in 2019 demonstrated normal renal size, will hold off on repeating ultrasound since a King catheter is in place and good urine is present in King bag at present (11/4)    ATN requires supportive care    Got Furosemide 40 mg IV daily. Cr worse, had HoTN. Holding diuretics, got some fluid but SL today and monitor off IVF and off diuretics til Monday.  It  Will be very challenging keeping him euvolemic.      -He is NOT a dialysis candidate even short term due to age and co-morbids and it would not improve his quality of life    -optimize oncotic pressure/Rx anemia/needs protein intake    -Very sensitive to Diuretic rx reveals poor GFR      2. Acute on chronic respiratory failure with hypoxia.  She has underlying chronic obstructive pulmonary disease on home oxygen, presented with findings consistent with congestive heart failure, respiratory distress has improved with administration of IV furosemide.  He seems to be responding well to IV diuretics.    See comments above, patient likely needs some degree of diuretic support.  Edema still present    Holding " "diuretics at present  3. Chronic Diastolic heart failure.  Left ventricular ejection fraction 50-55%.  Left ventricular hypertrophy present.  Patient has pulmonary hypertension as well.   4. Pulmonary hypertension.  Secondary to chronic obstructive pulmonary disease with some probable additional contribution from heart failure.  5. 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  Chronic anemia.  Known splenomegaly.  Continue management as per [place] [facility].  6. Disp-Very poor prognosis. Is DNR. I rec Palliative Consult.     D/w Dtr, Melanie at bedside    Chas Aguirre MD  [facility]  [phone]    PHYSICAL EXAM  /59 (BP Location: Right arm)   Pulse 70   Temp 98.8  F (37.1  C) (Axillary)   Resp 20   Ht 1.88 m (6' 2\")   Wt 93.7 kg (206 lb 9.6 oz)   SpO2 95%   BMI 26.53 kg/m        Wt Readings from Last 3 Encounters:   11/04/21 93.7 kg (206 lb 9.6 oz)   09/09/19 101.3 kg (223 lb 6.4 oz)   10/11/19 102.8 kg (226 lb 9.6 oz)       GENERAL: Chronically ill and debilitated. Elderly, alert, FM O2 on  HEAD: Normal  HEENT: No eyelid edema.  CARDIOVASCULAR: No JVD present.  Some sacral dependent edema  PULMONARY: No respiratory distress while receiving oxygen via FM, No cyanosis  GASTROINTESTINAL: No ascites present  MSK: Diffuse muscle wasting, no joint deformities  NEURO: Very somnolent.   SKIN: Pale, no jaundice, no rash.    LABORATORIES  Recent Labs   Lab 11/07/21  0543 11/06/21  0610 11/05/21  0644   WBC 46.7* 60.6* 62.3*   HGB 7.9* 8.5* 8.4*   HCT 27.4* 29.2* 28.2*    533* 550*     Recent Labs   Lab 11/07/21  0543 11/06/21  1647 11/06/21  0610 11/03/21  0553 11/02/21  0527 11/01/21  0808    144 143   < > 144 143   CO2 27 26 28   < > 27 27   BUN 61* 62* 59*   < > 28 28   ALKPHOS  --   --   --   --  142* 146*   ALT  --   --   --   --  <9 <9   AST  --   --   --   --  21 21    < > = values in this interval not displayed.       RADIOLOGY " REPORTS    ECHOCARDIOGRAM  Cardiac echo 10/30/2021:  Interpretation Summary     Definity contrast utilized  The left ventricle is mildly dilated.  There is mild to moderate concentric left ventricular hypertrophy.  LV systolic function appears lower limits of normal  The visual ejection fraction is 50-55%.  Septal wall motion abnormality may reflect pacemaker activation.  TAPSE is normal, which is consistent with normal right ventricular systolic  function.  Pacemaker lead in the right heart  There is mild (1+) tricuspid regurgitation.  Estimate of RV systolic pressure is elevated at 54mmhg plus right atrial  pressure  Mild to moderate valvular aortic stenosis.Peak velocity 2.6 m/s,mean gradient  14mmhg.2D visualization of the aortic valve suggests the potential for more  significant aortic stenosis then calculated by doppler examination  There is mild (1+) aortic regurgitation.  The aortic root is moderately dilated.Measures approxmately 4.6cm  Mild to moderate enlargement of the proximal ascending aorta measures  approximately 4.4cm  Consider CT,GABE or MRI (if pacer compatible) to further define the aortic  valve and thoracic aorta  No prior study available for comparison          MEDICATIONS    heparin ANTICOAGULANT  5,000 Units Subcutaneous Q12H     hydroxyurea  1,000 mg Oral Daily     ipratropium - albuterol 0.5 mg/2.5 mg/3 mL  3 mL Nebulization 4x daily     lactobacillus rhamnosus (GG)  1 capsule Oral BID     mirtazapine  3.75 mg Oral At Bedtime     multivitamin, therapeutic  1 tablet Oral Daily     pantoprazole  40 mg Oral QAM AC     polyethylene glycol  17 g Oral Daily     senna-docusate  1 tablet Oral Daily         Above laboratories and medications were personally reviewed during evaluation today.

## 2021-11-08 NOTE — PLAN OF CARE
Problem: Adult Inpatient Plan of Care  Goal: Optimal Comfort and Wellbeing  Outcome: No Change   Pt c/o pain in coccyx, repositioning utilized and PRN tylenol administered. Pt reported interventions successful.     Problem: Gas Exchange Impaired  Goal: Optimal Gas Exchange  Outcome: No Change  Pt tolerating CPAP while napping this evening. Pt's mask noted to slip off at times due to patient opening jaw wide and mask falling into mouth. Will monitor closely this evening and pass onto oncoming shift to monitor.     Problem: Oral Intake Inadequate COPD (Chronic Obstructive Pulmonary Disease)  Goal: Improved Nutrition Intake  Outcome: No Change   Daughter ordered meal for patient this evening, pt declined eating or drinking anything. CPAP mask on. Pt tolerating at this time. Will continue to monitor.

## 2021-11-08 NOTE — PROGRESS NOTES
"      RENAL PROGRESS NOTE     CC: Acute kidney injury    ROS: Dtr here now. Just got back to bed, was up in the chair, more alert today. Marginal PO intake and has restrictions and swallowing issues.  He denies SOB at rest. No N/V.  Says \"my back side hurts.\"    Assessment and Plan:       Acute kidney injury - Acute Tubular Necrosis.  Acute tubular necrosis secondary to infection vs. intravascular depletion from diuresis of edema and heart failure.      Baseline creatinine 0.78 (5/19/2021)    Random urine sodium 66, urine osmolality 403 - C/w Acute Tubular Necrosis (11/4)    Worse again today, with advanced age and comorbidities, expect renal function may continue to worsen  He is NOT a dialysis candidate even short term due to age and co-morbids and it would not improve his quality of life  -treatment options to improve renal function very limited and his resp status very tenuous, agree with plan for palliative care eval    2. Acute on chronic respiratory failure with hypoxia.   underlying chronic obstructive pulmonary disease on home oxygen, presented with findings consistent with congestive heart failure  -JERED after diuresis  -poorly tolerant of bipap, has ongoing co2 retention  -would favor continuing to hold diuretics, if worsening hypoxia, can redose diuretics with understanding that may precipitate further JERED  -as above, with multiorgan failure, expect approaching limits of medical management, agree with plan for palliative care, would certainly support comfort care now . ..   3. Chronic Diastolic heart failure.  Left ventricular ejection fraction 50-55%.  Left ventricular hypertrophy present.  Patient has pulmonary hypertension as well which contributes to intolerance of diuresis   4. 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  Chronic anemia.  Known splenomegaly.  Continue management as per [facility].  5. Disp-Very poor prognosis. Is DNR. Agree with plan " "for Palliative Consult.  Comfort care would be very reasonable.     D/w daughter, Dr HEMAL Pleitez MD  [facility]  [phone]  PHYSICAL EXAM  /50 (Patient Position: Semi-[employer]oyd[employer])   Pulse 70   Temp 96.8  F (36  C) (Axillary)   Resp 24   Ht 1.88 m (6' 2\")   Wt 93.7 kg (206 lb 9.6 oz)   SpO2 92%   BMI 26.53 kg/m        Wt Readings from Last 3 Encounters:   11/04/21 93.7 kg (206 lb 9.6 oz)   09/09/19 101.3 kg (223 lb 6.4 oz)   10/11/19 102.8 kg (226 lb 9.6 oz)       GENERAL: Chronically ill and debilitated. FM O2 on  HEAD: Normal  HEENT: no scleral icterus, oral mucous membranes dry  CARDIOVASCULAR: No JVD present.  Trace diffuse edema  PULMONARY: dec bs b/l, mild tachypnea at rest  GASTROINTESTINAL: Nt/nd  MSK: Diffuse muscle wasting, no joint deformities  NEURO: awake but slow to respond, diffusely weak  SKIN: Pale, no jaundice, scattered brusing    LABORATORIES  Recent Labs   Lab 11/08/21  0548 11/07/21  0543 11/06/21  0610   WBC 36.5* 46.7* 60.6*   HGB 7.6* 7.9* 8.5*   HCT 26.2* 27.4* 29.2*    432 533*     Recent Labs   Lab 11/08/21  0548 11/07/21  0543 11/06/21  1647 11/03/21  0553 11/02/21  0527    143 144   < > 144   CO2 29 27 26   < > 27   BUN 66* 61* 62*   < > 28   ALKPHOS  --   --   --   --  142*   ALT  --   --   --   --  <9   AST  --   --   --   --  21    < > = values in this interval not displayed.       RADIOLOGY REPORTS    ECHOCARDIOGRAM  Cardiac echo 10/30/2021:  Interpretation Summary     Definity contrast utilized  The left ventricle is mildly dilated.  There is mild to moderate concentric left ventricular hypertrophy.  LV systolic function appears lower limits of normal  The visual ejection fraction is 50-55%.  Septal wall motion abnormality may reflect pacemaker activation.  TAPSE is normal, which is consistent with normal right ventricular systolic  function.  Pacemaker lead in the right heart  There is mild (1+) tricuspid " regurgitation.  Estimate of RV systolic pressure is elevated at 54mmhg plus right atrial  pressure  Mild to moderate valvular aortic stenosis.Peak velocity 2.6 m/s,mean gradient  14mmhg.2D visualization of the aortic valve suggests the potential for more  significant aortic stenosis then calculated by doppler examination  There is mild (1+) aortic regurgitation.  The aortic root is moderately dilated.Measures approxmately 4.6cm  Mild to moderate enlargement of the proximal ascending aorta measures  approximately 4.4cm  Consider CT,GABE or MRI (if pacer compatible) to further define the aortic  valve and thoracic aorta  No prior study available for comparison          MEDICATIONS    heparin ANTICOAGULANT  5,000 Units Subcutaneous Q12H     hydroxyurea  1,000 mg Oral Daily     ipratropium - albuterol 0.5 mg/2.5 mg/3 mL  3 mL Nebulization 4x daily     lactobacillus rhamnosus (GG)  1 capsule Oral BID     mirtazapine  3.75 mg Oral At Bedtime     multivitamin, therapeutic  1 tablet Oral Daily     pantoprazole  40 mg Oral QAM AC     piperacillin-tazobactam  3.375 g Intravenous Q8H     polyethylene glycol  17 g Oral Daily     senna-docusate  1 tablet Oral Daily         Above laboratories and medications were personally reviewed during evaluation today.

## 2021-11-08 NOTE — PROGRESS NOTES
Progress Note     Primary Oncologist/Hematologist:            Assessment and Plan:New actions/orders today (11/08/2021) are underlined.     IMPRESSION:   1. COPD/Emphysema with hypoxemic respiratory failure  2. Jak2 positive myeloproliferative disorder, stable on Hydroxyurea 500 mg oral daily  3. Macrocytic anemia:  MDS vs myelofibrosis, stable around Hb 8.0 g/dL  4. 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 Thrombocytosis - PLT declining   6. Splenomegaly, progressively worsening     PLAN:  1. We will reduce Hydrea from 1000 mg daily to 500 mg daily.  2. Smear with MPN had changes consistent with inflammation.  3. We will check iron studies, ferritin, retic, TSH, Vitamin B12, and folate levels.  4. Appreciate Palliative Care. Patient's family to have a goals of care discussion to help guide decision.  5. No bone marrow biopsy at this time.      We appreciate all person's involved in Ruben's care. Case discussed with Dr. Zepeda. If there are any questions or concerns, please do not hesitate to call.    Rosa Kraus PA-C  [facility]  Work Cell: [phone], available 8AM to 4PM        Interval History:     Ruben was seen today. He is now on BiPAP. His daughter was present and inquired about the BMBx, which I discussed with her that we would not pursue this at this time. The patient has been tired and weak. The patient is not a dialysis candidate due to age and comorbid conditions and because it will not improve his quality of life.                 Review of Systems:     The 5 point Review of Systems is negative other than noted in the HPI             Medications:   Scheduled Medications    heparin ANTICOAGULANT  5,000 Units Subcutaneous Q12H     hydroxyurea  1,000 mg Oral Daily     ipratropium - albuterol 0.5 mg/2.5 mg/3 mL  3 mL Nebulization 4x daily     lactobacillus rhamnosus (GG)  1 capsule Oral BID      "mirtazapine  3.75 mg Oral At Bedtime     multivitamin, therapeutic  1 tablet Oral Daily     pantoprazole  40 mg Oral QAM AC     piperacillin-tazobactam  3.375 g Intravenous Q8H     polyethylene glycol  17 g Oral Daily     senna-docusate  1 tablet Oral Daily     PRN Medications  acetaminophen, albuterol, alum & mag hydroxide-simethicone, benzonatate, bisacodyl, bisacodyl, diclofenac, magnesium hydroxide, melatonin, naloxone, naloxone, naloxone, naloxone, ondansetron, senna-docusate, [COMPLETED] sodium phosphate **FOLLOWED BY** sodium phosphate, sodium phosphate               Physical Exam:   Vitals were reviewed  Blood pressure 108/50, pulse 70, temperature 96.8  F (36  C), temperature source Axillary, resp. rate 24, height 1.88 m (6' 2\"), weight 93.7 kg (206 lb 9.6 oz), SpO2 92 %.  Wt Readings from Last 4 Encounters:   11/04/21 93.7 kg (206 lb 9.6 oz)   09/09/19 101.3 kg (223 lb 6.4 oz)   10/11/19 102.8 kg (226 lb 9.6 oz)   09/03/19 103 kg (227 lb 1.6 oz)       I/O last 3 completed shifts:  In: 651 [P.O.:150; IV Piggyback:501]  Out: -     Constitutional: Awake, alert, cooperative, no apparent distress   Lungs: Clear to auscultation bilaterally, no crackles or wheezing   Cardiovascular: Regular rate and rhythm, normal S1 and S2, and no murmur noted   Abdomen: Normal bowel sounds, soft, non-distended, non-tender   Skin: No rashes, no cyanosis, no edema   Other:               Data:   All laboratory data and imaging studies reviewed.    CMP  Recent Labs   Lab 11/08/21  0548 11/07/21  0543 11/06/21  1647 11/06/21  0631 11/06/21  0610 11/05/21  0644 11/04/21  0559 11/03/21  0553 11/02/21  1641 11/02/21  0527    143 144  --  143 140 142   < >  --  144   POTASSIUM 4.5 4.2 4.2  --  4.3 4.6 4.6   < >  --  5.2*   CHLORIDE 107 107 107  --  107 106 106   < >  --  109*   CO2 29 27 26  --  28 25 27   < >  --  27   ANIONGAP 6 9 11  --  8 9 9   < >  --  8   GLC 95 114 134* 86 90 113 144*   < >  --  96   BUN 66* 61* 62*  --  " 59* 51* 43*   < >  --  28   CR 2.75* 2.34* 2.43*  --  2.22* 1.96* 1.85*   < >  --  1.51*   GFRESTIMATED 19* 23* 22*  --  25* 29* 31*   < >  --  39*   CIRA 9.3 9.2 9.1  --  9.7 9.7 9.8   < >  --  8.9   PHOS  --   --   --   --   --  3.2 3.3  --   --   --    PROTTOTAL  --   --   --   --   --   --   --   --  5.9* 5.4*   ALBUMIN  --   --   --   --   --  3.2* 3.2*  --   --  3.2*   BILITOTAL  --   --   --   --   --   --   --   --   --  0.5   ALKPHOS  --   --   --   --   --   --   --   --   --  142*   AST  --   --   --   --   --   --   --   --   --  21   ALT  --   --   --   --   --   --   --   --   --  <9    < > = values in this interval not displayed.     CBC  Recent Labs   Lab 11/08/21  0548 11/07/21  0543 11/06/21  0610 11/05/21  0644   WBC 36.5* 46.7* 60.6* 62.3*   RBC 2.30* 2.39* 2.56* 2.53*   HGB 7.6* 7.9* 8.5* 8.4*   HCT 26.2* 27.4* 29.2* 28.2*   * 115* 114* 112*   MCH 33.0 33.1* 33.2* 33.2*   MCHC 29.0* 28.8* 29.1* 29.8*   RDW 17.0* 17.0* 17.3* 17.0*    432 533* 550*     INRNo lab results found in last 7 days.  Data   Results for orders placed or performed during the hospital encounter of 10/29/21 (from the past 24 hour(s))   Lactic Acid STAT   Result Value Ref Range    Lactic Acid 1.3 0.7 - 2.0 mmol/L   Blood gas arterial   Result Value Ref Range    pH Arterial 7.24 (LL) 7.37 - 7.44    pCO2 Arterial 66 (H) 35 - 45 mm Hg    pO2 Arterial 65 (L) 75 - 85 mm Hg    Bicarbonate Arterial 25 23 - 29 mmol/L    O2 Sat, Arterial 92.4 (L) 95.0 - 96.0 %    Oxyhemoglobin 90.5 (L) 95.0 - 96.0 %    Base Excess/Deficit (+/-) 0.9   mmol/L    Ventilation Mode oxymask     Flow 15.0 LPM    Sample Stabilized Temperature 37.0 degrees C   XR Chest Port 1 View    Narrative    EXAM: XR CHEST PORT 1 VIEW  LOCATION: [facility]  DATE/TIME: 11/8/2021 4:40 AM    INDICATION: Increase in oxygen needs  COMPARISON: 11/03/2021, 11/02/2021      Impression    IMPRESSION: New focal area of opacity involving the right  upper lobe with areas of patchy opacity involving in both lower lungs. These findings can be seen with pneumonia in the proper clinical setting. Mild cardiac enlargement with pulmonary vascular   congestion indicating component of CHF/volume overload. Left-sided cardiac pacemaker. Aortic calcification.   CBC with platelets   Result Value Ref Range    WBC Count 36.5 (H) 4.0 - 11.0 10e3/uL    RBC Count 2.30 (L) 4.40 - 5.90 10e6/uL    Hemoglobin 7.6 (L) 13.3 - 17.7 g/dL    Hematocrit 26.2 (L) 40.0 - 53.0 %     (H) 78 - 100 fL    MCH 33.0 26.5 - 33.0 pg    MCHC 29.0 (L) 31.5 - 36.5 g/dL    RDW 17.0 (H) 10.0 - 15.0 %    Platelet Count 355 150 - 450 10e3/uL   Basic metabolic panel   Result Value Ref Range    Sodium 142 136 - 145 mmol/L    Potassium 4.5 3.5 - 5.0 mmol/L    Chloride 107 98 - 107 mmol/L    Carbon Dioxide (CO2) 29 22 - 31 mmol/L    Anion Gap 6 5 - 18 mmol/L    Urea Nitrogen 66 (H) 8 - 28 mg/dL    Creatinine 2.75 (H) 0.70 - 1.30 mg/dL    Calcium 9.3 8.5 - 10.5 mg/dL    Glucose 95 70 - 125 mg/dL    GFR Estimate 19 (L) >60 mL/min/1.73m2   Procalcitonin   Result Value Ref Range    Procalcitonin 0.50 (H) 0.00 - 0.49 ng/mL   B-Type Natriuretic Peptide ([facility] Only)   Result Value Ref Range     (H) 0 - 93 pg/mL   Blood gas arterial   Result Value Ref Range    pH Arterial 7.30 (L) 7.37 - 7.44    pCO2 Arterial 57 (H) 35 - 45 mm Hg    pO2 Arterial 59 (L) 75 - 85 mm Hg    Bicarbonate Arterial 25 23 - 29 mmol/L    O2 Sat, Arterial 91.3 (L) 95.0 - 96.0 %    Oxyhemoglobin 89.7 (L) 95.0 - 96.0 %    Base Excess/Deficit (+/-) 1.3   mmol/L    Ventilation Mode Oxymask     Flow 7.0 LPM    Sample Stabilized Temperature 37.0 degrees C

## 2021-11-08 NOTE — PROGRESS NOTES
CLINICAL NUTRITION SERVICES - REASSESSMENT NOTE      RECOMMENDATIONS FOR MD/PROVIDER TO ORDER:   Consider resuming bowel meds as LBM 11/3/21   If not on comfort cares-Recommend initiate nutrition support pending patient plan of care      Recommendations Ordered by Registered Dietitian (RD):   None     Future/Additional Recommendations:   Recommend initiate nutrition support pending patient plan of care     Malnutrition:   Moderate in the context of acute on chronic illness (please carry forward if malnutrition diagnosed)         EVALUATION OF PROGRESS TOWARD GOALS   Diet:  NPO    Nutrition Support:  none    Intake/Tolerance:  Per daughter patient has been eating < 50% of meals before NPO. Occasionally, patient would eat at least half. Patient like the Ensure Enlive.       ASSESSED NUTRITION NEEDS:  Dosing Weight: 93.4 kg     ASSESSED NUTRITION NEEDS  Estimated Energy Needs: 9434-3788 kcals/day (25 - 30 kcals/kg)  Justification: Maintenance  Estimated Protein Needs: 112-140 grams protein/day (1.2 - 1.5 grams of pro/kg)  Justification: Repletion  Estimated Fluid Needs: 8238-8015 mL/day (25 - 30 mL/kg)   Justification: Maintenance    NEW FINDINGS:   11/04/21 0900 93.7 kg (206 lb 9.6 oz)           - no new weights   11/01/21 0542 93.4 kg (206 lb)   10/29/21 2029 92.1 kg (203 lb)     06/01/21 05/19/21 100.9 kg (222 lb 6.4 oz)  97.6 kg (215 lb )                      4% wt loss in 6 months       LBM 11/3/21    Meds: culturelle, remeron, protonix, zosyn     Patient was receiving on average 2684kcals and 102g protein prior to NPO. Was eating 0-100% of meals per nursing records with an estimated intake of 52% of energy needs for 2 days.     Previous Goals:   Intake > 75% of estimated needs  Maintain weight  Evaluation: Not met and Unable to evaluate    Previous Nutrition Diagnosis:   Malnutrition related to chronic illness as evidenced by intake < 75% x 2 months and 7.2% weight loss x 2 months  Evaluation:  Declining      MALNUTRITION  % Weight Loss:  Weight loss does not meet criteria for malnutrition however may with updated weights  % Intake:  <75% for > 7 days (moderate malnutrition)  Subcutaneous Fat Loss:  None observed  Muscle Loss:  Clavicle bone region moderate depletion, Acromion bone region severe depletion, Patellar region moderate depletion and Anterior thigh region moderate depletion  Fluid Retention:  Moderate 2+ generalized and both ankles    Malnutrition Diagnosis: Moderate malnutrition  In Context of:  Acute illness or injury  Chronic illness or disease    CURRENT NUTRITION DIAGNOSIS  Malnutrition related to acute tubular necrosis, prostate cancer, COPD w/ respiratory failiure as evidenced by prolonged poor intake of < 75% estimated energy needs for > 1 week, moderate fluid accumulation, and moderate to severe muscle loss     INTERVENTIONS  Recommendations / Nutrition Prescription  Recommend initiate nutrition support pending patient plan of care  Recommend resuming bowel meds w/ LBM 11.3.21    Implementation  Collaboration and Referral of Nutrition care    Goals  Meet estimated nutrition needs  No significant weight loss    MONITORING AND EVALUATION:  Progress towards goals will be monitored and evaluated per protocol and Practice Guidelines, Diet Order, Weight and Nutrition-focused physical findings

## 2021-11-08 NOTE — PLAN OF CARE
"  Problem: Adult Inpatient Plan of Care  Goal: Absence of Hospital-Acquired Illness or Injury  Outcome: No Change  Intervention: Identify and Manage Fall Risk  Recent Flowsheet Documentation  Taken 11/8/2021 0744 by Betty Encarnacion RN  Safety Promotion/Fall Prevention: activity supervised  Intervention: Prevent Skin Injury  Recent Flowsheet Documentation  Taken 11/8/2021 0914 by Betty Encarnacion RN  Body Position: turned  Taken 11/8/2021 0744 by Betty Encarnacion RN  Body Position: turned  Intervention: Prevent Infection  Recent Flowsheet Documentation  Taken 11/8/2021 0744 by Betty Encarnacion RN  Infection Prevention: rest/sleep promoted    Pt had reported increased pain and discomfort.  Did give pt a bed bath, put mepilex on sacral area for protection, applied barrier cream.  Bladder scan shows 22 ml in bladder, very little to no urine output.  Pt appears comfortable, on bipap, O2 is 40%.  Pt did receive fluid on noc shift due to low BP.  CXR shows new infiltrate, NPO due to possibility of aspiration pneumonia.  Family is continually stating they are hopeful he will \"turn the corner\".  Daughter Iliana is in the room.  Will continue to monitor and update as needed.  "

## 2021-11-08 NOTE — PROGRESS NOTES
Overnight event reviewed.    Appreciated house officer and pulmonary consult.  This morning patient more alert and awake, reported breathing improving, currently on 7 L oxygen mask, continue.  However, if worsening breathing then will place back on BiPAP.  Discussed with nursing staffs.  Discussed with patient's daughter at bedside about overall medical conditions.  Awaiting palliative care consult.    Full note to follow.    11/8/2021  SADE RAMSEY MD  [facility]  PAGER NO. [phone]

## 2021-11-08 NOTE — CONSULTS
"[facility]  Palliative Care Consultation Note    Patient: Efe Huertas  Date of Admission:  10/29/2021    Requesting Clinician / Team: Juan Reason for consult: Decisional support    He is NOT a dialysis candidate even short term due to age and co-morbids and it would not improve his quality of life  -treatment options to improve renal function very limited and his resp status very tenuous, agree with plan for palliative care eval        Recommendations       1)   GOALS OF CARE are .   House officer emphasized to Willian via phone that their dad is elderly with quite complex medical disease and has a poor prognosis per day team's assessment.  He discussed that it is very reasonable that his respiratory status worsens to the point where he would medically require intubation should that be desired. He discussed the benefits and risks of this, especially in the context of \"no CPR\" and not a good dialysis candidate per nephro.  Questions were answered.  Ultimately, Willian decided that it would be best to transition to full DNR/DNI status.      ? I met with Iliana and reiterated this.  Please see [facility] discussion below.  I strongly recommended that we  have an in person family meeting tomorrow to discussed his medical condition further.  ? She indicated that her Dads goals are to try to improve and live, I again indicated that his renal function is pour and he would not be a good dialysis candidate. She was aware of this.    I  explained the balance of the kidneys and the heart and indicated that they are not working well   together.      2)    ADVANCED CARE PLANNING  ? Patient has completed health care directive:  N  ? Surrogate  health care agent: Iliana is the family spokes  Person, brother Ameya and Sister Melanie are also decision makers.   ? Code Status: DNR DNI. Ok for Bipap      3)    SYMPTOM MANAGEMENT       ? Dysphagia, due to  esophageal stenosis,,  ? Comment  " dilated this admissio  ? Recommedations        Dyspnea due to COPD  o Comment:  Was on 10L oxymask, CO2 66 and pO2 65, placed on BIpap : s/p bilateral thora consistent with transudates/CHF    Per pulmonary,   Trial of bipap is reasonable, would not recommend escalating aggressiveness of care beyond that, an ICU course is unlikely to change outcome. If bounces back, consideration of hospice seems appropriate given age, frailty and degree of co-morbidities.        Generalized weakness due to Multi organ issues (renal, Cardiac, pulmonary, GI and myeloproliferative disorder).      ?     4)    PSYCHOSOCIAL/SPIRITUAL SUPPORT  ? Will request spiritual care support  And Palliative SW support  ? Will continue with palliative social work support  ? Palliative medicine will continue to follow    These recommendations were discussed with Dr. RECIO.      Thank you for the opportunity to participate in the care of this patient and family. Our team: will continue to follow.     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 He endorsed whole body weakness and 2 falls in the past 2 months.       Recent Hospitalizations:  5/18 abd pain due to  Constipation     Interim Hx   Patient did better yesterday, watched the [email or web address] game.  However, the then had an episode of hypertension and probable aspiration pneumonia. Now on Zosyn.   Back on Bipap     Pulmonary:   has had ongoing issues with managing fluid status - worsening renal function and hypotension with diuresis, and worsening respiratory status with fluids. As outlined in renal note will be a challenge to maintain euvolemia, narrow therapeutic window given aortic stenosis and pulmonary hypertension. Limitations in care set in that deferring cardiac ischemic eval, and not an HD candidate    Renal: Acute kidney injury - Acute Tubular Necrosis.  Acute tubular necrosis secondary to infection vs. Intravascular depletion from diuresis of edema and heart failure. Got Furosemide 40 mg IV daily. Cr worse, had HoTN. Holding diuretics, got some fluid but SL today and monitor off IVF and off diuretics til Monday.  It  Will be very challenging keeping him euvolemic.     -He is NOT a dialysis candidate even short term due to age and co-morbids and it would not improve his quality of life    Oncology:  11/8 Discussed with Oncology PA.  She explained to daughter that number are improving and explained the Myeloproliferative disorder. Daughter asked about the polycythemia vera  And she explained both.   Hgb down today and RBC down.    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  PA Discussed with Dr. Hernandez. He is fairly confident this is just a reaction versus active infection or malignancy.   Hoping to see stabilization of leukocytosis over the weekend.          Prognosis, Goals, & Planning:           Functional Status just prior to hospitalization: 3 (Capable of only limited self-care; needs help with ADLs; in bed/chair >50% of waking hours)      Prognosis, Goals, and/or Advance Care Planning were addressed today: Yes        Summary/Comments:       Patient's decision making preferences: unable to assess          Patient has decision-making capacity today for complex decisions: No            I have concerns about the patient/family's health literacy today: Yes           Patient has a completed Health Care Directive: No.       Code status: No CPR / No Intubation      Goals of Care Discussion  I  Met with daughter Iliana and I Discussed role of Palliative Medicine referral (GOC, Symptom Management, Communication on current status, provide support and assist with explanation of cares to help them understand choices based on goals and patient's condition, etc).      While we were meeting the  Oncology PA came in..  She explained to daughter that number are improving and explained the Myeloproliferative disorder. Daughter asked about the polycythemia vera  And she explained both.   Hgb down today and RBC down. I explained to daughter if the Hgb drops less that 7, her Dad would not  Be a good candidate for a blood transfusion due to his issues with his kidneys.  She was having difficulty understanding the correlation between the kidneys and his heart and his overall declining status. She also needed detailed explanation of her Dad aspiration pneumonia and why he is NPO on the Bipap.  I tried to focus on what would mean a good quality of life for her dad, especially given how weak he was PTA.  I also introduced the concept of turning the defibrillator portion off since he is DNR DNI and explained that this firing would cause him more harm that good.  I explained that the pacer portion would remain on  ".    She was upset that her 2 siblings could not come in to visit with her. I explained  The Covid restrictions and she had  Hard time understanding that saying \"the people who are hospitalized all have Covid due to the vaccine not working and they have other health issues.\"  I indicated that the antivaxxers are the ones primarily hospitalized and the hospital has to protect the health of all by putting limitations on visiting.            Coping, Meaning, & Spirituality:         Mood, coping, and/or meaning in the context of serious illness were addressed today: Yes  Summary/Comments: daughter Iliana has poor  Health literacy     Social:      Home setting: lives in his own home, her brother has health issues and lives with him due to his own limitations  Marital Status not .   Support  at Home: two daughters help.   Prior Function: getting much weaker, family can no longer take care of him at home.    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     Congestive heart failure (H) 5/17/2007    Overview:  Systolic with EF 30-35% on ECHO 5/17/2007      COPD, group B, 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 (H) 2/21/2013     Dizziness 8/12/2019     Episodic mood disorder (H) 5/22/2020     Left foot drop 6/12/2009     NICM (nonischemic cardiomyopathy) (H) 3/26/2009     Obstructive airway disease (H) 2/21/2013     JANETH (obstructive sleep apnea) 10/30/2021     Polycythemia 10/30/2021     Prostate cancer (H) 10/30/2021     Splenomegaly 10/30/2021     Ventricular bigeminy  "        Past Surgical History:  Past Surgical History:   Procedure Laterality Date     ABDOMINAL AORTIC ANEURYSM REPAIR       AS REPAIR 1 COLLAT ANKLE LIGMNT,PRIMARY       CHOLECYSTECTOMY       COLECTOMY WITH COLOSTOMY, COMBINED       EP ICD / PACEMAKER / LOOP RECORDER      Pacemaker placement     ESOPHAGOSCOPY, GASTROSCOPY, DUODENOSCOPY (EGD), COMBINED N/A 11/5/2021    Procedure: ESOPHAGOGASTRODUODENOSCOPY;  Surgeon: Leighton Tony DO;  Location: [facility] OR     HERNIA REPAIR       HRW VEIN STRIPPER       IMPLANTED DEVICE       AZ ANESTH,REMOVAL OF ADRENAL       PROSTATECTOMY       REPAIR BLADDER       SIGMOIDOSCOPY FLEXIBLE N/A 11/5/2021    Procedure: SIGMOIDOSCOPY, FLEXIBLE;  Surgeon: Leighton Tony DO;  Location: [facility] OR     TAKEDOWN COLOSTOMY           Family History:  Family History   Family history unknown: Yes         Allergies:  Allergies   Allergen Reactions     Blood-Group Specific Substance Other (See Comments)     Patient has an anti-C.  Blood product orders may be delayed.  Please draw one red top tube and two lavender top tubes for all Type and Screens/ Type and Crossmatch orders.      Thiopental Nausea and Vomiting        Medications:  I have reviewed this patient's medication profile and medications from this hospitalization.            Physical Exam:     GENERAL:laying in bed, on Bipap NAD, opens eyes intermittently  SKIN: Warm and dry  HEENT: Normocephalic, anicteric sclera, moist mucous membranes  LUNGS: Clear to auscultation anterolaterally; non-labored, on O2 by Bipap  CARDIAC: RRR, normal s1/s2, w/o m/r/g   ABDOMINAL: BS (+), soft, non distended, non tender  :decreased urine output,  Bladder scan minimal  MUSKL: no gross joint deformities   EXTREMITIES:2+  edema   pulses 2+ and symmetrical warm  NEUROLOGIC:Alert and oriented x 2  PSYCH: lethargic    Vital Signs: Temp: 96.8  F (36  C) Temp src: Axillary BP: 108/50 Pulse: 70   Resp: 24 SpO2: 92 % O2 Device: BiPAP/CPAP Oxygen  Delivery: 15 LPM  Weight: 206 lbs 9.6 oz     Data Reviewed:      Recent imaging reviewed, my comments on pertinents:   11/8 CXR shows infiltrates     Echocardiogram 10/31/2021 (results reviewed):   The left ventricle is mildly dilated.  There is mild to moderate concentric left ventricular hypertrophy.  LV systolic function appears lower limits of normal  The visual ejection fraction is 50-55%.  Septal wall motion abnormality may reflect pacemaker activation.  TAPSE is normal, which is consistent with normal right ventricular systolic  function.  Pacemaker lead in the right heart  There is mild (1+) tricuspid regurgitation.  Estimate of RV systolic pressure is elevated at 54mmhg plus right atrial  pressure  Mild to moderate valvular aortic stenosis. Peak velocity 2.6 m/s,mean gradient  14mmhg. 2D visualization of the aortic valve suggests the potential for more  significant aortic stenosis then calculated by doppler examination  There is mild (1+) aortic regurgitation.  The aortic root is moderately dilated.Measures approxmately 4.6cm  Mild to moderate enlargement of the proximal ascending aorta measures  approximately 4.4cm    Recent lab data reviewed, my comments on pertinents:        Hgb 7.6, RBC's low   EF 50%  Alb 3.2  Cr 2.75  GFR 19       ====================================================  TT: I have personally spent a total of 110  minutes on the unit in review of medical record, consultation with the medical providers and assessment of patient today, with more than 50% of this time spent in counseling, coordination of care, and discussion with dtr candace whitten: diagnostic results, prognosis, symptom management, risks and benefits of management options, and development of plan of care as noted above.  ====================================================    GREY Salazar, NP-C, [facility]   [facility]  Palliative Medicine  Office: [phone]

## 2021-11-08 NOTE — CONSULTS
PULMONARY MEDICINE Progress Note  11/8/2021    Admit Date: 10/29/2021  CODE: No CPR- Do NOT Intubate    Reason for Consult: Acute on chronic respiratory failure    Assessment/Plan:     89 year old man with history of COPD/emphysema, followed by Dr. 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 Previously followed by our service and s/p bilateral thora consistent with transudates/CHF. Has been followed by nephro, cardiology, our service had signed off. Re-consulted due to worsening respiratory status and acute on chronic hypoxemic hypercapnic respiratory failure.     In the interim has had ongoing issues with managing fluid status - worsening renal function and hypotension with diuresis, and worsening respiratory status with fluids. As outlined in renal note will be a challenge to maintain euvolemia, narrow therapeutic window given aortic stenosis and pulmonary hypertension. Limitations in care set in that deferring cardiac ischemic eval, and not an HD candidate.    He is now DNR/DNI after house staff discussion with patient's daughters.       Recommendations:    Start bipap, tolerating well, tidal volumes ~800    Repeat ABG at 8:00 am    Bps still soft, hold on fluids or lasix    CXR with new infiltrate - started zosyn, check procal. This may all be fluid related, WBC difficult to interpret, and known dysphagia, risk for aspiration    NPO pending GOC discussions, bipap need    Palliative consult placed. Trial of bipap is reasonable, would not recommend escalating aggressiveness of care beyond that, an ICU course is unlikely to change outcome. If bounces back, consideration of hospice seems appropriate given age, frailty and  "degree of co-morbidities.       Please follow up with our service pending ABG result.     Shira Grier MD  Pulmonary and Critical Care Medicine  [facility] [place]  Office: [phone]    ------------------------------    CCx: Shortness of breath    HPI:   89 year old man with history of COPD/emphysema, followed by Dr. 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 Previously followed by our service and s/p bilateral thora consistent with transudates/CHF. Has been followed by nephro, cardiology, our service had signed off.    Notified tonight of increase in O2 needs and ABG with acute CO2 retention. Currently on 15 L oxymask. This occurred in the setting of receiving IVF bolus. This has been ongoing - requiring IV fluids for hypotension followed by worsening respiratory status and is now net +2.5 liters. Currently has JERED on CKD and per last renal note, not HD candidate, recommended palliative involvement due to poor prognosis.      Exam/Data:     Vitals  /51 (BP Location: Right arm)   Pulse 70   Temp 98.2  F (36.8  C) (Axillary)   Resp 18   Ht 1.88 m (6' 2\")   Wt 93.7 kg (206 lb 9.6 oz)   SpO2 (!) 85%   BMI 26.53 kg/m       I/O last 3 completed shifts:  In: 962.5 [P.O.:150; I.V.:562.5; IV Piggyback:250]  Out: 100 [Urine:100]  Weight change:     EXAM:  /51 (BP Location: Right arm)   Pulse 70   Temp 98.2  F (36.8  C) (Axillary)   Resp 18   Ht 1.88 m (6' 2\")   Wt 93.7 kg (206 lb 9.6 oz)   SpO2 (!) 85%   BMI 26.53 kg/m      Intake/Output last 3 shifts:  I/O last 3 completed shifts:  In: 962.5 [P.O.:150; I.V.:562.5; IV Piggyback:250]  Out: 100 [Urine:100]  Intake/Output this shift:  I/O this shift:  In: 501 " [IV Piggyback:501]  Out: -     Physical Exam  Gen: Lethargic, awakes and follows commands  HEENT: NT, no ARMIDA  CV: RRR, no m/g/r  Resp: Coarse, no wheezing, no crackles  Abd: soft, nontender, BS+  Skin: no rashes or lesions  Ext: warm, trace edema  Neuro: PERRL, nonfocal exam    DATA  All laboratory and radiology has been personally reviewed by myself today.  No am labs yet    7.24/66/65    IMAGING:   Personally reviewed and interpreted    CXR:  New focal area of opacity involving the right upper lobe with areas of patchy opacity involving in both lower lungs. These findings can be seen with pneumonia in the proper clinical setting. Mild cardiac enlargement with pulmonary vascular   congestion indicating component of CHF/volume overload. Left-sided cardiac pacemaker. Aortic calcification.    Echo:  Definity contrast utilized  The left ventricle is mildly dilated.  There is mild to moderate concentric left ventricular hypertrophy.  LV systolic function appears lower limits of normal  The visual ejection fraction is 50-55%.  Septal wall motion abnormality may reflect pacemaker activation.  TAPSE is normal, which is consistent with normal right ventricular systolic  function.  Pacemaker lead in the right heart  There is mild (1+) tricuspid regurgitation.  Estimate of RV systolic pressure is elevated at 54mmhg plus right atrial  pressure  Mild to moderate valvular aortic stenosis.Peak velocity 2.6 m/s,mean gradient  14mmhg.2D visualization of the aortic valve suggests the potential for more  significant aortic stenosis then calculated by doppler examination  There is mild (1+) aortic regurgitation.  The aortic root is moderately dilated.Measures approxmately 4.6cm  Mild to moderate enlargement of the proximal ascending aorta measures  approximately 4.4cm  Consider CT,GABE or MRI (if pacer compatible) to further define the aortic  valve and thoracic aorta  No prior study available for comparison

## 2021-11-08 NOTE — PROGRESS NOTES
Daily Progress Note  Addendum  --Received page from patient's RN stating systolic blood pressure in the 80s.  Patient seen and examined again, on the BiPAP, awake, denied difficulty breathing or other symptoms.  --Discussed with pulmonologist/intensivist, they do not recommend escalating care like transferring to ICU is unlikely to change outcome.  Discussed with patient's daughter at bedside in detail again, discussed specialist recommendations.  Recommended continue current management on the floor with BiPAP, midodrine, IV fluid and not escalating care.  Daughter reported that she will talk to her sister and brother.  --Continue IV fluid per nephrology recommendation.  Discussed with patient's nurse.    CODE STATUS:  No CPR- Do NOT Intubate    11/02/21  Assessment/Plan:  Efe Huertas is a 89 year old old male with past medical history of atrial fibrillation, COPD on 2 L nasal cannula, JANETH on CPAP, CHF, myeloproliferative disorder who presented to ED for evaluation of generalized malaise, fatigue, poor appetite and admitted for further management.    Acute on chronic respiratory failure with hypoxia; likely multifactorial-possible pneumonia, acute CHF with preserved EF, COPD exacerbation, pleural effusion, physical deconditioning.    Patient with history of COPD on 2-3 L nasal cannula at home and JANETH on CPAP but noncompliant  --On admission, CT  A/P reported bilateral atelectasis or infiltrate and small pleural effusions.  --On 11/1, CXR reported findings consistent with CHF  --On admission normal procalcitonin and normal lactic acid.  --On 11/1 night required BiPAP for worsening respiratory status.  --S/p right thoracocentesis on 11/2 and left thoracocentesis on 11/3 with removal of 900 cc from each side  --Patient initiated on DuoNeb, titrate.  IV Solu-Medrol changed to oral prednisone on 11/4, completed course.   --Completed 7 days course of Levaquin on 11/6.  Of note, normal procalcitonin on  admission  --Received IV Lasix 40 mg every 6 hour and also dose of metolazone on 11/3 per cardiology, now creatinine trending up, Lasix discontinued.  --Appreciated cardiology and pulmonary input.  Discussed with pulmonologist, Dr Christina reported pleural fluid analysis so far fairly unremarkable and can follow-up with him as an outpatient and signed off.    On 11/7 night, low blood pressure, received IV fluid bolus then had respiratory issues.  Appreciated house officer and pulmonary consult, placed on a BiPAP, CXR with new infiltrates, concerning for aspiration pneumonia, also CHF findings  Continue BiPAP, recheck procalcitonin slightly elevated, started on IV Zosyn.    Acute kidney injury; likely multifactorial  --Home Celebrex on hold  --Admission CT abdomen/pelvis negative for hydronephrosis  --On admission, received IV fluid with improvement on creatinine but concern for CHF and initiated on IV diuretics, creatinine trending up.  Did received a schedule IV Lasix and also dose of metolazone on 11/3.  IV diuretics discontinued on 11/6 and received IV fluid until 11/7.    --Creatinine is still trending up.  Appreciated nephrology input, reported patient not a candidate for dialysis  --Monitor labs, intake/output, weight closely    Dysphagia, likely due to stricture;  Possible rectal wall thickening on CT, possible proctitis.  However, no complaint of abdominal pain.  --On 11/5 EGD reportedly benign-appearing esophageal stenosis status post dilatation, recommended omeprazole 20 mg daily indefinitely.  Also esophageal ulcer with no stigmata of recent bleeding.  Biopsy pending, defer to GI  --On 11/5, flexible sigmoidoscopy unremarkable  --Appreciated GI input and they signed off.    Myeloproliferative disorder;  Microcytic anemia;  Leukocytosis; worsening-now trending down on 11/7  --Appreciated oncology input, reported MDS versus myelofibrosis, may need bone marrow biopsy to clarify in future.  --WBC elevated on  admission, seems elevated since 5/2021.  However, significantly worsening which seems correlating with initiation of IV steroid.  Of note, patient afebrile, UC no growth, BC no growth.  Respiratory panel PCR negative.  --On 11/5, discussed with oncology team, reported leukemoid reaction they increased hydroxyurea from 500 to 1000 mg.  WBC count trending down, monitor.    Copied from oncologist note dated 11/4.  Patient and son seen at bedside.  Discussed the possibility of further work-up depending on peripheral smear.  I indicated that that work-up would Include bone marrow biopsy, however we would complete this in outpatient setting.  We then discussed what would ensue following possible findings of malignancy which would include chemotherapy.  Both son and patient are leaning towards not wanting to undergo treatment if malignancy is identified, therefore they would likely forego bone marrow biopsy.    H/o Afib;  --has ICD in place  --not anticoagulated due to h/o multiple falls in the past per cardiology note.     Nonsustained ventricular tachycardia on device check per cardiology;  --Appreciated cardiology input, reported no ischemic evaluation at this time, also reported previously weaned off beta-blocker due to lightheadedness and recommended continue to hold at this time.    Severe protein malnutrition per RD;  --Continue supplements.    DVT prophylaxis; subcu heparin      Disposition; anticipate TCU  Barrier to discharge; multiple medical issues, refer above     LOS: 2 days     Subjective:  Interval History: Patient seen and examined. Notes, labs, imaging reports personally reviewed.  Overnight had low blood pressure, received IV fluid bolus after that respiratory distress.  Appreciated house officer and pulmonary input.  During my visit this morning, patient was alert, reported feeling better, breathing better.  Patient is hard of hearing and difficult to get detailed information.  Discussed with patient's  daughter at bedside.   Discussed with pulmonologist and nephrologist, recommended palliative care approach.  Discussed with palliative care in detail.  Discussed with nursing staffs.     Review of Systems:   As mentioned in subjective.    Patient Active Problem List   Diagnosis     A-fib (H)     NICM (nonischemic cardiomyopathy) (H)     CHB (complete heart block) (H)     Colovesical fistula     Congestive heart failure (H)     Encounter for servicing of implantable cardioverter-defibrillator (ICD) at end of battery life     Biventricular ICD (implantable cardioverter-defibrillator) in place     Obstructive airway disease (H)     Dizziness     Pre-syncope     Ventricular bigeminy     Constipation     Pneumonia of both lungs due to infectious organism, unspecified part of lung     Chronic respiratory failure (H)     COPD, group B, 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 (H)     Episodic mood disorder (H)     Left foot drop     Mobitz type II atrioventricular block     Leukocytosis, unspecified type     JERED (acute kidney injury) (H)     Proctitis     Myeloproliferative disorder (H)     Splenomegaly     Prostate cancer (H)     Polycythemia     JANETH (obstructive sleep apnea)     Acute on chronic respiratory failure with hypoxia (H)       Scheduled Meds:    heparin ANTICOAGULANT  5,000 Units Subcutaneous Q12H     hydroxyurea  1,000 mg Oral Daily     ipratropium - albuterol 0.5 mg/2.5 mg/3 mL  3 mL Nebulization 4x daily     lactobacillus rhamnosus (GG)  1 capsule Oral BID     mirtazapine  3.75 mg Oral At Bedtime     multivitamin, therapeutic  1 tablet Oral Daily     pantoprazole  40 mg Oral QAM AC     piperacillin-tazobactam  3.375 g Intravenous Q8H     polyethylene glycol  17 g Oral Daily     senna-docusate  1 tablet Oral Daily     Continuous Infusions:    PRN Meds:.acetaminophen, albuterol, alum & mag hydroxide-simethicone, benzonatate, bisacodyl, bisacodyl, diclofenac, magnesium hydroxide, melatonin, naloxone, naloxone, naloxone,  naloxone, ondansetron, senna-docusate, [COMPLETED] sodium phosphate **FOLLOWED BY** sodium phosphate, sodium phosphate    Objective:  Vital signs in last 24 hours:  Temp:  [96.8  F (36  C)-98.5  F (36.9  C)] 96.8  F (36  C)  Pulse:  [70] 70  Resp:  [16-24] 24  BP: ()/(41-51) 108/50  FiO2 (%):  [7 %-60 %] 40 %  SpO2:  [85 %-99 %] 92 %      Intake/Output Summary (Last 24 hours) at 11/2/2021 0810  Last data filed at 11/1/2021 1800  Gross per 24 hour   Intake 140 ml   Output 400 ml   Net -260 ml       Physical Exam:  General: Not in obvious distress.  HEENT: Normocephalic, normocephalic  Chest: Decreased but clear to auscultation bilateral anteriorly, no wheezing, unlabored breathing  Heart: S1S2 normal, regular  Abdomen: Soft. NT, ND. Bowel sounds- active.  Neuro: Awake, follows simple commands, hard of hearing, moves all extremities    Lab Results:(I have personally reviewed the results)    Recent Results (from the past 24 hour(s))   Lactic Acid STAT    Collection Time: 11/08/21 12:08 AM   Result Value Ref Range    Lactic Acid 1.3 0.7 - 2.0 mmol/L   Blood gas arterial    Collection Time: 11/08/21  4:44 AM   Result Value Ref Range    pH Arterial 7.24 (LL) 7.37 - 7.44    pCO2 Arterial 66 (H) 35 - 45 mm Hg    pO2 Arterial 65 (L) 75 - 85 mm Hg    Bicarbonate Arterial 25 23 - 29 mmol/L    O2 Sat, Arterial 92.4 (L) 95.0 - 96.0 %    Oxyhemoglobin 90.5 (L) 95.0 - 96.0 %    Base Excess/Deficit (+/-) 0.9   mmol/L    Ventilation Mode oxymask     Flow 15.0 LPM    Sample Stabilized Temperature 37.0 degrees C   CBC with platelets    Collection Time: 11/08/21  5:48 AM   Result Value Ref Range    WBC Count 36.5 (H) 4.0 - 11.0 10e3/uL    RBC Count 2.30 (L) 4.40 - 5.90 10e6/uL    Hemoglobin 7.6 (L) 13.3 - 17.7 g/dL    Hematocrit 26.2 (L) 40.0 - 53.0 %     (H) 78 - 100 fL    MCH 33.0 26.5 - 33.0 pg    MCHC 29.0 (L) 31.5 - 36.5 g/dL    RDW 17.0 (H) 10.0 - 15.0 %    Platelet Count 355 150 - 450 10e3/uL   Basic metabolic panel     Collection Time: 11/08/21  5:48 AM   Result Value Ref Range    Sodium 142 136 - 145 mmol/L    Potassium 4.5 3.5 - 5.0 mmol/L    Chloride 107 98 - 107 mmol/L    Carbon Dioxide (CO2) 29 22 - 31 mmol/L    Anion Gap 6 5 - 18 mmol/L    Urea Nitrogen 66 (H) 8 - 28 mg/dL    Creatinine 2.75 (H) 0.70 - 1.30 mg/dL    Calcium 9.3 8.5 - 10.5 mg/dL    Glucose 95 70 - 125 mg/dL    GFR Estimate 19 (L) >60 mL/min/1.73m2   Procalcitonin    Collection Time: 11/08/21  5:48 AM   Result Value Ref Range    Procalcitonin 0.50 (H) 0.00 - 0.49 ng/mL   B-Type Natriuretic Peptide ([facility] Only)    Collection Time: 11/08/21  5:48 AM   Result Value Ref Range     (H) 0 - 93 pg/mL   Blood gas arterial    Collection Time: 11/08/21  8:03 AM   Result Value Ref Range    pH Arterial 7.30 (L) 7.37 - 7.44    pCO2 Arterial 57 (H) 35 - 45 mm Hg    pO2 Arterial 59 (L) 75 - 85 mm Hg    Bicarbonate Arterial 25 23 - 29 mmol/L    O2 Sat, Arterial 91.3 (L) 95.0 - 96.0 %    Oxyhemoglobin 89.7 (L) 95.0 - 96.0 %    Base Excess/Deficit (+/-) 1.3   mmol/L    Ventilation Mode Oxymask     Flow 7.0 LPM    Sample Stabilized Temperature 37.0 degrees C         Serum Glucose range:   Recent Labs   Lab 11/08/21  0548 11/07/21  0543 11/06/21  1647 11/06/21  0631   GLC 95 114 134* 86     ABG:   Recent Labs   Lab 11/08/21  0803 11/08/21  0444 11/01/21  2308   PH 7.30* 7.24* 7.28*   PCO2 57* 66* 55*   PO2 59* 65* 58*   HCO3 25 25 23   O2PER  --   --  50     CBC:   Recent Labs   Lab 11/08/21  0548 11/07/21  0543 11/06/21  0610 11/05/21  0644 11/04/21  1322 11/04/21  0559   WBC 36.5* 46.7* 60.6*   < > 56.5* 47.6*   HGB 7.6* 7.9* 8.5*   < > 8.4* 8.0*   HCT 26.2* 27.4* 29.2*   < > 28.2* 26.6*   * 115* 114*   < > 110* 111*    432 533*   < > 545* 549*   NEUTROPHIL  --   --   --   --  98 97   LYMPH  --   --   --   --  0 1   MONOCYTE  --   --   --   --  2 2   EOSINOPHIL  --   --   --   --  0 0    < > = values in this interval not displayed.     Chemistry:    Recent Labs   Lab 21  0548 21  0543 21  1647 21  0610 21  0644 21  0559 21  0553 21  1641 21  0527    143 144   < > 140 142   < >  --  144   POTASSIUM 4.5 4.2 4.2   < > 4.6 4.6   < >  --  5.2*   CHLORIDE 107 107 107   < > 106 106   < >  --  109*   CO2 29 27 26   < > 25 27   < >  --  27   BUN 66* 61* 62*   < > 51* 43*   < >  --  28   CR 2.75* 2.34* 2.43*   < > 1.96* 1.85*   < >  --  1.51*   GFRESTIMATED 19* 23* 22*   < > 29* 31*   < >  --  39*   CIRA 9.3 9.2 9.1   < > 9.7 9.8   < >  --  8.9   PROTTOTAL  --   --   --   --   --   --   --  5.9* 5.4*   ALBUMIN  --   --   --   --  3.2* 3.2*  --   --  3.2*   AST  --   --   --   --   --   --   --   --  21   ALT  --   --   --   --   --   --   --   --  <9   ALKPHOS  --   --   --   --   --   --   --   --  142*   BILITOTAL  --   --   --   --   --   --   --   --  0.5    < > = values in this interval not displayed.     Coags:  No results for input(s): INR, PROTIME, PTT in the last 168 hours.    Invalid input(s): APTT  Cardiac Markers:  No results for input(s): CKTOTAL, TROPONINI in the last 168 hours.     Chest XR,  PA & LAT    Result Date: 10/30/2021  EXAM: XR CHEST 2 VW LOCATION: [facility] DATE/TIME: 10/30/2021 12:01 AM INDICATION: fatigue COMPARISON: 2019     IMPRESSION: Heart size prominent on this AP view but unchanged. Pulmonary vascularity normal. Subsegmental atelectasis or scarring in the bases. Small bilateral pleural effusions posteriorly in the costophrenic angles. Upper lung fields are clear. Pacemaker lead tips right atrium and right ventricle. Clips upper abdomen.    Echocardiogram Complete    Result Date: 10/30/2021  [id number]^[email or web address]^[place] [address]  Name: GEE OGDEN MRN: [id number] : 1928 Study Date: 10/30/2021 10:42 AM Age: 89 yrs Gender: Male Patient Location: [facility] Reason For  Study: Chest Discomfort Ordering Physician: KAIDEN LUCAS Performed By: JUANA  BSA: 2.2 m2 Height: 74 in Weight: 203 lb HR: 78 ______________________________________________________________________________ Procedure Definity ([facility]C #03715-943) given intravenously. ______________________________________________________________________________ Interpretation Summary  Definity contrast utilized The left ventricle is mildly dilated. There is mild to moderate concentric left ventricular hypertrophy. LV systolic function appears lower limits of normal The visual ejection fraction is 50-55%. Septal wall motion abnormality may reflect pacemaker activation. TAPSE is normal, which is consistent with normal right ventricular systolic function. Pacemaker lead in the right heart There is mild (1+) tricuspid regurgitation. Estimate of RV systolic pressure is elevated at 54mmhg plus right atrial pressure Mild to moderate valvular aortic stenosis.Peak velocity 2.6 m/s,mean gradient 14mmhg.2D visualization of the aortic valve suggests the potential for more significant aortic stenosis then calculated by doppler examination There is mild (1+) aortic regurgitation. The aortic root is moderately dilated.Measures approxmately 4.6cm Mild to moderate enlargement of the proximal ascending aorta measures approximately 4.4cm Consider CT,GABE or MRI (if pacer compatible) to further define the aortic valve and thoracic aorta No prior study available for comparison ______________________________________________________________________________ Left Ventricle The left ventricle is mildly dilated. There is mild to moderate concentric left ventricular hypertrophy. Diastolic Doppler findings (E/E' ratio and/or other parameters) suggest left ventricular filling pressures are normal. The visual ejection fraction is 50-55%. LV systolic function appears lower limits of normal. Septal wall motion abnormality may reflect pacemaker activation.  Right  Ventricle The right ventricle is not well visualized. The right ventricle is mildly dilated. TAPSE is normal, which is consistent with normal right ventricular systolic function. The right ventricular systolic function is normal. There is a pacemaker lead in the right ventricle.  Atria The left atrium is mild to moderately dilated. Pacer wire in right atrium. Right atrium not well visualized. The right atrium is mildly dilated.  Mitral Valve The mitral valve leaflets appear thickened, but open well. There is moderate mitral annular calcification. There is mild (1+) mitral regurgitation.  Tricuspid Valve There is mild (1+) tricuspid regurgitation. Moderate (46-55mmHg) pulmonary hypertension is present. The right ventricular systolic pressure is approximated at 53.5 mmHg plus the right atrial pressure.  Aortic Valve Moderate to severe aortic valve leaflet calcification. There is mild (1+) aortic regurgitation. Mild to moderate valvular aortic stenosis.  Pulmonic Valve There is mild (1+) pulmonic valvular regurgitation.  Vessels The aortic root is moderately dilated.Measures approxmately 4.6cm. Mild to moderate enlargement of the proximal ascending aorta measures approximately 4.4cm. IVC diameter <2.1 cm collapsing >50% with sniff suggests a normal RA pressure of 3 mmHg.  Pericardium Trivial pericardial effusion.  ______________________________________________________________________________ MMode/2D Measurements & Calculations IVSd: 1.4 cm LVIDd: 6.9 cm LVIDs: 4.0 cm LVPWd: 1.4 cm  FS: 42.1 % LV mass(C)d: 479.8 grams LV mass(C)dI: 219.4 grams/m2 Ao root diam: 4.6 cm LA dimension: 4.6 cm asc Aorta Diam: 4.7 cm LA/Ao: 1.0 LVOT diam: 3.0 cm LVOT area: 7.2 cm2 RWT: 0.40  Time Measurements MM HR: 74.0 BPM  Doppler Measurements & Calculations MV E max luzmaria: 67.3 cm/sec MV A max luzmaria: 90.3 cm/sec MV E/A: 0.75 MV dec slope: 326.6 cm/sec2 MV dec time: 0.21 sec Ao V2 max: 232.4 cm/sec Ao max P.0 mmHg Ao V2 mean: 177.2 cm/sec  Ao mean P.3 mmHg Ao V2 VTI: 54.3 cm DEEPIKA(I,D): 2.5 cm2 DEEPIKA(V,D): 3.2 cm2 AI P1/2t: 518.4 msec LV V1 max P.2 mmHg LV V1 max: 102.3 cm/sec LV V1 VTI: 19.1 cm  SV(LVOT): 137.7 ml SI(LVOT): 63.0 ml/m2 PA acc time: 0.07 sec PI end-d indio: 132.7 cm/sec TR max indio: 365.9 cm/sec TR max P.5 mmHg AV Indoi Ratio (DI): 0.44 DEEPIKA Index (cm2/m2): 1.2 E/E' av.9 Lateral E/e': 6.7 Medial E/e': 11.0  ______________________________________________________________________________ Report approved by: Trev Jason 10/30/2021 12:53 PM       XR Chest Port 1 View    Result Date: 2021  EXAM: XR CHEST PORT 1 VIEW LOCATION: [facility] DATE/TIME: 2021 11:10 AM INDICATION: persistent hypoxia COMPARISON: 10/29/2021     IMPRESSION: Stable position multilead left subclavian pacemaker. Stable heart size. There is cephalization of blood flow, and small pleural effusions, increased from previous. These findings suggest CHF. Also, there is new left mid to inferior chest opacity which could represent asymmetric pulmonary edema, or pneumonia.    CT Abdomen Pelvis w Contrast  Result Date: 10/30/2021  IMPRESSION: 1.  Bibasilar atelectasis or infiltrate and small pleural effusions. 2.  Splenomegaly. 3.  The wall thickening not excluded. Correlate for proctitis. 4.  Diverticulosis. 5.  Remainder similar to previous.    CT Head w/o Contrast  Result Date: 10/31/2021  IMPRESSION: 1.  No CT evidence for acute intracranial process. 2.  Brain atrophy and presumed chronic microvascular ischemic changes as above. 3.  No change.      Latest radiology report personally reviewed.    Note created using dragon voice recognition software so sounds alike errors may have escaped editing.    2021   SADE RAMSEY MD  HOSPITALIST, [facility]  PAGER NO. [phone]

## 2021-11-08 NOTE — PLAN OF CARE
Problem: Adult Inpatient Plan of Care  Goal: Absence of Hospital-Acquired Illness or Injury  Intervention: Identify and Manage Fall Risk  Recent Flowsheet Documentation  Taken 11/8/2021 0100 by Juan Bonilla RN  Safety Promotion/Fall Prevention:    activity supervised    bed alarm on     Problem: Respiratory Compromise COPD (Chronic Obstructive Pulmonary Disease)  Goal: Effective Oxygenation and Ventilation  Outcome: Declining  Intervention: Promote Airway Secretion Clearance  Recent Flowsheet Documentation  Taken 11/8/2021 0100 by Juan Bonilla RN  Cough And Deep Breathing: done independently per patient     At midnight patient's BP was 86/41, notified house officer. Ordered a bolus of 500 ml NS. At 0216, BP was 101/51. Patient's oxygen needs progressively increase post infusion. Oxygen saturation was 85% on 15 lpm via oxymask. Notified house officer, evaluated by Dr. Ochoa. Ordered Arterial Blood gases and chest xray. Blood gases was pH= 7.24.  X-ray IMPRESSION: New focal area of opacity involving the right upper lobe with areas of patchy opacity involving in both lower lungs. These findings can be seen with pneumonia in the proper clinical setting. Mild cardiac enlargement with pulmonary vascular   congestion indicating component of CHF/volume overload.   Continuous BI Pap ordered.   MD had discussion with patient and patient's daughters, changed code status to DNR/DNI.   Palliative Care consult placed.    Juan Bonilla RN

## 2021-11-08 NOTE — SIGNIFICANT EVENT
Significant Event Note    Time of event: 4:00 AM    Description of event:  Patient is here with acute on chronic respiratory failure and acute kidney injury. Experienced a low blood pressure earlier this morning of 86/41; gave a 500 ml bolus at that time due to heart failure. Received a call at 4:00 AM that patient quickly escalated from 10L to 15L oxymask at was sating at 85%. Upon arriving at the room, patient was maintaining his sats at 91-92% on 15L oxymask. He was able to respond to our questions, and did acknowledge that he was having a harder time breathing. Denied any chest pain. On exam, his heart sounded regular rate and rhythm, and lungs were clear. He did show increased work of breathing. While his hands appear puffy, his legs did not show edema. Patient was willing to undergo whatever tests necessary to find out what was going on.     Plan:  Patient had an acute worsening of respiratory status. While this occurred after receiving the bolus for blood pressure, his lungs sound clear at this time. Lactate was drawn at midnight at was 1.3. Will evaluate with a CXR and ABG at this time. Plan to speak to family about goals of care.    Rosanna Swenson MD        Addendum, 545 AM:  Discussed situation at length with Efe at bedside.  He was able to tell me symptoms as above.  However, did not seem he was completely understanding the gravity of his medical situation.  I felt his GCS was 14 - mildly somnolent but protecting his airway. I did not feel he needed to be intubated urgently.   I did not feel he had the capacity to give me a firm answer as to immediate goals of care.  I asked him if it was okay for me to call his daughters and ask them their opinion on what to do.  He said yes.  In the interim, his ABG came back showing pH 7.24//Bicarb 25, consistent with uncompensated acute respiratory acidosis.  May be a component of volume overload (HF), though also suspect retention secondary to  "JANETH/nonadherence to CPAP vs pneumonia vs other pulmonary etiology.     I proceeded to call Melanie and Iliana.  Discussed Eef's medical situation overall and the acute change overnight at length, as above.  Emphasized that there is a reasonable chance that he will improve throughout the day, especially if we give him BiPAP now.  They were in agreement with starting BiPAP now.  Order was placed, and I confirmed that RT started the BiPAP -patient appeared more comfortable and was satting in mid 90s.  I then emphasized to Melanie Barrientos that he is elderly with quite complex medical disease and has a poor prognosis per day team's assessment.  I discussed that it is very reasonable that his respiratory status worsens to the point where he would medically require intubation should that be desired.  I discussed the benefits and risks of this, especially in the context of \"no CPR\" and not a good dialysis candidate per nephro.  Questions were answered.  Ultimately, Melanie and Iliana decided that it would be best to transition to full DNR/DNI status.  They plan to revisit today with day team, and particularly with palliative care.      Dr. Swenson paged Dr. Grier of the intensivist service given that he had been \"pre arrest intubation okay\". Looks as if she put in labs, including BNP, procal and for a palliative consult. Appreciate.       6:08 AM  Read back in on CXR:   IMPRESSION: New focal area of opacity involving the right upper lobe with areas of patchy opacity involving in both lower lungs. These findings can be seen with pneumonia in the proper clinical setting. Mild cardiac enlargement with pulmonary vascular   congestion indicating component of CHF/volume overload. Left-sided cardiac pacemaker. Aortic calcification.   - He is already on Zosyn. Concern for adding Vanc or other broad spectrum abx given renal function. Will hold on adding further at this time.   - Possibility of Lasix but will hold for now given " improvement on BiPAP and poor renal function.  BMP pending.  - Discussed with RN who will notify day team early, including palliative. Appreciate.   - Call with further concerns.     6:48 AM  I see Dr. Grier assessed the patient and is following, appreciate.

## 2021-11-09 NOTE — PLAN OF CARE
Occupational Therapy Discharge Summary    Reason for therapy discharge:    No further expectations of functional progress.    Progress towards therapy goal(s). See goals on Care Plan in [facility] electronic health record for goal details.  Comfort cares    Therapy recommendation(s):    No further therapy is recommended.  Mary Reyes, OTR/L  11/9/2021

## 2021-11-09 NOTE — PLAN OF CARE
Problem: Adult Inpatient Plan of Care  Goal: Plan of Care Review  Outcome: Change based on patient need/priority   Plan of care, meds and treatments explained to the patient and his family. All verbalize understanding of the plan of care.    Problem: Adult Inpatient Plan of Care  Goal: Optimal Comfort and Wellbeing  Outcome: Change based on patient need/priority     Patient placed on comfort cares. Repositioned for comfort. Tylenol given for some lower back pain. Family in the room . Patient states he is comfortable at this time. Continue to assess and treat prn. Cluster cares to promote a healing environment

## 2021-11-09 NOTE — PLAN OF CARE
Problem: Adult Inpatient Plan of Care  Goal: Plan of Care Review  Outcome: No Change     Patient continues to have hypotension. At 2203, blood pressure was 84/49. Contacted house officer. Ok with administering PRN midorine early. Turned and repositioned. Incontinent of small bowel movement and small amount of urine. At 2100, removed Bi Pap to give patient break from mask. RT approached patient to place Bi Pap back on at 2200. Patient refused at this time. Oxygen saturation at 93% on oxymask at 8 lpm.     Juan Bonilla RN

## 2021-11-09 NOTE — TREATMENT PLAN
RCAT Treatment Plan    Patient Score: 17  Patient Acuity: 2    Clinical Indication for Therapy: history of bronchospasm and unable to deep breath    Therapy Ordered: duo nebs 4x/ day, oxygen    Assessment Summary: duo nebs given as scheduled, sats 92% on 6 oxymaskL, breath sounds diminished  Pt is now comfort cares, bipap and oximetery dc'ignacio Hobbs, RT  11/9/2021

## 2021-11-09 NOTE — PLAN OF CARE
Speech Language Therapy Discharge Summary    Reason for therapy discharge:    Change in medical status.  Patient now comfort measures only.    Progress towards therapy goal(s). See goals on Care Plan in [facility] electronic health record for goal details.  Goals partially met.  Barriers to achieving goals:   comfort measures only.    Therapy recommendation(s):    No further therapy is recommended.

## 2021-11-09 NOTE — PROGRESS NOTES
Pt's BP was soft, 88/45, notified Dr RECIO.  Midrodrine was ordered, administered once it arrived from pharmacy.  Dr. Pleitez also notified, she recommended NS, 75 ml/hr for 3 hours, turn off fluids if hypoxia worsens.  Pt receiving fluids, did take midrodrine, and at 18:30, BP was 100/57.  IV Fluids will be turned off at 19:30; pt remains on Bipap, turned down to 28% FiO2, sats remain 92-93%.

## 2021-11-09 NOTE — PROGRESS NOTES
RENAL PROGRESS NOTE     CC: Acute kidney injury    ROS: on face mask oxygen, denies sob, very weak, ongoing hypotension noted overnight.  Assessment and Plan:     1. Acute kidney injury - Acute Tubular Necrosis.  Acute tubular necrosis secondary to infection vs. intravascular depletion from diuresis of edema and heart failure.      Baseline creatinine 0.78 (5/19/2021)    Random urine sodium 66, urine osmolality 403 - C/w Acute Tubular Necrosis (11/4)    Worse again today, with advanced age and comorbidities, expect renal function may continue to worsen  He is NOT a dialysis candidate even short term due to age and co-morbids and it would not improve his quality of life  -treatment options to improve renal function very limited and his resp status very tenuous, agree with other physicians that comfort care would be very appropriate    2. Acute on chronic respiratory failure with hypoxia.   underlying chronic obstructive pulmonary disease on home oxygen, presented with findings consistent with congestive heart failure  -JERED after diuresis  -poorly tolerant of bipap, has ongoing co2 retention  -would favor continuing to hold diuretics, if worsening hypoxia, can redose diuretics with understanding that may precipitate further JERED  -as above, with multiorgan failure, expect approaching limits of medical management, agree with plan for palliative care, would certainly support comfort care now . ..   3. Chronic Diastolic heart failure.  Left ventricular ejection fraction 50-55%.  Left ventricular hypertrophy present.  Patient has pulmonary hypertension as well which contributes to intolerance of diuresis   4. 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  Chronic anemia.  Known splenomegaly.  Continue management as per [facility].  5. Hypotension - ongoing, likely poorly tolerant of further ivf/albumin with his tenuous resp status. Treatment options quite  "limited.  6. Disp-Very poor prognosis. Is DNR. Agree with plan for further goals of care discussion.  Comfort care would be very reasonable.         Marianne Pleitez MD  [facility]  [phone]  PHYSICAL EXAM  BP 99/59 (BP Location: Right arm)   Pulse 70   Temp 98  F (36.7  C) (Axillary)   Resp 26   Ht 1.88 m (6' 2\")   Wt 93.7 kg (206 lb 9.6 oz)   SpO2 96%   BMI 26.53 kg/m        Wt Readings from Last 3 Encounters:   11/04/21 93.7 kg (206 lb 9.6 oz)   09/09/19 101.3 kg (223 lb 6.4 oz)   10/11/19 102.8 kg (226 lb 9.6 oz)       GENERAL: Chronically ill and debilitated. FM O2 on  HEAD: Normal  HEENT: no scleral icterus, oral mucous membranes dry  CARDIOVASCULAR: No JVD present.  Trace diffuse edema  PULMONARY: dec bs b/l, mild tachypnea at rest  GASTROINTESTINAL: Nt/nd  MSK: Diffuse muscle wasting, no joint deformities  NEURO: awake but slow to respond, diffusely weak  SKIN: Pale, no jaundice, scattered brusing    LABORATORIES  Recent Labs   Lab 11/09/21  0540 11/08/21  0548 11/07/21  0543   WBC 37.4* 36.5* 46.7*   HGB 7.3* 7.6* 7.9*   HCT 25.0* 26.2* 27.4*    355 432     Recent Labs   Lab 11/09/21  0540 11/08/21  0548 11/07/21  0543    142 143   CO2 27 29 27   BUN 72* 66* 61*       RADIOLOGY REPORTS    ECHOCARDIOGRAM  Cardiac echo 10/30/2021:  Interpretation Summary     Definity contrast utilized  The left ventricle is mildly dilated.  There is mild to moderate concentric left ventricular hypertrophy.  LV systolic function appears lower limits of normal  The visual ejection fraction is 50-55%.  Septal wall motion abnormality may reflect pacemaker activation.  TAPSE is normal, which is consistent with normal right ventricular systolic  function.  Pacemaker lead in the right heart  There is mild (1+) tricuspid regurgitation.  Estimate of RV systolic pressure is elevated at 54mmhg plus right atrial  pressure  Mild to moderate valvular aortic stenosis.Peak velocity 2.6 m/s,mean " gradient  14mmhg.2D visualization of the aortic valve suggests the potential for more  significant aortic stenosis then calculated by doppler examination  There is mild (1+) aortic regurgitation.  The aortic root is moderately dilated.Measures approxmately 4.6cm  Mild to moderate enlargement of the proximal ascending aorta measures  approximately 4.4cm  Consider CT,GABE or MRI (if pacer compatible) to further define the aortic  valve and thoracic aorta  No prior study available for comparison          MEDICATIONS    heparin ANTICOAGULANT  5,000 Units Subcutaneous Q12H     hydroxyurea  1,000 mg Oral Daily     ipratropium - albuterol 0.5 mg/2.5 mg/3 mL  3 mL Nebulization 4x daily     lactobacillus rhamnosus (GG)  1 capsule Oral BID     mirtazapine  3.75 mg Oral At Bedtime     multivitamin, therapeutic  1 tablet Oral Daily     pantoprazole  40 mg Oral QAM AC     piperacillin-tazobactam  3.375 g Intravenous Q8H     polyethylene glycol  17 g Oral Daily     senna-docusate  1 tablet Oral Daily         Above laboratories and medications were personally reviewed during evaluation today.

## 2021-11-09 NOTE — PROGRESS NOTES
Respiratory Care Note    ABG drawn this AM revealed:    Results for GEE OGDEN (MRN [id number]) as of 11/8/2021 22:03   Ref. Range 11/8/2021 08:03   pH Arterial Latest Ref Range: 7.37 - 7.44  7.30 (L)   pCO2 Arterial Latest Ref Range: 35 - 45 mm Hg 57 (H)   PO2 Arterial Latest Ref Range: 75 - 85 mm Hg 59 (L)   Bicarbonate Arterial Latest Ref Range: 23 - 29 mmol/L 25     Pt has been on and off BiPAP today. Settings:14/6 16 28%. Pt wears 2L at home. Oxymask for breaks. Tolerates BiPAP well. Nebs given. LS clear/diminished pre and post, NPC. Plan for BiPAP tonight as pt tolerates.       Tono Mart, RT

## 2021-11-09 NOTE — PLAN OF CARE
Physical Therapy Discharge Summary    Reason for therapy discharge:    Pt going on comfort cares

## 2021-11-09 NOTE — PROGRESS NOTES
[facility]    Medicine Progress Note - Hospitalist Service       Date of Admission:  10/29/2021    Assessment & Plan       89-year-old gentleman with past medical history of A. fib, COPD with chronic respiratory failure on home oxygen, obstructive sleep apnea on CPAP, MDS and CHF with preserved ejection fraction admitted to the hospital with weakness fatigue and malaise.    Acute on chronic respiratory failure  --- Suspected to be multifactorial secondary to CHF, COPD, pleural effusion.  --- Patient with normal procalcitonin, lactic acid, chest x-ray looking like CHF on admission.  --- Was on BiPAP early in hospital stay.  Now not felt to be useful to escalate care given patient's overall prognosis.  See below  --- Status post bilateral thoracenteses, right 11/2 and left 11/3  --- Patient completed course of steroids both IV and p.o. for COPD exacerbation  --- Patient completed 7-day course of levofloxacin 11/6; restarted on IV Zosyn 11/7 due to worsening respiratory status concerning for aspiration and slightly elevated procalcitonin on recheck.  Now with focus on comfort Zosyn discontinued.  --- Initially received IV diuresis and metolazone, now discontinued  --- Neurology and cardiology did follow, now signed off  --- Patient now on comfort care.      JERED  --- Continues to worsen   ---normal renal function in May  --- Admission CT negative for hydronephrosis  Previously on IV diuretics which were discontinued 11/6.  Was briefly on IV fluids until 11/7  --- Appreciate nephrology consultation.  Not a dialysis candidate  --- Nephrology agrees with comfort care approach.    MDS/worsening leukocytosis  --- [facility] has been following.  Clear MDS versus myelofibrosis.  This was discussed with patient on 11/4 in regards to further work-up and patient declined bone marrow biopsy  --- Worsening leukocytosis, unclear significance  --- hydroxyurea was increased, now has gone back  down    Dysphagia  --- Underwent EGD 11/5 showing esophageal stenosis now status post dilation.  --- Continue PPI  --- Did have flex sig 11/5 for evaluation of possible proctitis contributing to overall weakness on CT scan.  This was unremarkable.    A. Fib  --- Status post ICD  --- Not on anticoagulation due to recurrent falling    NSVT  --- Pain on ICD interrogation.  Cardiology aware.    CHF with preserved ejection fraction  --- EF 50 to 55%  --- Known pulmonary hypertension, complicating diuresis.    Malnutrition:    - Level of malnutrition: moderate   - Based on: reduced intake, mild (or greater) muscle loss, mild (or greater) fluid retention         Diet: Snacks/Supplements Adult: Ensure Enlive; Between Meals    DVT Prophylaxis: Heparin SQ  King Catheter: Not present  Central Lines: None  Code Status: No CPR- Do NOT Intubate      Disposition Plan   Expected discharge: 11/12/2021   to hospice house once this is available.  Patient is not vaccinated for Covid, this is complicating discharge planning.  The patient's care was discussed with the Bedside Nurse, Care Coordinator/, Patient and Patient's Family.    Also spent additional 15 minutes face-to-face with the family in the care conference discussing patient's acute kidney injury as well as decline as well as hospice philosophies.    Sapphire Jimenez MD  Hospitalist Service  [facility]  Text page via [facility] Paging/Directory                       ______________________________________________________________________    Interval History   Patient seen and chart reviewed.  Care discussed with palliative care as well as his 3 children during care conference earlier today.  Answered several questions regarding hospice as well as patient's renal function for family.    I had a murtaza discussion with him also about the difficulties in his hospital stay.  He has been here for 11 days.  We discussed CHF, COPD, as well as worsening  respiratory failure.  He is most anxious to see his children.  Appears to understand that his body is in decline.  Appears agreeable to comfort care measures after my discussion with him.    Does not like BiPAP    Data reviewed today: I reviewed all medications, new labs and imaging results over the last 24 hours.    Physical Exam   Vital Signs: Temp: 98.3  F (36.8  C) Temp src: Axillary BP: (!) 70/34 (nurse alerted) Pulse: 70   Resp: (!) 36 (nurse alerted) SpO2: (!) 80 % O2 Device: Oxymask Oxygen Delivery: 6 LPM  Weight: 206 lbs 9.6 oz  General Appearance: .  Does not appear uncomfortable.  Respiratory: Rhonchi bilaterally.  Cardiovascular: Regular rate and rhythm without murmurs.  GI: Soft and nontender without hepatosplenomegaly.  Skin: Some bruising versus stasis dermatitis in his lower extremities with 1+ edema.  Other: Neurologically grossly intact.    Data   Recent Results (from the past 24 hour(s))   Glucose by meter    Collection Time: 11/08/21  9:10 PM   Result Value Ref Range    GLUCOSE BY METER POCT 96 70 - 99 mg/dL   Basic metabolic panel    Collection Time: 11/09/21  5:40 AM   Result Value Ref Range    Sodium 144 136 - 145 mmol/L    Potassium 4.7 3.5 - 5.0 mmol/L    Chloride 109 (H) 98 - 107 mmol/L    Carbon Dioxide (CO2) 27 22 - 31 mmol/L    Anion Gap 8 5 - 18 mmol/L    Urea Nitrogen 72 (H) 8 - 28 mg/dL    Creatinine 3.01 (H) 0.70 - 1.30 mg/dL    Calcium 9.2 8.5 - 10.5 mg/dL    Glucose 81 70 - 125 mg/dL    GFR Estimate 17 (L) >60 mL/min/1.73m2   CBC with platelets    Collection Time: 11/09/21  5:40 AM   Result Value Ref Range    WBC Count 37.4 (H) 4.0 - 11.0 10e3/uL    RBC Count 2.20 (L) 4.40 - 5.90 10e6/uL    Hemoglobin 7.3 (L) 13.3 - 17.7 g/dL    Hematocrit 25.0 (L) 40.0 - 53.0 %     (H) 78 - 100 fL    MCH 33.2 (H) 26.5 - 33.0 pg    MCHC 29.2 (L) 31.5 - 36.5 g/dL    RDW 17.2 (H) 10.0 - 15.0 %    Platelet Count 353 150 - 450 10e3/uL   Lactic Acid STAT    Collection Time: 11/09/21  5:40 AM    Result Value Ref Range    Lactic Acid 1.3 0.7 - 2.0 mmol/L

## 2021-11-09 NOTE — PROGRESS NOTES
Palliative Care    Discussed with RN.  Daughter Iliana has been asking her not to give Dilaudid.  After she left, patients son was ok with him Getting Dilaudid.  I have scheduled this TID now in an  Effort to wean him off his High flow face mask.      RNCM is checking to see requirements of [facility] regarding time frame after a Covid vaccine.  . Family is aware that this time frame could be two weeks.      GREY Salazar, NP-C, [facility]

## 2021-11-09 NOTE — PROGRESS NOTES
Respiratory Care Note    Went to place pt on BiPAP. He declined this and would like to stay on the oxymask. Liter flow down to 8 SpO2 mid 90s. RN notified.      Tono Mart, RT

## 2021-11-09 NOTE — PROGRESS NOTES
Pulm/CC brief update    Reviewed note from earlier today by Dr. Grier  Discussed with Dr. Nieves multiple times today    Patient is developing multi organ failure, prognosis is very poor overall. DNR/DNi code status noted - appreciate palliative care input.    Recommend hospice and/or comfort care in the hospital if he continues to decline.    No further recommendations. Unlikely to benefit from ICU level care (no beds available anyway)    Discussed with Dr. Nieves.    Kai (Oliverio) MD [place]  [facility] Pulmonary & Critical Care  Pager [phone]  Clinic [phone]  Fax [phone]

## 2021-11-09 NOTE — PROGRESS NOTES
Hematology    Patient is resting comfortably in bed.  He reports he feels tired .  His breathing is not labored.  We talked about his condition.  He is comfortable with his decision of hospice \ comfort care    Will try to touch base with his wife   Please call with any questions

## 2021-11-09 NOTE — PROGRESS NOTES
Progress Note     Primary Oncologist/Hematologist:     \  Chart reviewed. The patient is comfort cares now only.    On behalf of [facility], we thank Efe for giving us the opportunity to be a part of his care team. We will be thinking of him and his family.     Dr. Zepeda will plan to see Efe today.    We will sign off.    Rosa Kraus PA-C  [facility]  Work Cell [phone], available 8AM to 4PM

## 2021-11-09 NOTE — PLAN OF CARE
Refuses BiPAP overnight, RT notified and discussed with pt.   Sats drop with bed mobility or conversation, requiring 6-13L via oxymask.  Denies pain.  Incontinent of stool, intermittent urinal use.  Total cares.         Problem: Gas Exchange Impaired  Goal: Optimal Gas Exchange  Outcome: Declining  Intervention: Optimize Oxygenation and Ventilation  Recent Flowsheet Documentation  Taken 11/9/2021 0358 by Candy Sierra RN  Head of Bed (HOB) Positioning: HOB at 20-30 degrees  Taken 11/9/2021 0028 by Candy Sierra RN  Head of Bed (HOB) Positioning: HOB at 30 degrees     Problem: Respiratory Compromise COPD (Chronic Obstructive Pulmonary Disease)  Goal: Effective Oxygenation and Ventilation  Outcome: Declining  Intervention: Promote Airway Secretion Clearance  Recent Flowsheet Documentation  Taken 11/9/2021 0028 by Candy Sierra RN  Activity Management:   bedrest   activity adjusted per tolerance  Intervention: Optimize Oxygenation and Ventilation  Recent Flowsheet Documentation  Taken 11/9/2021 0358 by Candy Sierra RN  Head of Bed (HOB) Positioning: HOB at 20-30 degrees  Taken 11/9/2021 0028 by Candy Sierra RN  Head of Bed (HOB) Positioning: HOB at 30 degrees

## 2021-11-09 NOTE — PROGRESS NOTES
"[facility] Palliative Care Social Work Note    SW and Palliative Care NP Kenzie Louis met with patient's son Ameya, daughters Iliana and Melanie for goals of care meeting. NP provided medical update on patient and ongoing decline despite care, answered questions. Iliana Hou and Melanie expressed they have had \"glimmers of hope\" but have now come to the understanding that he will likely not be able to recover in the way they were hoping. They expressed some surprise about kidney issues. SW and NP provided space for questions and offered support during this decision-making process. SW acknowledged that family clearly cares deeply for their father and have done all they can as caregivers.     Information provided on comfort care and hospice. Family decided together that this would be the best option for their father. Hopes he can discharge to hospice facility as their mother received excellent [facility]. SW acknowledged their strengths as advocates for patient and normalized the choice of comfort care.     Family was tearful and shared anticipatory grief. NP and SW encouraged ongoing sharing of memories and spending meaningful time together with patient.     SW to continue to follow. Family agreed to check-in tomorrow, is aware of SW availability as needed.     Jessica Goldberg, [facility]  Palliative Care  Office # [phone]   "

## 2021-11-09 NOTE — PROGRESS NOTES
Care Management Follow Up    Length of Stay (days): 9    Expected Discharge Date:  11/10/2021     Concerns to be Addressed:  Discharge Planning:  Inpatient/Residential Hospice    Patient plan of care discussed at interdisciplinary rounds: Yes    Anticipated Discharge Disposition:  Inpatient/Residential Hospice      Anticipated Discharge Services:  Hospice  Anticipated Discharge DME:  EDILMA JONES      Patient/family educated on Medicare website which has current facility and service quality ratings:  Yes  Education Provided on the Discharge Plan:  Pending    Patient/Family in Agreement with the Plan:  Pending      Referrals Placed by CM/SW:  Multiple TCUs,  Lori at [facility], [facility]  Private pay costs discussed:  Inpatient Hospice    Additional Information:  Discharge Plan was TCU, however, Palliative Care Consultation today with the patient and family deciding to transition to Comfort Care.     Hospice Consultation ordered and pending.    Per Tayla Fields, the patient and family prefer the patient to discharge to a facility with Hospice care, preferably [facility].    This writer sent and called Referrals to  Lori at [facility] and [facility].    This writer spoke with  Lori at [facility] Admissions, [place], Phone [phone], who accepted the patient for 11/10/2021; Radhalacarlos states the facility requires payment at admission of a one-time Assessment Fee of $195.00 and five day pre-payment of $425.00 per day to total $2320.00.    This writer informed the patient's daughter, Iliana, and son, Ameya, of the above, with which they agree.    [facility] transportation scheduled for 11/10/2021 at 1100.    Care Management to follow.      Kayley Abrams CM

## 2021-11-10 NOTE — PLAN OF CARE
Patient was kept comfortable. Received scheduled dilaudid oral solution. Patient is able to make needs known. Son was here to visit, and assisted patient in ordering food. Patient ate 25% supper. Repositioned Q 2 hours and as needed. Covid swab collected and sent this shift.   Problem: Palliative Care  Goal: Enhanced Quality of Life  Outcome: Improving

## 2021-11-10 NOTE — PLAN OF CARE
Scheduled and PRN medication utilized to ease work of breathing, denies pain, using call light appropriately, able to make needs known.

## 2021-11-10 NOTE — DISCHARGE SUMMARY
[facility]  Hospitalist Discharge Summary      Date of Admission:  10/29/2021  Date of Discharge:  11/10/2021 11:45 AM  Discharging Provider: Sapphire Jimenez MD      Discharge Diagnoses       Acute on chronic respiratory failure, multifactorial.  Now on hospice.  Intolerant of BiPAP.  Lack of improvement with bilateral thoracenteses, steroids, antibiotics, and IV diuresis.    Renal failure; progressive decline despite interventions while here in the hospital.  Patient not felt to be candidate for dialysis and transition to comfort care.    MDS/worsening leukocytosis    Esophageal stenosis, status post dilation    Atrial fibrillation, ICD to be turned off    NSVT, ICD to be turned off    CHF with preserved ejection    Follow-ups Needed After Discharge   Follow-up Appointments     Follow Up and recommended labs and tests      Follow up with hospice physician upon discharge  Follow up for ICD turn off on pacemaker             Unresulted Labs Ordered in the Past 30 Days of this Admission     Date and Time Order Name Status Description    11/2/2021  4:18 PM Acid-Fast Bacilli Culture and Stain In process       These results will be followed up by NA    Discharge Disposition   Admited to hospice care.   Agency: [facility]; [facility].  Condition at discharge: Terminal      Hospital Course          89-year-old gentleman with past medical history of A. fib, COPD with chronic respiratory failure on home oxygen, obstructive sleep apnea on CPAP, MDS and CHF with preserved ejection fraction admitted to the hospital with weakness fatigue and malaise.    Acute on chronic respiratory failure  --- Suspected to be multifactorial secondary to CHF, COPD, pleural effusion.  --- Patient with normal procalcitonin, lactic acid, chest x-ray looking like CHF on admission.  --- Was on BiPAP early in hospital stay.  Now not felt to be useful to escalate care given patient's overall prognosis.  In addition to hospice and  comfort care 11/9  --- Status post bilateral thoracenteses, right 11/2 and left 11/3  --- Patient completed course of steroids both IV and p.o. for COPD exacerbation  --- Patient completed 7-day course of levofloxacin 11/6; restarted on IV Zosyn 11/7 due to worsening respiratory status concerning for aspiration and slightly elevated procalcitonin on recheck.  Now with focus on comfort recommended antibiotics to be discontinued but family advocated for oral antibiotics so transitioned Zosyn to Augmentin to complete course for discharge  --- Initially received IV diuresis and metolazone, now discontinued  --- Neurology and cardiology did follow, now signed off  --- Patient now on comfort care.      JERED  --- Continues to worsen spite interventions and nephrology follow  ---normal renal function in May  --- Admission CT negative for hydronephrosis  Previously on IV diuretics which were discontinued 11/6.  Was briefly on IV fluids until 11/7  --- Appreciate nephrology consultation.  Not a dialysis candidate  --- Nephrology agrees with comfort care approach.    MDS/worsening leukocytosis  --- [place] [facility] has been following.  Clear MDS versus myelofibrosis.  This was discussed with patient on 11/4 in regards to further work-up and patient declined bone marrow biopsy  --- Worsening leukocytosis, unclear significance  --- hydroxyurea was increased, now has gone back down  --- Appreciate [place] [facility] assistance    Dysphagia  --- Underwent EGD 11/5 showing esophageal stenosis now status post dilation.  --- Continue PPI  --- Did have flex sig 11/5 for evaluation of possible proctitis contributing to overall weakness on CT scan.  This was unremarkable.    A. Fib  --- Status post ICD placement.  This is to be shut off as patient on hospice  --- Not on anticoagulation due to recurrent falling    CHF with preserved ejection fraction  --- EF 50 to 55%  --- Known pulmonary hypertension, complicating  diuresis.    Malnutrition:    - Level of malnutrition: moderate   - Based on: reduced intake, mild (or greater) muscle loss, mild (or greater) fluid retention        Consultations This Hospital Stay   GASTROENTEROLOGY IP CONSULT  OCCUPATIONAL THERAPY ADULT IP CONSULT  PHYSICAL THERAPY ADULT IP CONSULT  SOCIAL WORK IP CONSULT  HEMATOLOGY & ONCOLOGY IP CONSULT  SPEECH LANGUAGE PATH ADULT IP CONSULT  CARDIOLOGY IP CONSULT  PULMONARY IP CONSULT  NEPHROLOGY IP CONSULT  SPEECH LANGUAGE PATH ADULT IP CONSULT  HEMATOLOGY & ONCOLOGY IP CONSULT  PALLIATIVE CARE ADULT IP CONSULT  HOSPICE IP CONSULT    Code Status   No CPR- Do NOT Intubate    Time Spent on this Encounter   I, Sapphire Jimenez MD, personally saw the patient today and spent greater than 30 minutes discharging this patient.  I had a 15-minute discussion with the family regarding hospice and patient's prognosis and additional 15 minutes spent at bedside with patient.  Then an additional 30 minutes spent on the discharge itself, with coordination of care working with hospice       Sapphire Jimenez MD  [address] [address]  Phone: [phone]  Fax: [phone]  ______________________________________________________________________    Physical Exam   Vital Signs: Temp: 98.3  F (36.8  C) Temp src: Axillary BP: 118/52 Pulse: 70   Resp: 24 SpO2: 95 % O2 Device: Oxymask Oxygen Delivery: 6 LPM  Weight: 206 lbs 9.6 oz  General Appearance: Pleasant, understands situation.  Mildly dyspneic  Respiratory: Quiet bilaterally.  No significant wheeze.  Cardiovascular: Regular rate and rhythm  GI: Soft and nontender without hepatosplenomegaly  Skin: Trace to 1+ lower extremity edema  Other: Neurologically grossly intact.  Appears fatigued       Primary Care Physician   Gabriel Weems    Discharge Orders      Adult Pulmonary Medicine Referral      General info for SNF    Length of Stay Estimate: Long Term Care  Condition at Discharge:  Terminal  Level of care:skilled   Admission H&P remains valid and up-to-date: Yes  Use Nursing Home Standing Orders: Yes  Please call [employer] to have ICD deactivated     Mantoux instructions    Give two-step Mantoux (PPD) Per Facility Policy Yes     Reason for your hospital stay    Shortness of breath. Renal failure     Activity - Up ad chilo     Follow Up and recommended labs and tests    Follow up with hospice physician upon discharge  Follow up for ICD turn off on pacemaker     No CPR- Do NOT Intubate     Diet    Follow this diet upon discharge: Orders Placed This Encounter      Snacks/Supplements Adult: Ensure Enlive; Between Meals      Regular Diet Adult       Significant Results and Procedures   Most Recent 3 CBC's:Recent Labs   Lab Test 11/09/21  0540 11/08/21  0548 11/07/21  0543   WBC 37.4* 36.5* 46.7*   HGB 7.3* 7.6* 7.9*   * 114* 115*    355 432     Most Recent 3 BMP's:Recent Labs   Lab Test 11/09/21  0540 11/08/21 2110 11/08/21  0548 11/07/21  0543     --  142 143   POTASSIUM 4.7  --  4.5 4.2   CHLORIDE 109*  --  107 107   CO2 27  --  29 27   BUN 72*  --  66* 61*   CR 3.01*  --  2.75* 2.34*   ANIONGAP 8  --  6 9   CIRA 9.2  --  9.3 9.2   GLC 81 96 95 114   ,   Results for orders placed or performed during the hospital encounter of 10/29/21   Chest XR,  PA & LAT    Narrative    EXAM: XR CHEST 2 VW  LOCATION: [facility]  DATE/TIME: 10/30/2021 12:01 AM    INDICATION: fatigue  COMPARISON: 08/12/2019      Impression    IMPRESSION: Heart size prominent on this AP view but unchanged. Pulmonary vascularity normal. Subsegmental atelectasis or scarring in the bases. Small bilateral pleural effusions posteriorly in the costophrenic angles. Upper lung fields are clear.   Pacemaker lead tips right atrium and right ventricle. Clips upper abdomen.   CT Abdomen Pelvis w Contrast    Narrative    EXAM: CT ABDOMEN PELVIS W CONTRAST  LOCATION: [facility]  HOSPITAL  DATE/TIME: 10/30/2021 12:11 AM    INDICATION: Leukocytosis and fatigue  COMPARISON: 05/18/2021  TECHNIQUE: CT scan of the abdomen and pelvis was performed following injection of IV contrast. Multiplanar reformats were obtained. Dose reduction techniques were used.  CONTRAST: isovue 370 75ml    FINDINGS:   LOWER CHEST: Bibasilar atelectasis or infiltrate and small pleural effusions. Cardiac leads.    HEPATOBILIARY: Cholecystectomy.    PANCREAS: Fatty atrophy.    SPLEEN: Splenomegaly, 18.4 cm.    ADRENAL GLANDS: Stable.    KIDNEYS/BLADDER: Small renal cysts.    BOWEL: Normal caliber. Diverticulosis. Postsurgical change sigmoid colon. Rectal wall thickening not excluded.    LYMPH NODES: Normal.    VASCULATURE: Atherosclerotic vascular calcification. 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 Procedural changes. Mild periaortic stranding similar. Ectatic iliac vasculature.    PELVIC ORGANS: Prostatectomy. Presacral edema.    MUSCULOSKELETAL: Fat-containing right inguinal hernia. Degenerative change osseous structures. Tiny helical hernia containing fat and knuckle of nondistended small bowel.      Impression    IMPRESSION:   1.  Bibasilar atelectasis or infiltrate and small pleural effusions.  2.  Splenomegaly.  3.  The wall thickening not excluded. Correlate for proctitis.  4.  Diverticulosis.  5.  Remainder similar to previous.   CT Head w/o Contrast    Narrative    EXAM: CT HEAD W/O CONTRAST  LOCATION: [facility]  DATE/TIME: 10/31/2021 1:35 PM    INDICATION: Weakness. Mild proliferative disorder.  COMPARISON: Head CT 08/12/2019  TECHNIQUE: Routine CT Head without IV contrast. Multiplanar reformats. Dose reduction techniques were used.    FINDINGS:  INTRACRANIAL CONTENTS: No intracranial hemorrhage, extraaxial collection, or mass effect.  No CT evidence of acute infarct. Moderate presumed chronic small vessel ischemic changes.  Moderate generalized volume loss. No hydrocephalus.     VISUALIZED ORBITS/SINUSES/MASTOIDS: No intraorbital abnormality. No paranasal sinus mucosal disease. No middle ear or mastoid effusion.    BONES/SOFT TISSUES: No acute abnormality.      Impression    IMPRESSION:  1.  No CT evidence for acute intracranial process.  2.  Brain atrophy and presumed chronic microvascular ischemic changes as above.  3.  No change.   XR Chest Port 1 View    Narrative    EXAM: XR CHEST PORT 1 VIEW  LOCATION: [facility]  DATE/TIME: 11/1/2021 11:10 AM    INDICATION: persistent hypoxia  COMPARISON: 10/29/2021      Impression    IMPRESSION: Stable position multilead left subclavian pacemaker. Stable heart size. There is cephalization of blood flow, and small pleural effusions, increased from previous. These findings suggest CHF. Also, there is new left mid to inferior chest   opacity which could represent asymmetric pulmonary edema, or pneumonia.   XR Chest Port 1 View    Narrative    EXAM: XR CHEST PORT 1 VIEW  LOCATION: [facility]  DATE/TIME: 11/2/2021 5:00 PM    INDICATION: s/p right thoracentesis. eval interval change, r/o PTX  COMPARISON: 11/01/2021      Impression    IMPRESSION: Pacemaker with leads over the RA, RV, and coronary sinus. Small left pleural effusion and tiny right pleural effusion. Minimal interstitial prominence could represent some subtle pulmonary edema. Lungs otherwise expanded and clear. Slightly   improved since yesterday. No pneumothorax.   XR Chest Port 1 View    Narrative    EXAM: XR CHEST PORT 1 VIEW  LOCATION: [facility]  DATE/TIME: 11/3/2021 12:28 PM    INDICATION: s/p left thoracentesis. eval for interval change, rule out pneumo  COMPARISON: 11/02/2021.      Impression    IMPRESSION: There has been interval decrease in left pleural effusion with improved aeration of the retrocardiac left lower lobe. No pneumothorax. Heart size and  pulmonary vascularity are unchanged. Mild pulmonary vascular congestion has improved.   Pacemaker defibrillator with leads over the right atrium, right ventricle and coronary sinus.   XR Chest Port 1 View    Narrative    EXAM: XR CHEST PORT 1 VIEW  LOCATION: [facility]  DATE/TIME: 2021 4:40 AM    INDICATION: Increase in oxygen needs  COMPARISON: 2021, 2021      Impression    IMPRESSION: New focal area of opacity involving the right upper lobe with areas of patchy opacity involving in both lower lungs. These findings can be seen with pneumonia in the proper clinical setting. Mild cardiac enlargement with pulmonary vascular   congestion indicating component of CHF/volume overload. Left-sided cardiac pacemaker. Aortic calcification.   Echocardiogram Complete     Value    LVEF  50-55%    Narrative    [id number]  [id number]  [id number]  [id number]^SHAYY^KAIDEN^[place] [address]     Name: GEE OGDEN  MRN: [id number]  : 1928  Study Date: 10/30/2021 10:42 AM  Age: 89 yrs  Gender: Male  Patient Location: [facility]  Reason For Study: Chest Discomfort  Ordering Physician: KAIDEN LUCAS  Performed By: JUANA     BSA: 2.2 m2  Height: 74 in  Weight: 203 lb  HR: 78  ______________________________________________________________________________  Procedure  Definity (NDC #21453-412) given intravenously.  ______________________________________________________________________________  Interpretation Summary     Definity contrast utilized  The left ventricle is mildly dilated.  There is mild to moderate concentric left ventricular hypertrophy.  LV systolic function appears lower limits of normal  The visual ejection fraction is 50-55%.  Septal wall motion abnormality may reflect pacemaker activation.  TAPSE is normal, which is consistent with normal right ventricular systolic  function.  Pacemaker lead in the right heart  There is mild (1+)  tricuspid regurgitation.  Estimate of RV systolic pressure is elevated at 54mmhg plus right atrial  pressure  Mild to moderate valvular aortic stenosis.Peak velocity 2.6 m/s,mean gradient  14mmhg.2D visualization of the aortic valve suggests the potential for more  significant aortic stenosis then calculated by doppler examination  There is mild (1+) aortic regurgitation.  The aortic root is moderately dilated.Measures approxmately 4.6cm  Mild to moderate enlargement of the proximal ascending aorta measures  approximately 4.4cm  Consider CT,GABE or MRI (if pacer compatible) to further define the aortic  valve and thoracic aorta  No prior study available for comparison  ______________________________________________________________________________  Left Ventricle  The left ventricle is mildly dilated. There is mild to moderate concentric  left ventricular hypertrophy. Diastolic Doppler findings (E/E' ratio and/or  other parameters) suggest left ventricular filling pressures are normal. The  visual ejection fraction is 50-55%. LV systolic function appears lower limits  of normal. Septal wall motion abnormality may reflect pacemaker activation.     Right Ventricle  The right ventricle is not well visualized. The right ventricle is mildly  dilated. TAPSE is normal, which is consistent with normal right ventricular  systolic function. The right ventricular systolic function is normal. There is  a pacemaker lead in the right ventricle.     Atria  The left atrium is mild to moderately dilated. Pacer wire in right atrium.  Right atrium not well visualized. The right atrium is mildly dilated.     Mitral Valve  The mitral valve leaflets appear thickened, but open well. There is moderate  mitral annular calcification. There is mild (1+) mitral regurgitation.     Tricuspid Valve  There is mild (1+) tricuspid regurgitation. Moderate (46-55mmHg) pulmonary  hypertension is present. The right ventricular systolic pressure  is  approximated at 53.5 mmHg plus the right atrial pressure.     Aortic Valve  Moderate to severe aortic valve leaflet calcification. There is mild (1+)  aortic regurgitation. Mild to moderate valvular aortic stenosis.     Pulmonic Valve  There is mild (1+) pulmonic valvular regurgitation.     Vessels  The aortic root is moderately dilated.Measures approxmately 4.6cm. Mild to  moderate enlargement of the proximal ascending aorta measures approximately  4.4cm. IVC diameter <2.1 cm collapsing >50% with sniff suggests a normal RA  pressure of 3 mmHg.     Pericardium  Trivial pericardial effusion.     ______________________________________________________________________________  MMode/2D Measurements & Calculations  IVSd: 1.4 cm  LVIDd: 6.9 cm  LVIDs: 4.0 cm  LVPWd: 1.4 cm     FS: 42.1 %  LV mass(C)d: 479.8 grams  LV mass(C)dI: 219.4 grams/m2  Ao root diam: 4.6 cm  LA dimension: 4.6 cm  asc Aorta Diam: 4.7 cm  LA/Ao: 1.0  LVOT diam: 3.0 cm  LVOT area: 7.2 cm2  RWT: 0.40     Time Measurements  MM HR: 74.0 BPM     Doppler Measurements & Calculations  MV E max indio: 67.3 cm/sec  MV A max indio: 90.3 cm/sec  MV E/A: 0.75  MV dec slope: 326.6 cm/sec2  MV dec time: 0.21 sec  Ao V2 max: 232.4 cm/sec  Ao max P.0 mmHg  Ao V2 mean: 177.2 cm/sec  Ao mean P.3 mmHg  Ao V2 VTI: 54.3 cm  DEEPIKA(I,D): 2.5 cm2  DEEPIKA(V,D): 3.2 cm2  AI P1/2t: 518.4 msec  LV V1 max P.2 mmHg  LV V1 max: 102.3 cm/sec  LV V1 VTI: 19.1 cm     SV(LVOT): 137.7 ml  SI(LVOT): 63.0 ml/m2  PA acc time: 0.07 sec  PI end-d indio: 132.7 cm/sec  TR max indio: 365.9 cm/sec  TR max P.5 mmHg  AV Indio Ratio (DI): 0.44  DEEPIKA Index (cm2/m2): 1.2  E/E' av.9  Lateral E/e': 6.7  Medial E/e': 11.0     ______________________________________________________________________________  Report approved by: Trev Jason 10/30/2021 12:53 PM               Discharge Medications   Discharge Medication List as of 11/10/2021 10:23 AM      START taking these medications     Details   amoxicillin-clavulanate (AUGMENTIN) 500-125 MG tablet Take 1 tablet by mouth 2 times daily for 5 days, Disp-10 tablet, R-0, No Print Out      bisacodyl (DULCOLAX) 10 MG suppository Place 1 suppository (10 mg) rectally daily as needed for constipation, R-0, No Print Out      HYDROmorphone, HIGH CONC, (DILAUDID) 10 mg/mL LIQD oral Place 0.05 mLs (0.5 mg) under the tongue every 8 hours Also can give q 2 hours prn dyspnea and pain, Disp-30 mL, R-0, No Print Out      LORazepam (ATIVAN) 2 MG/ML (HIGH CONC) PO solution Take 0.5 mLs (1 mg) by mouth every 6 hours as needed for anxiety (dyspnea related to anxiety), Disp-30 mL, R-0, No Print Out      polyethylene glycol (MIRALAX) 17 GM/Dose powder Take 17 g by mouth daily, Disp-510 g, R-0, No Print Out         CONTINUE these medications which have NOT CHANGED    Details   acetaminophen (TYLENOL) 500 MG tablet [ACETAMINOPHEN (TYLENOL) 500 MG TABLET] Take 500-1,000 mg by mouth every 6 (six) hours as needed for pain., Historical      albuterol (PROAIR HFA;PROVENTIL HFA;VENTOLIN HFA) 90 mcg/actuation inhaler [ALBUTEROL (PROAIR HFA;PROVENTIL HFA;VENTOLIN HFA) 90 MCG/ACTUATION INHALER] Inhale 2 puffs every 6 (six) hours as needed for wheezing., HistoricalMay substitute the equivalent medication per insurance preference.      celecoxib (CELEBREX) 200 MG capsule Take 200 mg by mouth 2 times daily as needed , Historical      mirtazapine (REMERON) 7.5 MG tablet Take 3.75 mg by mouth At Bedtime , Historical      senna-docusate (SENOKOT-S/PERICOLACE) 8.6-50 MG tablet Take 1 tablet by mouth daily, Historical         STOP taking these medications       hydroxyurea (HYDREA) 500 mg capsule Comments:   Reason for Stopping:         vitamin C (ASCORBIC ACID) 1000 MG TABS Comments:   Reason for Stopping:         Vitamin D3 (CHOLECALCIFEROL) 125 MCG (5000 UT) tablet Comments:   Reason for Stopping:             Allergies   Allergies   Allergen Reactions     Blood-Group Specific Substance  Other (See Comments)     Patient has an anti-C.  Blood product orders may be delayed.  Please draw one red top tube and two lavender top tubes for all Type and Screens/ Type and Crossmatch orders.      Thiopental Nausea and Vomiting

## 2021-11-10 NOTE — PROGRESS NOTES
"89yr admitted with CHF & pneumonia on 10/29/21.  Hx of A-fib, COPD, pleural effusions.  Patient now on comfort cares and will transfer to hospice care at time of discharge today.      Duoneb given x 1 today, BS decreased and non-productive cough.  Patient using 6L Oxymask, SaO2 95%.    /52 (BP Location: Right arm)   Pulse 70   Temp 98.3  F (36.8  C) (Axillary)   Resp 24   Ht 1.88 m (6' 2\")   Wt 93.7 kg (206 lb 9.6 oz)   SpO2 95%   BMI 26.53 kg/m      Patient will discharge this AM.  "

## 2021-11-10 NOTE — PROGRESS NOTES
"NUTRITION UPDATE  Pt changed to comfort cares yesterday    Diet: Regular  Supplement: Ensure Enlive BID    Meal intake remains poor/variable, %  3 BM yesterday  Pt requests writer to \"ask Iliana\" regarding if he would still like to receive Ensure BID  No unopened ensure noted in the room. Dtr Iliana also not around.     Will continue to send Ensure for now but can be discontinued if no longer desired.     Will sign off with change in GOC. RD can be re consulted prn  "

## 2021-11-10 NOTE — CONSULTS
HOSPICE - writer spoke with pt daughter Iliana. Family elected [facility] . Plan is for pt to discharge to [facility], today. [facility] team will meet pt there at approx 1130p  ICD to be turned off  DME ordered  Meds ordered for delivery  Thank you  Candace LANDIS  [facility]  [phone]

## 2021-11-18 ENCOUNTER — DOCUMENTATION ONLY (OUTPATIENT)
Dept: OTHER | Facility: CLINIC | Age: 86
End: 2021-11-18
Payer: COMMERCIAL

## 2021-12-30 LAB — ACID FAST STAIN (ARUP): NORMAL

## 2023-06-19 NOTE — TELEPHONE ENCOUNTER
[facility] Nurse T[facility] Telephone Note      Provider: ANTONI Oliver  Facility: [facility]    Facility Type: TCU    Caller: Deloris  Call Back Number:  [phone]    Allergies: Patient has no known allergies.    Reason for call: Pt has increased in wt starting today 228.4, 225.5, 227.1, 226.2, 225.2, 225.2, 222.8  No cough, no shortness of breath, no new edema, not on diuretics      Verbal Order/Direction given by Provider: place a copy of wts on NP's desk to review tomorrow    Provider giving order: ANTONI Oliver    Verbal order given to: Feliciano Trujillo RN      
No

## (undated) DEVICE — ENDO CATH BALLOON DILATATION ELMNTR 15MMX8X180CM 000344

## (undated) DEVICE — SOL WATER IRRIG 1000ML BOTTLE 2F7114

## (undated) DEVICE — TUBING SUCTION MEDI-VAC 1/4"X20' N620A - HE

## (undated) DEVICE — FORCEP BIOPSY 2.3MM DISP COATED 000388

## (undated) DEVICE — SUCTION MANIFOLD NEPTUNE 2 SYS 1 PORT 702-025-000